# Patient Record
Sex: MALE | Race: WHITE | URBAN - METROPOLITAN AREA
[De-identification: names, ages, dates, MRNs, and addresses within clinical notes are randomized per-mention and may not be internally consistent; named-entity substitution may affect disease eponyms.]

---

## 2017-01-06 RX ORDER — CHLORTHALIDONE 25 MG/1
25 TABLET ORAL DAILY
Qty: 90 TABLET | Refills: 3 | Status: SHIPPED | OUTPATIENT
Start: 2017-01-06 | End: 2018-04-13 | Stop reason: SDUPTHER

## 2017-02-01 ENCOUNTER — OFFICE VISIT (OUTPATIENT)
Dept: ORTHOPEDIC SURGERY | Age: 62
End: 2017-02-01

## 2017-02-01 VITALS
BODY MASS INDEX: 37.1 KG/M2 | HEART RATE: 82 BPM | SYSTOLIC BLOOD PRESSURE: 124 MMHG | HEIGHT: 71 IN | DIASTOLIC BLOOD PRESSURE: 69 MMHG | WEIGHT: 265 LBS | RESPIRATION RATE: 16 BRPM

## 2017-02-01 DIAGNOSIS — M76.72 PERONEAL TENDINITIS OF LEFT LOWER EXTREMITY: ICD-10-CM

## 2017-02-01 DIAGNOSIS — G57.82 SURAL NEURITIS, LEFT: Primary | ICD-10-CM

## 2017-02-01 PROCEDURE — 99243 OFF/OP CNSLTJ NEW/EST LOW 30: CPT | Performed by: ORTHOPAEDIC SURGERY

## 2017-02-01 PROCEDURE — 73630 X-RAY EXAM OF FOOT: CPT | Performed by: ORTHOPAEDIC SURGERY

## 2017-02-01 PROCEDURE — 73610 X-RAY EXAM OF ANKLE: CPT | Performed by: ORTHOPAEDIC SURGERY

## 2017-02-01 PROCEDURE — 64450 NJX AA&/STRD OTHER PN/BRANCH: CPT | Performed by: ORTHOPAEDIC SURGERY

## 2017-02-01 RX ORDER — CLONAZEPAM 0.5 MG/1
0.5 TABLET ORAL 2 TIMES DAILY PRN
COMMUNITY

## 2017-02-01 RX ORDER — PRAVASTATIN SODIUM 40 MG
40 TABLET ORAL DAILY
COMMUNITY

## 2017-02-01 RX ORDER — SUMATRIPTAN 100 MG/1
100 TABLET, FILM COATED ORAL
COMMUNITY

## 2017-02-01 RX ORDER — SPIRONOLACTONE 25 MG/1
25 TABLET ORAL DAILY
COMMUNITY

## 2017-02-01 RX ORDER — GABAPENTIN 600 MG/1
600 TABLET ORAL 3 TIMES DAILY
COMMUNITY

## 2017-02-01 RX ORDER — CELECOXIB 200 MG/1
200 CAPSULE ORAL 2 TIMES DAILY
COMMUNITY

## 2017-02-01 RX ORDER — LISINOPRIL 20 MG/1
20 TABLET ORAL DAILY
COMMUNITY

## 2017-02-01 RX ORDER — DESONIDE 0.5 MG/G
CREAM TOPICAL 2 TIMES DAILY
COMMUNITY

## 2017-02-01 RX ORDER — CYCLOBENZAPRINE HCL 10 MG
10 TABLET ORAL 3 TIMES DAILY PRN
COMMUNITY

## 2017-02-01 RX ORDER — NYSTATIN 100000 U/G
CREAM TOPICAL 2 TIMES DAILY
COMMUNITY

## 2017-02-01 RX ORDER — BUPROPION HYDROCHLORIDE 150 MG/1
150 TABLET, EXTENDED RELEASE ORAL 2 TIMES DAILY
COMMUNITY

## 2017-02-01 RX ORDER — TRAMADOL HYDROCHLORIDE 50 MG/1
50 TABLET ORAL EVERY 6 HOURS PRN
COMMUNITY

## 2017-02-01 RX ORDER — TIZANIDINE 2 MG/1
2 TABLET ORAL EVERY 6 HOURS PRN
COMMUNITY

## 2017-02-01 RX ORDER — BUTALBITAL, ACETAMINOPHEN AND CAFFEINE 50; 325; 40 MG/1; MG/1; MG/1
1 TABLET ORAL EVERY 4 HOURS PRN
COMMUNITY

## 2017-02-01 RX ORDER — CHLORTHALIDONE 50 MG/1
50 TABLET ORAL DAILY
COMMUNITY

## 2017-02-01 RX ORDER — OMEPRAZOLE 20 MG/1
20 CAPSULE, DELAYED RELEASE ORAL DAILY
COMMUNITY

## 2017-02-07 PROBLEM — M76.72 PERONEAL TENDINITIS OF LEFT LOWER EXTREMITY: Status: ACTIVE | Noted: 2017-02-07

## 2017-02-07 PROBLEM — G57.80 SURAL NEURITIS: Status: ACTIVE | Noted: 2017-02-07

## 2017-02-07 RX ORDER — LIDOCAINE HYDROCHLORIDE 10 MG/ML
3 INJECTION, SOLUTION INFILTRATION; PERINEURAL ONCE
Qty: 3 ML | Refills: 0
Start: 2017-02-07 | End: 2017-02-07

## 2017-02-20 ENCOUNTER — OFFICE VISIT (OUTPATIENT)
Dept: CARDIOLOGY | Facility: CLINIC | Age: 62
End: 2017-02-20

## 2017-02-20 VITALS
SYSTOLIC BLOOD PRESSURE: 124 MMHG | WEIGHT: 265 LBS | BODY MASS INDEX: 37.1 KG/M2 | HEIGHT: 71 IN | HEART RATE: 78 BPM | DIASTOLIC BLOOD PRESSURE: 76 MMHG

## 2017-02-20 DIAGNOSIS — R06.09 DYSPNEA ON EXERTION: Primary | ICD-10-CM

## 2017-02-20 DIAGNOSIS — E78.2 MIXED HYPERLIPIDEMIA: ICD-10-CM

## 2017-02-20 DIAGNOSIS — I10 ESSENTIAL HYPERTENSION: ICD-10-CM

## 2017-02-20 PROCEDURE — 99213 OFFICE O/P EST LOW 20 MIN: CPT | Performed by: INTERNAL MEDICINE

## 2017-02-20 RX ORDER — PRAVASTATIN SODIUM 40 MG
40 TABLET ORAL DAILY
COMMUNITY
End: 2017-11-27 | Stop reason: SDUPTHER

## 2017-02-20 RX ORDER — TRAMADOL HYDROCHLORIDE 50 MG/1
50 TABLET ORAL EVERY 6 HOURS PRN
COMMUNITY
End: 2017-11-27

## 2017-02-20 RX ORDER — CELECOXIB 200 MG/1
200 CAPSULE ORAL DAILY
COMMUNITY
End: 2017-11-27

## 2017-02-20 RX ORDER — CYCLOBENZAPRINE HCL 10 MG
10 TABLET ORAL 3 TIMES DAILY PRN
COMMUNITY
End: 2018-05-11

## 2017-02-20 NOTE — PROGRESS NOTES
[facility] at [facility]  Office Progress Note  Gonzalo Oates  1955  [phone]      Visit Date: 02/20/2017    PCP: Triston Weinstein MD  [address] [id number]    IDENTIFICATION: A 61 y.o. male retired  from [employer].      Chief Complaint   Patient presents with   • Follow-up     EVANS, VENOUS INSUFFICIENCY     PROBLEM LIST:  1. Dyspnea on exertion:   a. D-dimer, slightly elevated at 0.85, 03/26/2015:   b. CT scan shows no evidence of PE.   c. May 2015, echocardiogram showed normal EF, left atrial dilatation, mild MR and TR, RVSP 20% to 25%.  d. May 2015, MPS WNL, EF 52%.  2. Venous insufficiency:  a. Venous stenting per Dr. Toribio, [facility], 2015.  3. Hypertension.   4. Narcolepsy.   5. Obstructive sleep apnea:   a. Noncompliant with CPAP.   6. Restless leg:  a. Suppressed with narcotics.  7. Irritable bowel.   8. Prediabetes.   9. Erectile dysfunction.   10. Chronic sinusitis.  11. GERD.   12. Left ankle fraction, February 2015.  13. Surgical history:   a. Hernia repair.   b. Sinus surgery.   c. Laminectomy.   d. Tonsillectomy.   e. Appendectomy.          Allergies  Allergies   Allergen Reactions   • Calcium Channel Blockers      Lower extremity edema    • Hyoscyamine      DOES NOT HELP   • Latex    • Ropinirole      DID NOT HELP   • Zinc Oxide        Current Medications    Current Outpatient Prescriptions:   •  butalbital-acetaminophen-caffeine (FIORICET) -40 MG capsule capsule, Take 1 capsule by mouth as needed., Disp: , Rfl:   •  Calcium Carbonate-Vit D-Min (CALCIUM 1200 PO), Take  by mouth 2 (Two) Times a Day., Disp: , Rfl:   •  celecoxib (CeleBREX) 200 MG capsule, Take 200 mg by mouth Daily., Disp: , Rfl:   •  chlorthalidone (HYGROTON) 25 MG tablet, Take 1 tablet by mouth Daily., Disp: 90 tablet, Rfl: 3  •  Cholecalciferol (VITAMIN D3) 2000 UNITS capsule, Take 2 tablets by mouth daily., Disp: , Rfl:   •  clonazePAM (KlonoPIN) 0.5  MG tablet, Take 0.5 mg by mouth 2 (two) times a day as needed for seizures., Disp: , Rfl:   •  cyclobenzaprine (FLEXERIL) 10 MG tablet, Take 10 mg by mouth 3 (Three) Times a Day As Needed for muscle spasms., Disp: , Rfl:   •  desonide (DESOWEN) 0.05 % cream, Apply  topically. Apply sparingly to affected areas 2 to 3 times daily, Disp: , Rfl:   •  gabapentin (NEURONTIN) 800 MG tablet, Take 1 tablet by mouth 4 (four) times a day., Disp: , Rfl:   •  lisinopril (PRINIVIL,ZESTRIL) 20 MG tablet, Take 1 tablet by mouth daily., Disp: , Rfl:   •  nystatin-triamcinolone (MYCOLOG II) cream, Apply  topically 3 (three) times a day., Disp: , Rfl:   •  Omega-3 Fatty Acids (FISH OIL) 1000 MG capsule capsule, Take  by mouth 2 (Two) Times a Day With Meals., Disp: , Rfl:   •  omeprazole (PriLOSEC) 20 MG capsule, Take  by mouth Daily., Disp: , Rfl:   •  pravastatin (PRAVACHOL) 40 MG tablet, Take 40 mg by mouth Daily., Disp: , Rfl:   •  rotigotine (NEUPRO) 1 MG/24HR patch 24 hour 24 hour patch, Place 1 patch on the skin Daily., Disp: , Rfl:   •  spironolactone (ALDACTONE) 25 MG tablet, Take 1 tablet by mouth daily., Disp: , Rfl:   •  SUMAtriptan (IMITREX) 50 MG tablet, Take one tablet at onset of headache. May repeat dose one time in 2 hours if headache not relieved., Disp: , Rfl:   •  tiZANidine (ZANAFLEX) 2 MG tablet, Take 2 mg by mouth 3 (three) times a day., Disp: , Rfl:   •  traMADol (ULTRAM) 50 MG tablet, Take 50 mg by mouth Every 6 (Six) Hours As Needed for moderate pain (4-6)., Disp: , Rfl:       History of Present Illness   Patient presents in routine follow-up.  Has chronic dyspnea and still present albeit slightly improved.  It was worsened for a time after his lumbar fusion surgery but after working with physical therapy at this last few weeks he has had improvement.  Overall he feels pretty well without any chest pain, orthopnea, PND, palpitations, or significant lower extremity edema.  Blood pressures been well controlled  "and he's been taking pravastatin therapy daily without any severe side effects.    ROS:  All systems have been reviewed and are negative with the exception of those mentioned in the HPI.    OBJECTIVE:  Vitals:    02/20/17 1510 02/20/17 1522   BP: 118/80 124/76   BP Location: Right arm Left arm   Patient Position: Sitting Sitting   Pulse: 78    Weight: 265 lb (120 kg)    Height: 71\" (180.3 cm)      Physical Exam   Constitutional: He is oriented to person, place, and time. He appears well-developed and well-nourished. No distress.   Neck: Normal range of motion. Neck supple. No hepatojugular reflux and no JVD present. Carotid bruit is not present. No tracheal deviation present. No thyromegaly present.   Cardiovascular: Normal rate, regular rhythm, S1 normal, S2 normal, intact distal pulses and normal pulses.  PMI is not displaced.  Exam reveals no gallop, no distant heart sounds, no friction rub, no midsystolic click and no opening snap.    No murmur heard.  Pulses:       Radial pulses are 2+ on the right side, and 2+ on the left side.        Dorsalis pedis pulses are 2+ on the right side, and 2+ on the left side.        Posterior tibial pulses are 2+ on the right side, and 2+ on the left side.   Pulmonary/Chest: Effort normal and breath sounds normal. He has no wheezes. He has no rales.   Abdominal: Soft. Bowel sounds are normal. He exhibits no mass. There is no tenderness. There is no guarding.   Musculoskeletal: He exhibits no edema or tenderness.   Neurological: He is alert and oriented to person, place, and time.   Nursing note and vitals reviewed.      Diagnostic Data:  Procedures      ASSESSMENT:   Diagnosis Plan   1. Dyspnea on exertion     2. Mixed hyperlipidemia     3. Essential hypertension         PLAN:  1.  Chronic and nonprogressive with low risk ischemic evaluation in May 2015.  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  2.  Continue " statin therapy.  Recommend PCP continued routine yearly lipid check.  Goal LDL of less than 100.  3.  Well controlled on current therapy no changes to current regimen.    Follow-up with us in 9-12 months or sooner if needed.    Scribed for Ross Ellsworth MD by BARBARA Mujica. 2/20/2017  3:48 PM   I, Ross Ellsworth MD, personally performed the services described in this documentation as scribed by the above named individual in my presence, and it is both accurate and complete.  2/22/2017  9:06 AM    Triston Weinstein MD, thank you for referring Mr. Oates for evaluation.  I have forwarded my electronically generated recommendations to you for review.  Please do not hesitate to call with any questions.      Ross Ellsworth MD, FACC

## 2017-02-21 ENCOUNTER — TELEPHONE (OUTPATIENT)
Dept: CARDIOLOGY | Facility: CLINIC | Age: 62
End: 2017-02-21

## 2017-02-21 NOTE — TELEPHONE ENCOUNTER
Patient called and stated he had lab work to fax. Informed patient of fax number. He will fax labs today

## 2017-02-22 ENCOUNTER — HOSPITAL ENCOUNTER (OUTPATIENT)
Dept: MRI IMAGING | Age: 62
Discharge: HOME | End: 2017-02-22
Attending: ORTHOPAEDIC SURGERY | Admitting: ORTHOPAEDIC SURGERY

## 2017-02-22 ENCOUNTER — OFFICE VISIT (OUTPATIENT)
Dept: ORTHOPEDIC SURGERY | Age: 62
End: 2017-02-22

## 2017-02-22 VITALS
BODY MASS INDEX: 37.1 KG/M2 | HEART RATE: 67 BPM | DIASTOLIC BLOOD PRESSURE: 57 MMHG | HEIGHT: 71 IN | SYSTOLIC BLOOD PRESSURE: 121 MMHG | WEIGHT: 265 LBS | RESPIRATION RATE: 16 BRPM

## 2017-02-22 DIAGNOSIS — M76.72 PERONEAL TENDINITIS OF LEFT LOWER EXTREMITY: ICD-10-CM

## 2017-02-22 DIAGNOSIS — G57.82 SURAL NEURITIS, LEFT: Primary | ICD-10-CM

## 2017-02-22 PROCEDURE — 99214 OFFICE O/P EST MOD 30 MIN: CPT | Performed by: ORTHOPAEDIC SURGERY

## 2017-02-22 RX ORDER — CEPHALEXIN 500 MG/1
500 CAPSULE ORAL 4 TIMES DAILY
Qty: 20 CAPSULE | Refills: 0 | Status: SHIPPED | OUTPATIENT
Start: 2017-02-22

## 2017-11-27 ENCOUNTER — OFFICE VISIT (OUTPATIENT)
Dept: CARDIOLOGY | Facility: CLINIC | Age: 62
End: 2017-11-27

## 2017-11-27 VITALS
HEIGHT: 71 IN | DIASTOLIC BLOOD PRESSURE: 58 MMHG | HEART RATE: 64 BPM | BODY MASS INDEX: 39.23 KG/M2 | WEIGHT: 280.2 LBS | SYSTOLIC BLOOD PRESSURE: 124 MMHG

## 2017-11-27 DIAGNOSIS — I10 ESSENTIAL HYPERTENSION: Primary | ICD-10-CM

## 2017-11-27 DIAGNOSIS — E78.2 MIXED HYPERLIPIDEMIA: ICD-10-CM

## 2017-11-27 PROCEDURE — 99213 OFFICE O/P EST LOW 20 MIN: CPT | Performed by: INTERNAL MEDICINE

## 2017-11-27 RX ORDER — HYDROCODONE BITARTRATE AND ACETAMINOPHEN 5; 325 MG/1; MG/1
1 TABLET ORAL EVERY 6 HOURS PRN
COMMUNITY
End: 2018-05-11

## 2017-11-27 RX ORDER — ASPIRIN 81 MG/1
81 TABLET ORAL DAILY
COMMUNITY
End: 2018-05-11

## 2017-11-27 RX ORDER — PRAVASTATIN SODIUM 40 MG
40 TABLET ORAL DAILY
Qty: 90 TABLET | Refills: 3 | Status: SHIPPED | OUTPATIENT
Start: 2017-11-27 | End: 2018-04-13 | Stop reason: SDUPTHER

## 2017-11-27 RX ORDER — DULOXETIN HYDROCHLORIDE 60 MG/1
60 CAPSULE, DELAYED RELEASE ORAL DAILY
COMMUNITY
End: 2018-05-11

## 2017-11-27 RX ORDER — NAPROXEN SODIUM 220 MG
220 TABLET ORAL 2 TIMES DAILY PRN
COMMUNITY
End: 2018-05-11

## 2017-11-27 RX ORDER — DUTASTERIDE 0.5 MG/1
0.5 CAPSULE, LIQUID FILLED ORAL DAILY
COMMUNITY
End: 2018-05-11

## 2017-11-27 NOTE — PROGRESS NOTES
[facility] at [facility]  Office Progress Note  Gonzalo Oates  1955  [phone]      Visit Date: 02/20/2017    PCP: Triston Weinstein MD  [address] [id number]    IDENTIFICATION: A 62 y.o. male retired  from [employer].      Chief Complaint   Patient presents with   • Follow-up   • Shortness of Breath   • Hypertension   • Hyperlipidemia   • Surgical Clearance     PROBLEM LIST:  1. Dyspnea on exertion:   a. D-dimer, slightly elevated at 0.85, 03/26/2015:   b. CT scan shows no evidence of PE.   c. May 2015, echocardiogram showed normal EF, left atrial dilatation, mild MR and TR, RVSP 20% to 25%.  d. May 2015, MPS WNL, EF 52%.  2. Venous insufficiency:  a. Venous stenting per Dr. Toribio, [facility], 2015.  3. Hypertension.   4. Narcolepsy.   5. Obstructive sleep apnea:   a. Noncompliant with CPAP.   6. Restless leg:  a. Suppressed with narcotics.  7. Irritable bowel.   8. Prediabetes.   9. Erectile dysfunction.   10. Chronic sinusitis.  11. GERD.   12. Left ankle fraction, February 2015.  13. Surgical history:   a. Hernia repair.   b. Sinus surgery.   c. Laminectomy.   d. Tonsillectomy.   e. Appendectomy.          Allergies  Allergies   Allergen Reactions   • Calcium Channel Blockers      Lower extremity edema    • Hyoscyamine      DOES NOT HELP   • Latex    • Ropinirole      DID NOT HELP   • Zinc Oxide        Current Medications    Current Outpatient Prescriptions:   •  aspirin 81 MG EC tablet, Take 81 mg by mouth Daily., Disp: , Rfl:   •  butalbital-acetaminophen-caffeine (FIORICET) -40 MG capsule capsule, Take 1 capsule by mouth as needed., Disp: , Rfl:   •  chlorthalidone (HYGROTON) 25 MG tablet, Take 1 tablet by mouth Daily., Disp: 90 tablet, Rfl: 3  •  Cholecalciferol (VITAMIN D3) 2000 UNITS capsule, Take 2 tablets by mouth daily., Disp: , Rfl:   •  clonazePAM (KlonoPIN) 0.5 MG tablet, Take 0.5 mg by mouth 2 (two) times a day as needed  "for seizures., Disp: , Rfl:   •  cyclobenzaprine (FLEXERIL) 10 MG tablet, Take 10 mg by mouth 3 (Three) Times a Day As Needed for muscle spasms., Disp: , Rfl:   •  DULoxetine (CYMBALTA) 60 MG capsule, Take 60 mg by mouth Daily., Disp: , Rfl:   •  dutasteride (AVODART) 0.5 MG capsule, Take 0.5 mg by mouth Daily., Disp: , Rfl:   •  esomeprazole (nexIUM) 20 MG capsule, Take 20 mg by mouth Every Morning Before Breakfast., Disp: , Rfl:   •  gabapentin (NEURONTIN) 800 MG tablet, Take 1 tablet by mouth 4 (four) times a day., Disp: , Rfl:   •  HYDROcodone-acetaminophen (NORCO) 5-325 MG per tablet, Take 1 tablet by mouth Every 6 (Six) Hours As Needed., Disp: , Rfl:   •  lisinopril (PRINIVIL,ZESTRIL) 20 MG tablet, Take 1 tablet by mouth daily., Disp: , Rfl:   •  naproxen sodium (ALEVE) 220 MG tablet, Take 220 mg by mouth 2 (Two) Times a Day As Needed., Disp: , Rfl:   •  nystatin-triamcinolone (MYCOLOG II) cream, Apply  topically 3 (three) times a day., Disp: , Rfl:   •  Omega-3 Fatty Acids (FISH OIL) 1000 MG capsule capsule, Take  by mouth 2 (Two) Times a Day With Meals., Disp: , Rfl:   •  pravastatin (PRAVACHOL) 40 MG tablet, Take 1 tablet by mouth Daily., Disp: 90 tablet, Rfl: 3  •  spironolactone (ALDACTONE) 25 MG tablet, Take 1 tablet by mouth daily., Disp: , Rfl:   •  SUMAtriptan (IMITREX) 50 MG tablet, Take one tablet at onset of headache. May repeat dose one time in 2 hours if headache not relieved., Disp: , Rfl:       History of Present Illness   Patient presents in routine follow-up.   ROS:  All systems have been reviewed and are negative with the exception of those mentioned in the HPI.    OBJECTIVE:  Vitals:    11/27/17 1205   BP: 124/58   BP Location: Right arm   Patient Position: Sitting   Pulse: 64   Weight: 280 lb 3.2 oz (127 kg)   Height: 71\" (180.3 cm)     Physical Exam   Constitutional: He is oriented to person, place, and time. He appears well-developed and well-nourished. No distress.   Neck: Normal range " of motion. Neck supple. No hepatojugular reflux and no JVD present. Carotid bruit is not present. No tracheal deviation present. No thyromegaly present.   Cardiovascular: Normal rate, regular rhythm, S1 normal, S2 normal, intact distal pulses and normal pulses.  PMI is not displaced.  Exam reveals no gallop, no distant heart sounds, no friction rub, no midsystolic click and no opening snap.    No murmur heard.  Pulses:       Radial pulses are 2+ on the right side, and 2+ on the left side.        Dorsalis pedis pulses are 2+ on the right side, and 2+ on the left side.        Posterior tibial pulses are 2+ on the right side, and 2+ on the left side.   Pulmonary/Chest: Effort normal and breath sounds normal. He has no wheezes. He has no rales.   Abdominal: Soft. Bowel sounds are normal. He exhibits no mass. There is no tenderness. There is no guarding.   Musculoskeletal: He exhibits no edema or tenderness.   Neurological: He is alert and oriented to person, place, and time.   Nursing note and vitals reviewed.      Diagnostic Data:  Procedures      ASSESSMENT:   Diagnosis Plan   1. Essential hypertension     2. Mixed hyperlipidemia         PLAN:  1.  Chronic and nonprogressive with low risk ischemic evaluation in May 2015.  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  2.  Continue statin therapy.  Recommend PCP continued routine yearly lipid check.  Goal LDL of less than 100.  3.  Well controlled on current therapy no changes to current regimen.    Follow-up with us in 9-12 months or sooner if needed.    Scribed for Ross Ellsworth MD by Ross Ellsworth MD. 11/27/2017  12:55 PM   I, Ross Ellsworth MD, personally performed the services described in this documentation as scribed by the above named individual in my presence, and it is both accurate and complete.  11/27/2017  12:55 PM    Triston Weinstein MD, thank you for referring Mr. Oates for  evaluation.  I have forwarded my electronically generated recommendations to you for review.  Please do not hesitate to call with any questions.      Ross Ellsworth MD, FACC

## 2017-11-27 NOTE — PROGRESS NOTES
[facility] at [facility]  Office Progress Note  Gonzalo Oates  1955  [phone]      Visit Date: 11/27/2017    PCP: Triston Weinstein MD  [address] [id number]    IDENTIFICATION: A 62 y.o. male retired  from [employer].     Chief Complaint   Patient presents with   • Follow-up   • Shortness of Breath   • Hypertension   • Hyperlipidemia   • Surgical Clearance       PROBLEM LIST:   1. Dyspnea on exertion:   a. D-dimer, slightly elevated at 0.85, 03/26/2015:   b. CT scan shows no evidence of PE.   c. May 2015, echocardiogram showed normal EF, left atrial dilatation, mild MR and TR, RVSP 20% to 25%.  d. May 2015, MPS WNL, EF 52%.  2. Venous insufficiency:  a. Venous stenting per Dr. Toribio, [facility], 2015.  3. Hypertension.   4. Narcolepsy.   5. Obstructive sleep apnea:   a. Noncompliant with CPAP.   6. Restless leg:  a. Suppressed with narcotics.  7. Irritable bowel.   8. Prediabetes.   9. Erectile dysfunction.   10. Chronic sinusitis.  11. GERD.   12. Left ankle fraction, February 2015.  13. Surgical history:   a. Hernia repair.   b. Sinus surgery.   c. Laminectomy.   d. Tonsillectomy.   e. Appendectomy.     Allergies  Allergies   Allergen Reactions   • Calcium Channel Blockers      Lower extremity edema    • Hyoscyamine      DOES NOT HELP   • Latex    • Ropinirole      DID NOT HELP   • Zinc Oxide        Current Medications    Current Outpatient Prescriptions:   •  aspirin 81 MG EC tablet, Take 81 mg by mouth Daily., Disp: , Rfl:   •  butalbital-acetaminophen-caffeine (FIORICET) -40 MG capsule capsule, Take 1 capsule by mouth as needed., Disp: , Rfl:   •  chlorthalidone (HYGROTON) 25 MG tablet, Take 1 tablet by mouth Daily., Disp: 90 tablet, Rfl: 3  •  Cholecalciferol (VITAMIN D3) 2000 UNITS capsule, Take 2 tablets by mouth daily., Disp: , Rfl:   •  clonazePAM (KlonoPIN) 0.5 MG tablet, Take 0.5 mg by mouth 2 (two) times a day as needed for  seizures., Disp: , Rfl:   •  cyclobenzaprine (FLEXERIL) 10 MG tablet, Take 10 mg by mouth 3 (Three) Times a Day As Needed for muscle spasms., Disp: , Rfl:   •  DULoxetine (CYMBALTA) 60 MG capsule, Take 60 mg by mouth Daily., Disp: , Rfl:   •  dutasteride (AVODART) 0.5 MG capsule, Take 0.5 mg by mouth Daily., Disp: , Rfl:   •  esomeprazole (nexIUM) 20 MG capsule, Take 20 mg by mouth Every Morning Before Breakfast., Disp: , Rfl:   •  gabapentin (NEURONTIN) 800 MG tablet, Take 1 tablet by mouth 4 (four) times a day., Disp: , Rfl:   •  HYDROcodone-acetaminophen (NORCO) 5-325 MG per tablet, Take 1 tablet by mouth Every 6 (Six) Hours As Needed., Disp: , Rfl:   •  lisinopril (PRINIVIL,ZESTRIL) 20 MG tablet, Take 1 tablet by mouth daily., Disp: , Rfl:   •  naproxen sodium (ALEVE) 220 MG tablet, Take 220 mg by mouth 2 (Two) Times a Day As Needed., Disp: , Rfl:   •  nystatin-triamcinolone (MYCOLOG II) cream, Apply  topically 3 (three) times a day., Disp: , Rfl:   •  Omega-3 Fatty Acids (FISH OIL) 1000 MG capsule capsule, Take  by mouth 2 (Two) Times a Day With Meals., Disp: , Rfl:   •  pravastatin (PRAVACHOL) 40 MG tablet, Take 1 tablet by mouth Daily., Disp: 90 tablet, Rfl: 3  •  spironolactone (ALDACTONE) 25 MG tablet, Take 1 tablet by mouth daily., Disp: , Rfl:   •  SUMAtriptan (IMITREX) 50 MG tablet, Take one tablet at onset of headache. May repeat dose one time in 2 hours if headache not relieved., Disp: , Rfl:       History of Present Illness     Pt denies any new chest pain, dyspnea, dyspnea on exertion, orthopnea, PND, palpitations, lower extremity edema, or claudication.  Very noncompliant from dietary standpoint in that he is largely confined to a seated position majority of the day.  He subsequently lost his employment and is considering moving to [place] closer to his daughter    ROS:  All systems have been reviewed and are negative with the exception of those mentioned in the HPI.    OBJECTIVE:  Vitals:     "11/27/17 1205   BP: 124/58   BP Location: Right arm   Patient Position: Sitting   Pulse: 64   Weight: 280 lb 3.2 oz (127 kg)   Height: 71\" (180.3 cm)     Physical Exam   Constitutional: He appears well-developed and well-nourished.   Neck: Normal range of motion. Neck supple. No hepatojugular reflux and no JVD present. Carotid bruit is not present. No tracheal deviation present. No thyromegaly present.   Cardiovascular: Normal rate, regular rhythm, S1 normal, S2 normal, intact distal pulses and normal pulses.  PMI is not displaced.  Exam reveals no gallop, no distant heart sounds, no friction rub, no midsystolic click and no opening snap.    Murmur heard.  Pulses:       Radial pulses are 2+ on the right side, and 2+ on the left side.        Dorsalis pedis pulses are 2+ on the right side, and 2+ on the left side.        Posterior tibial pulses are 2+ on the right side, and 2+ on the left side.   Pulmonary/Chest: Effort normal and breath sounds normal. He has no wheezes. He has no rales.   Abdominal: Soft. Bowel sounds are normal. He exhibits distension. He exhibits no mass. There is no tenderness. There is no guarding.   Musculoskeletal: He exhibits edema.       Diagnostic Data:  Procedures      ASSESSMENT:   Diagnosis Plan   1. Essential hypertension     2. Mixed hyperlipidemia         PLAN:  1. Hypertension controlled on current  2. Lower extremity swelling historical lower extremity DVT with chronic venous insufficiency compression stockings in place      Triston Weinstein MD, thank you for referring Mr. Oates for evaluation.  I have forwarded my electronically generated recommendations to you for review.  Please do not hesitate to call with any questions.    Scribed for Ross Ellsworth MD by Anna Murray PA-C. 11/27/2017  12:14 PM  I, Ross Ellsworth MD, personally performed the services described in this documentation as scribed by the above named individual in my presence, and it is both accurate and " complete.  11/27/2017  12:55 PM    Ross Ellsworth MD, FACC

## 2018-04-06 RX ORDER — LISINOPRIL 20 MG/1
20 TABLET ORAL DAILY
Qty: 90 TABLET | Refills: 3 | Status: SHIPPED | OUTPATIENT
Start: 2018-04-06 | End: 2018-04-13 | Stop reason: SDUPTHER

## 2018-04-06 RX ORDER — CHLORTHALIDONE 25 MG/1
25 TABLET ORAL DAILY
Qty: 90 TABLET | Refills: 3 | Status: SHIPPED | OUTPATIENT
Start: 2018-04-06 | End: 2018-04-13 | Stop reason: SDUPTHER

## 2018-04-06 RX ORDER — PRAVASTATIN SODIUM 40 MG
40 TABLET ORAL DAILY
Qty: 90 TABLET | Refills: 3 | Status: SHIPPED | OUTPATIENT
Start: 2018-04-06 | End: 2018-04-13 | Stop reason: SDUPTHER

## 2018-04-13 RX ORDER — PRAVASTATIN SODIUM 40 MG
40 TABLET ORAL DAILY
Qty: 90 TABLET | Refills: 3 | Status: SHIPPED | OUTPATIENT
Start: 2018-04-13 | End: 2018-04-18 | Stop reason: SDUPTHER

## 2018-04-13 RX ORDER — CHLORTHALIDONE 25 MG/1
25 TABLET ORAL DAILY
Qty: 90 TABLET | Refills: 3 | Status: SHIPPED | OUTPATIENT
Start: 2018-04-13 | End: 2018-04-18 | Stop reason: SDUPTHER

## 2018-04-13 RX ORDER — LISINOPRIL 20 MG/1
20 TABLET ORAL DAILY
Qty: 90 TABLET | Refills: 3 | Status: SHIPPED | OUTPATIENT
Start: 2018-04-13 | End: 2018-04-18 | Stop reason: SDUPTHER

## 2018-04-18 RX ORDER — PRAVASTATIN SODIUM 40 MG
TABLET ORAL
Qty: 90 TABLET | Refills: 3 | Status: SHIPPED | OUTPATIENT
Start: 2018-04-18 | End: 2018-06-08 | Stop reason: SDUPTHER

## 2018-04-18 RX ORDER — LISINOPRIL 20 MG/1
20 TABLET ORAL DAILY
Qty: 90 TABLET | Refills: 3 | Status: SHIPPED | OUTPATIENT
Start: 2018-04-18 | End: 2018-05-11

## 2018-04-18 RX ORDER — CHLORTHALIDONE 25 MG/1
25 TABLET ORAL DAILY
Qty: 90 TABLET | Refills: 3 | Status: SHIPPED | OUTPATIENT
Start: 2018-04-18 | End: 2018-06-08 | Stop reason: SDUPTHER

## 2018-05-10 ENCOUNTER — OFFICE VISIT (OUTPATIENT)
Dept: INTERNAL MEDICINE | Facility: CLINIC | Age: 63
End: 2018-05-10

## 2018-05-10 VITALS
TEMPERATURE: 97.9 F | HEIGHT: 71 IN | RESPIRATION RATE: 16 BRPM | WEIGHT: 276.13 LBS | OXYGEN SATURATION: 96 % | DIASTOLIC BLOOD PRESSURE: 70 MMHG | SYSTOLIC BLOOD PRESSURE: 118 MMHG | HEART RATE: 86 BPM | BODY MASS INDEX: 38.66 KG/M2

## 2018-05-10 DIAGNOSIS — R06.09 DYSPNEA ON EXERTION: ICD-10-CM

## 2018-05-10 DIAGNOSIS — I87.2 VENOUS INSUFFICIENCY: ICD-10-CM

## 2018-05-10 DIAGNOSIS — I10 ESSENTIAL HYPERTENSION: ICD-10-CM

## 2018-05-10 DIAGNOSIS — G25.81 RESTLESS LEG: Primary | ICD-10-CM

## 2018-05-10 PROCEDURE — 99214 OFFICE O/P EST MOD 30 MIN: CPT | Performed by: NURSE PRACTITIONER

## 2018-05-11 RX ORDER — DOXEPIN HYDROCHLORIDE 10 MG/1
CAPSULE ORAL
Refills: 4 | COMMUNITY
Start: 2018-03-12 | End: 2018-05-23

## 2018-05-11 RX ORDER — PRAVASTATIN SODIUM 40 MG
40 TABLET ORAL DAILY
COMMUNITY
End: 2019-04-18 | Stop reason: SDUPTHER

## 2018-05-11 RX ORDER — VALSARTAN AND HYDROCHLOROTHIAZIDE 80; 12.5 MG/1; MG/1
TABLET, FILM COATED ORAL DAILY
COMMUNITY
Start: 2018-05-05 | End: 2018-05-23

## 2018-05-11 RX ORDER — CHLORTHALIDONE 25 MG/1
25 TABLET ORAL
COMMUNITY
End: 2022-09-30

## 2018-05-11 RX ORDER — OXYCODONE AND ACETAMINOPHEN 10; 325 MG/1; MG/1
1 TABLET ORAL
COMMUNITY
Start: 2018-04-20 | End: 2018-06-08

## 2018-05-11 RX ORDER — OMEPRAZOLE 40 MG/1
40 CAPSULE, DELAYED RELEASE ORAL DAILY
COMMUNITY
End: 2018-07-16 | Stop reason: SDUPTHER

## 2018-05-11 RX ORDER — OCTISALATE, AVOBENZONE, HOMOSALATE, AND OCTOCRYLENE 29.4; 29.4; 49; 25.48 MG/ML; MG/ML; MG/ML; MG/ML
LOTION TOPICAL DAILY
COMMUNITY
End: 2018-06-08

## 2018-05-11 RX ORDER — BLOOD-GLUCOSE METER
KIT MISCELLANEOUS
Qty: 1 EACH | Refills: 0 | Status: SHIPPED | OUTPATIENT
Start: 2018-05-11 | End: 2019-08-27

## 2018-05-11 RX ORDER — CELECOXIB 200 MG/1
CAPSULE ORAL
Refills: 4 | COMMUNITY
Start: 2018-03-12 | End: 2018-05-23

## 2018-05-11 RX ORDER — CYCLOBENZAPRINE HCL 10 MG
10 TABLET ORAL
COMMUNITY
Start: 2018-04-20 | End: 2018-06-08 | Stop reason: SDUPTHER

## 2018-05-11 RX ORDER — LANCETS 30 GAUGE
EACH MISCELLANEOUS
Qty: 100 EACH | Refills: 1 | Status: SHIPPED | OUTPATIENT
Start: 2018-05-11 | End: 2018-10-24

## 2018-05-21 PROBLEM — I10 HYPERTENSION: Status: ACTIVE | Noted: 2018-05-21

## 2018-05-21 NOTE — PROGRESS NOTES
Chief Complaint / Reason:      Chief Complaint   Patient presents with   • Establish Care     bilateral leg edema, thirst and dizziness.        Subjective     HPI  Patient presents today to establish care and discuss bilateral leg edema, thirst and dizziness.  He denies chest pain, shortness of breath or heart palpitations vital signs are stable.  He is accompanied by his wife and rates that this is been going on for over a week now and he is getting concerned.  Past medical history includes hypertension varicose veins he has had a DVT venous insufficiency, obesity ankle fracture and obstructive sleep apnea.  He does have dyspnea on exertion.  Patient does see cardiology and states that he was on lisinopril and that several of his medications were changed and he would like to go back on them as he feels like his blood pressure medicine caused increased swelling.  He does have bilateral edema in both legs he was on multiple medications for swelling and hypertension which include chlorothiazide spironolactone chlorthalidone and the lisinopril was changed to valsartan.  Patient is also 2 weeks postop lumbar fusion as he has chronic back pain.  The site from the lumbar fusion appears to be healing well and he denies fever or chills.  Patient does drink alcohol and states that he has maybe 3 shots of liquor per week.  History taken from: patient    PMH/FH/Social History were reviewed and updated appropriately in the electronic medical record.     Review of Systems:   Review of Systems   Constitutional: Positive for fatigue. Negative for appetite change, chills, fever and unexpected weight change.   HENT: Negative for congestion, ear pain, hearing loss, nosebleeds, postnasal drip, rhinorrhea, sore throat, tinnitus and trouble swallowing.    Eyes: Negative for photophobia, discharge and visual disturbance.   Respiratory: Positive for shortness of breath. Negative for cough, chest tightness and wheezing.     Cardiovascular: Positive for leg swelling. Negative for chest pain and palpitations.   Gastrointestinal: Negative for abdominal distention, abdominal pain, blood in stool, constipation, diarrhea, nausea and vomiting.   Endocrine: Negative for cold intolerance, heat intolerance, polydipsia, polyphagia and polyuria.   Genitourinary: Negative for difficulty urinating, dysuria, flank pain, frequency, genital sores, hematuria and urgency.   Musculoskeletal: Positive for arthralgias, gait problem, joint swelling and myalgias. Negative for back pain, neck pain and neck stiffness.   Skin: Negative for color change, pallor, rash and wound.   Allergic/Immunologic: Negative for environmental allergies, food allergies and immunocompromised state.   Neurological: Positive for weakness and numbness. Negative for dizziness, tremors, seizures and headaches.   Hematological: Negative for adenopathy. Does not bruise/bleed easily.   Psychiatric/Behavioral: Positive for sleep disturbance. Negative for agitation, behavioral problems, confusion, hallucinations, self-injury and suicidal ideas. The patient is nervous/anxious.      All other systems were reviewed and are negative.  Exceptions are noted in the subjective or above.      Objective     Vital Signs  Vitals:    05/10/18 1728   BP: 118/70   Pulse: 86   Resp: 16   Temp: 97.9 °F (36.6 °C)   SpO2: 96%       Body mass index is 38.51 kg/m².    Physical Exam   Constitutional: He is oriented to person, place, and time. He appears well-developed and well-nourished.   Cardiovascular: Normal rate, regular rhythm, normal heart sounds and intact distal pulses.    Pulmonary/Chest: Effort normal and breath sounds normal. He exhibits no tenderness.   Abdominal: Soft. Bowel sounds are normal.   Musculoskeletal: He exhibits edema and tenderness.        Back:    Neurological: He is alert and oriented to person, place, and time.   Skin: Skin is warm and dry. Capillary refill takes less than 2  seconds.   Psychiatric: He has a normal mood and affect. His behavior is normal. Judgment and thought content normal.   Nursing note and vitals reviewed.       Results Review:    I reviewed the patient's previous clinical results.       Medication Review:     Current Outpatient Prescriptions:   •  Cholecalciferol (VITAMIN D3) 5000 units capsule capsule, Take 5,000 Units by mouth Daily., Disp: , Rfl:   •  pravastatin (PRAVACHOL) 40 MG tablet, Take 40 mg by mouth Daily., Disp: , Rfl:   •  Probiotic Product (ALIGN) 4 MG capsule, Take  by mouth Daily., Disp: , Rfl:   •  aspirin 81 MG tablet, Take 81 mg by mouth., Disp: , Rfl:   •  butalbital-acetaminophen-caffeine (FIORICET) -40 MG capsule capsule, Take 1 capsule by mouth as needed., Disp: , Rfl:   •  celecoxib (CeleBREX) 200 MG capsule, , Disp: , Rfl: 4  •  chlorthalidone (HYGROTON) 25 MG tablet, Take 1 tablet by mouth Daily., Disp: 90 tablet, Rfl: 3  •  chlorthalidone (HYGROTON) 25 MG tablet, Take 25 mg by mouth., Disp: , Rfl:   •  clonazePAM (KlonoPIN) 0.5 MG tablet, Take 0.5 mg by mouth 2 (Two) Times a Day., Disp: , Rfl:   •  cyclobenzaprine (FLEXERIL) 10 MG tablet, Take 10 mg by mouth., Disp: , Rfl:   •  doxepin (SINEquan) 10 MG capsule, , Disp: , Rfl: 4  •  DULoxetine (CYMBALTA) 60 MG capsule, , Disp: , Rfl:   •  dutasteride (AVODART) 0.5 MG capsule, TAKE 1 CAPSULE BY MOUTH ONE TIME A DAY, Disp: , Rfl:   •  fluocinonide (LIDEX) 0.05 % ointment, APPLY ONE APPLICATION TO THE AFFECTED AREA ONCE DAILY AS needed (not for face or genitals), Disp: , Rfl:   •  gabapentin (NEURONTIN) 800 MG tablet, Take 800 mg by mouth 3 (Three) Times a Day., Disp: , Rfl:   •  glucose blood test strip, Test qd, Disp: 100 each, Rfl: 1  •  glucose monitor monitoring kit, As directed, Disp: 1 each, Rfl: 0  •  Lancets misc, Test qd, Disp: 100 each, Rfl: 1  •  MOVANTIK 25 MG tablet, , Disp: , Rfl: 0  •  nystatin-triamcinolone (MYCOLOG II) cream, Apply  topically 3 (three) times a day.,  Disp: , Rfl:   •  Omega-3 Fatty Acids (FISH OIL) 1000 MG capsule capsule, Take  by mouth 2 (Two) Times a Day With Meals., Disp: , Rfl:   •  omeprazole (priLOSEC) 40 MG capsule, Take 40 mg by mouth Daily., Disp: , Rfl:   •  oxyCODONE-acetaminophen (PERCOCET)  MG per tablet, Take 1 tablet by mouth., Disp: , Rfl:   •  pramipexole (MIRAPEX) 1 MG tablet, Take 1 mg by mouth 2 (Two) Times a Day., Disp: , Rfl:   •  pravastatin (PRAVACHOL) 40 MG tablet, Take one every evening, Disp: 90 tablet, Rfl: 3  •  rotigotine (NEUPRO) 4 MG/24HR patch, Place 1 patch on the skin every night at bedtime., Disp: , Rfl:   •  spironolactone (ALDACTONE) 25 MG tablet, Take 25 mg by mouth Daily., Disp: , Rfl:   •  SUMAtriptan (IMITREX) 50 MG tablet, Take 50 mg by mouth., Disp: , Rfl:   •  valsartan-hydrochlorothiazide (DIOVAN-HCT) 80-12.5 MG per tablet, Daily., Disp: , Rfl:     Assessment/Plan   Gonzalo was seen today for establish care.    Diagnoses and all orders for this visit:    Restless leg  Recommend compression hose and continue exercise as recommended by physical therapy.  Dyspnea on exertion  Discussed worsening signs and symptoms with patient and wife.  Essential hypertension  Initiate lifestyle modifications.   DASH Diet and exercise.   Compliance with medication regimen and discussed ways to prevent of long-term complications from high blood pressure.  Discussed when to seek medical attention.  Encouraged patient to take blood pressure daily and keep a log.  Recommend patient go back on lisinopril and discontinue valsartan to see if it improves edema     Venous insufficiency  Advised patient to wear compression hose and elevate legs and avoid long periods of sitting or standing.  Other orders  -     glucose monitor monitoring kit; As directed  -     glucose blood test strip; Test qd  -     Lancets misc; Test qd        Return in about 4 weeks (around 6/7/2018), or if symptoms worsen or fail to improve, for follow up with   [facility].    Divine Perez, APRN  05/10/2018

## 2018-05-23 ENCOUNTER — OFFICE VISIT (OUTPATIENT)
Dept: INTERNAL MEDICINE | Facility: CLINIC | Age: 63
End: 2018-05-23

## 2018-05-23 VITALS
TEMPERATURE: 98 F | WEIGHT: 267.13 LBS | SYSTOLIC BLOOD PRESSURE: 134 MMHG | OXYGEN SATURATION: 99 % | HEART RATE: 74 BPM | BODY MASS INDEX: 37.4 KG/M2 | HEIGHT: 71 IN | RESPIRATION RATE: 16 BRPM | DIASTOLIC BLOOD PRESSURE: 82 MMHG

## 2018-05-23 DIAGNOSIS — M25.562 ACUTE PAIN OF LEFT KNEE: Primary | ICD-10-CM

## 2018-05-23 PROCEDURE — 99213 OFFICE O/P EST LOW 20 MIN: CPT | Performed by: NURSE PRACTITIONER

## 2018-05-23 RX ORDER — LISINOPRIL 20 MG/1
TABLET ORAL
COMMUNITY
End: 2018-11-24 | Stop reason: HOSPADM

## 2018-05-23 NOTE — PROGRESS NOTES
Chief Complaint / Reason:      Chief Complaint   Patient presents with   • Joint Swelling     left knee swelling       Subjective     HPI  Patient presents today for complaints of left knee swelling and pain.  He states that he has tried to decrease in the has only provided minimal relief.  He states that it is swollen and he showed it to the physical therapist and they recommend he see his primary care.  Patient has had joint effusions before in the past.  Denies any numbness and tingling and he states that his sensation is improving since he recently had back surgery.  He states that over the past week the pain has worsened along with the swelling.  History taken from: patient    PMH/FH/Social History were reviewed and updated appropriately in the electronic medical record.     Review of Systems:   Review of Systems   Constitutional: Positive for activity change and fatigue.   Respiratory: Negative.    Cardiovascular: Negative.    Musculoskeletal: Positive for arthralgias, back pain, gait problem, joint swelling and myalgias.   Psychiatric/Behavioral: Positive for sleep disturbance.     All other systems were reviewed and are negative.  Exceptions are noted in the subjective or above.      Objective     Vital Signs  Vitals:    05/23/18 1622   BP: 134/82   Pulse: 74   Resp: 16   Temp: 98 °F (36.7 °C)   SpO2: 99%       Body mass index is 37.26 kg/m².    Physical Exam   Constitutional: He is oriented to person, place, and time. He appears well-developed and well-nourished.   Cardiovascular: Normal rate, regular rhythm, normal heart sounds and intact distal pulses.    Pulmonary/Chest: Effort normal and breath sounds normal. He exhibits no tenderness.   Abdominal: Soft. Bowel sounds are normal.   Musculoskeletal: He exhibits edema, tenderness and deformity.        Left knee: He exhibits decreased range of motion, swelling, effusion, deformity and bony tenderness. Tenderness found. Patellar tendon tenderness noted.    Neurological: He is alert and oriented to person, place, and time.   Skin: Skin is warm and dry.   Psychiatric: He has a normal mood and affect. His behavior is normal. Judgment and thought content normal.   Nursing note and vitals reviewed.       Results Review:    I reviewed the patient's previous clinical results.       Medication Review:     Current Outpatient Prescriptions:   •  aspirin 81 MG tablet, Take 81 mg by mouth., Disp: , Rfl:   •  butalbital-acetaminophen-caffeine (FIORICET) -40 MG capsule capsule, Take 1 capsule by mouth as needed., Disp: , Rfl:   •  chlorthalidone (HYGROTON) 25 MG tablet, Take 1 tablet by mouth Daily., Disp: 90 tablet, Rfl: 3  •  chlorthalidone (HYGROTON) 25 MG tablet, Take 25 mg by mouth., Disp: , Rfl:   •  Cholecalciferol (VITAMIN D3) 5000 units capsule capsule, Take 5,000 Units by mouth Daily., Disp: , Rfl:   •  clonazePAM (KlonoPIN) 0.5 MG tablet, Take 0.5 mg by mouth 2 (Two) Times a Day., Disp: , Rfl:   •  cyclobenzaprine (FLEXERIL) 10 MG tablet, Take 10 mg by mouth., Disp: , Rfl:   •  DULoxetine (CYMBALTA) 60 MG capsule, , Disp: , Rfl:   •  fluocinonide (LIDEX) 0.05 % ointment, APPLY ONE APPLICATION TO THE AFFECTED AREA ONCE DAILY AS needed (not for face or genitals), Disp: , Rfl:   •  gabapentin (NEURONTIN) 800 MG tablet, Take 800 mg by mouth 3 (Three) Times a Day., Disp: , Rfl:   •  glucose blood test strip, Test qd, Disp: 100 each, Rfl: 1  •  glucose monitor monitoring kit, As directed, Disp: 1 each, Rfl: 0  •  Lancets misc, Test qd, Disp: 100 each, Rfl: 1  •  lisinopril (PRINIVIL,ZESTRIL) 20 MG tablet, lisinopril 20 mg tablet, Disp: , Rfl:   •  MOVANTIK 25 MG tablet, , Disp: , Rfl: 0  •  nystatin-triamcinolone (MYCOLOG II) cream, Apply  topically 3 (three) times a day., Disp: , Rfl:   •  Omega-3 Fatty Acids (FISH OIL) 1000 MG capsule capsule, Take  by mouth 2 (Two) Times a Day With Meals., Disp: , Rfl:   •  omeprazole (priLOSEC) 40 MG capsule, Take 40 mg by mouth  Daily., Disp: , Rfl:   •  oxyCODONE-acetaminophen (PERCOCET)  MG per tablet, Take 1 tablet by mouth., Disp: , Rfl:   •  pramipexole (MIRAPEX) 1 MG tablet, Take 1 mg by mouth 2 (Two) Times a Day., Disp: , Rfl:   •  pravastatin (PRAVACHOL) 40 MG tablet, Take one every evening, Disp: 90 tablet, Rfl: 3  •  pravastatin (PRAVACHOL) 40 MG tablet, Take 40 mg by mouth Daily., Disp: , Rfl:   •  Probiotic Product (ALIGN) 4 MG capsule, Take  by mouth Daily., Disp: , Rfl:   •  rotigotine (NEUPRO) 4 MG/24HR patch, Place 1 patch on the skin every night at bedtime., Disp: , Rfl:   •  spironolactone (ALDACTONE) 25 MG tablet, Take 25 mg by mouth Daily., Disp: , Rfl:   •  SUMAtriptan (IMITREX) 50 MG tablet, Take 50 mg by mouth., Disp: , Rfl:     Assessment/Plan   Gonzalo was seen today for joint swelling.    Diagnoses and all orders for this visit:    Acute pain of left knee  -     XR Knee 3 View Left    Recommend rest, ice, elevation.  Avoid overuse  Discussed fall prevention and safety.    Return if symptoms worsen or fail to improve.  Keep scheduled appointment    ZAHIDA Parks  05/23/2018

## 2018-05-24 ENCOUNTER — HOSPITAL ENCOUNTER (OUTPATIENT)
Dept: GENERAL RADIOLOGY | Facility: HOSPITAL | Age: 63
Discharge: HOME | End: 2018-05-24
Attending: NURSE PRACTITIONER | Admitting: NURSE PRACTITIONER

## 2018-05-24 PROCEDURE — 73562 X-RAY EXAM OF KNEE 3: CPT

## 2018-05-25 DIAGNOSIS — M25.462 KNEE EFFUSION, LEFT: Primary | ICD-10-CM

## 2018-05-30 DIAGNOSIS — M25.562 LEFT KNEE PAIN, UNSPECIFIED CHRONICITY: Primary | ICD-10-CM

## 2018-05-31 ENCOUNTER — OFFICE VISIT (OUTPATIENT)
Dept: ORTHOPEDIC SURGERY | Facility: CLINIC | Age: 63
End: 2018-05-31

## 2018-05-31 VITALS — HEIGHT: 71 IN | BODY MASS INDEX: 37.38 KG/M2 | WEIGHT: 267 LBS | RESPIRATION RATE: 18 BRPM

## 2018-05-31 DIAGNOSIS — M25.562 LEFT KNEE PAIN, UNSPECIFIED CHRONICITY: ICD-10-CM

## 2018-05-31 DIAGNOSIS — M17.10 PRIMARY LOCALIZED OSTEOARTHROSIS OF LOWER LEG, UNSPECIFIED LATERALITY: Primary | ICD-10-CM

## 2018-05-31 PROCEDURE — 20610 DRAIN/INJ JOINT/BURSA W/O US: CPT | Performed by: ORTHOPAEDIC SURGERY

## 2018-05-31 PROCEDURE — 99204 OFFICE O/P NEW MOD 45 MIN: CPT | Performed by: ORTHOPAEDIC SURGERY

## 2018-05-31 RX ORDER — GABAPENTIN 800 MG/1
TABLET ORAL EVERY 8 HOURS SCHEDULED
COMMUNITY
End: 2018-06-27 | Stop reason: HOSPADM

## 2018-05-31 RX ORDER — CYCLOBENZAPRINE HCL 10 MG
TABLET ORAL EVERY 8 HOURS SCHEDULED
COMMUNITY
End: 2019-02-05 | Stop reason: SDUPTHER

## 2018-05-31 RX ORDER — LIDOCAINE HYDROCHLORIDE 10 MG/ML
2 INJECTION, SOLUTION INFILTRATION; PERINEURAL
Status: COMPLETED | OUTPATIENT
Start: 2018-05-31 | End: 2018-05-31

## 2018-05-31 RX ORDER — TRIAMCINOLONE ACETONIDE 40 MG/ML
40 INJECTION, SUSPENSION INTRA-ARTICULAR; INTRAMUSCULAR
Status: COMPLETED | OUTPATIENT
Start: 2018-05-31 | End: 2018-05-31

## 2018-05-31 RX ADMIN — LIDOCAINE HYDROCHLORIDE 2 ML: 10 INJECTION, SOLUTION INFILTRATION; PERINEURAL at 12:07

## 2018-05-31 RX ADMIN — TRIAMCINOLONE ACETONIDE 40 MG: 40 INJECTION, SUSPENSION INTRA-ARTICULAR; INTRAMUSCULAR at 12:07

## 2018-05-31 NOTE — PROGRESS NOTES
Subjective   Patient ID: Gonzalo Oates is a 63 y.o. male  Pain of the Left Knee (Patient states in April 2018 he had 2 back infusions and was told to walk, since walking his left knee has started hurting, swelling, and keeping him up at night. He says the right one hurts as well but not as bad as the left.) and Pain of the Right Knee             History of Present Illness    Left knee pain with swelling he attributes to a lot of walking he's been doing recovering from lower back surgery had a lengthy instrumented fusion was just recently told to walk as much as he can until his next visit in 2 months at which time the plan to start him back on formal outpatient therapy.  He has history of intermittent swelling of both knees has been told he has arthritis has taken anti-inflammatories in the past but unable to do anti-inflammatory medications now because of his lumbar fusion.    Used to do a lot of pool therapy in the past and is interested in doing that again.    Walks currently with a cane has used a walker until most recently.    Denies any buckling walking giving way, left knee hurts more than the right, has history of restless leg syndrome neuropathy symptoms and radiculopathy of both legs    Followed by a spine surgeon for balance disorder or leg weakness and radiculopathy    Has been receiving chronic Percocet since 3 years ago.    Review of Systems   Constitutional: Negative for fever.   HENT: Negative for voice change.    Eyes: Negative for visual disturbance.   Respiratory: Negative for shortness of breath.    Cardiovascular: Negative for chest pain.   Gastrointestinal: Negative for abdominal distention and abdominal pain.   Genitourinary: Negative for dysuria.   Musculoskeletal: Positive for arthralgias. Negative for gait problem and joint swelling.   Skin: Negative for rash.   Neurological: Negative for speech difficulty.   Hematological: Does not bruise/bleed easily.   Psychiatric/Behavioral:  Negative for confusion.       Past Medical History:   Diagnosis Date   • Ankle fracture     left 02/2015   • Anxiety    • Asthma    • BPH (benign prostatic hyperplasia)    • Cataract    • Chronic back pain    • Chronic sinusitis    • Colon polyps    • Deep vein thrombosis (DVT) of iliac vein    • Depression    • Dyspnea on exertion    • Erectile dysfunction    • GERD (gastroesophageal reflux disease)    • Hypertension    • Irritable bowel    • Migraine headache    • Narcolepsy    • Obesity    • KO (obstructive sleep apnea)     non compliant with CPAP    • Prediabetes    • Restless leg     supressed with narcotics    • Snoring    • UTI (urinary tract infection)    • Varicose veins    • Venous insufficiency         Past Surgical History:   Procedure Laterality Date   • APPENDECTOMY     • BACK SURGERY     • BACK SURGERY     • FRACTURE SURGERY     • HERNIA REPAIR     • LAMINECTOMY     • SINUS SURGERY     • TONSILLECTOMY     • UMBILICAL HERNIA REPAIR     • VASECTOMY         Family History   Problem Relation Age of Onset   • Cancer Other    • Diabetes Other    • Heart disease Other    • Hypertension Other    • Endocrine tumor Mother    • Anxiety disorder Mother    • Arthritis Mother    • Hypertension Mother    • Heart attack Father    • Hypertension Father    • Glaucoma Father    • Cancer Maternal Grandmother        Social History     Social History   • Marital status:      Spouse name: N/A   • Number of children: N/A   • Years of education: N/A     Occupational History   • Not on file.     Social History Main Topics   • Smoking status: Never Smoker   • Smokeless tobacco: Never Used   • Alcohol use Yes      Comment: 3 shots of liquor a week   • Drug use: No   • Sexual activity: No     Other Topics Concern   • Not on file     Social History Narrative   • No narrative on file       I have reviewed all of the above social hx, family hx, surgical hx, medications, allergies & ROS and confirm that it is  accurate.    Allergies   Allergen Reactions   • Calcium Channel Blockers Swelling     Lower extremity edema   Lower extremity edema   Lower extremity edema    • Adhesive Tape Rash   • Nickel Swelling   • Zinc Oxide Unknown (See Comments) and Rash     SENSITIVITY ,CAUSES RASH  SENSITIVITY ,CAUSES RASH         Current Outpatient Prescriptions:   •  aspirin 81 MG tablet, Take 81 mg by mouth., Disp: , Rfl:   •  aspirin 81 MG tablet, aspirin 81 mg tablet  Take by oral route., Disp: , Rfl:   •  butalbital-acetaminophen-caffeine (FIORICET) -40 MG capsule capsule, Take 1 capsule by mouth as needed., Disp: , Rfl:   •  chlorthalidone (HYGROTON) 25 MG tablet, Take 1 tablet by mouth Daily., Disp: 90 tablet, Rfl: 3  •  chlorthalidone (HYGROTON) 25 MG tablet, Take 25 mg by mouth., Disp: , Rfl:   •  Cholecalciferol (VITAMIN D3) 5000 units capsule capsule, Take 5,000 Units by mouth Daily., Disp: , Rfl:   •  clonazePAM (KlonoPIN) 0.5 MG tablet, Take 0.5 mg by mouth 2 (Two) Times a Day., Disp: , Rfl:   •  cyclobenzaprine (FLEXERIL) 10 MG tablet, Take 10 mg by mouth., Disp: , Rfl:   •  cyclobenzaprine (FLEXERIL) 10 MG tablet, Every 8 (Eight) Hours., Disp: , Rfl:   •  DULoxetine (CYMBALTA) 60 MG capsule, , Disp: , Rfl:   •  fluocinonide (LIDEX) 0.05 % ointment, APPLY ONE APPLICATION TO THE AFFECTED AREA ONCE DAILY AS needed (not for face or genitals), Disp: , Rfl:   •  gabapentin (NEURONTIN) 800 MG tablet, Take 800 mg by mouth 3 (Three) Times a Day., Disp: , Rfl:   •  gabapentin (NEURONTIN) 800 MG tablet, Every 8 (Eight) Hours., Disp: , Rfl:   •  glucose blood test strip, Test qd, Disp: 100 each, Rfl: 1  •  glucose monitor monitoring kit, As directed, Disp: 1 each, Rfl: 0  •  Lancets misc, Test qd, Disp: 100 each, Rfl: 1  •  lisinopril (PRINIVIL,ZESTRIL) 20 MG tablet, lisinopril 20 mg tablet, Disp: , Rfl:   •  MOVANTIK 25 MG tablet, , Disp: , Rfl: 0  •  nystatin-triamcinolone (MYCOLOG II) cream, Apply  topically 3 (three) times a  "day., Disp: , Rfl:   •  Omega-3 Fatty Acids (FISH OIL) 1000 MG capsule capsule, Take  by mouth 2 (Two) Times a Day With Meals., Disp: , Rfl:   •  omeprazole (priLOSEC) 40 MG capsule, Take 40 mg by mouth Daily., Disp: , Rfl:   •  oxyCODONE-acetaminophen (PERCOCET)  MG per tablet, Take 1 tablet by mouth., Disp: , Rfl:   •  pramipexole (MIRAPEX) 1 MG tablet, Take 1 mg by mouth 2 (Two) Times a Day., Disp: , Rfl:   •  pravastatin (PRAVACHOL) 40 MG tablet, Take one every evening, Disp: 90 tablet, Rfl: 3  •  pravastatin (PRAVACHOL) 40 MG tablet, Take 40 mg by mouth Daily., Disp: , Rfl:   •  Probiotic Product (ALIGN) 4 MG capsule, Take  by mouth Daily., Disp: , Rfl:   •  rotigotine (NEUPRO) 4 MG/24HR patch, Place 1 patch on the skin every night at bedtime., Disp: , Rfl:   •  spironolactone (ALDACTONE) 25 MG tablet, Take 25 mg by mouth Daily., Disp: , Rfl:   •  SUMAtriptan (IMITREX) 50 MG tablet, Take 50 mg by mouth., Disp: , Rfl:     Objective   Resp 18   Ht 180.3 cm (71\")   Wt 121 kg (267 lb)   BMI 37.24 kg/m²    Physical Exam  Constitutional: Patient is oriented to person, place, and time. Patient appears well-developed and well-nourished.   HENT:Head: Normocephalic and atraumatic.   Eyes: EOM are normal. Pupils are equal, round, and reactive to light.   Neck: Normal range of motion. Neck supple.   Cardiovascular: Normal rate.    Pulmonary/Chest: Effort normal and breath sounds normal.   Abdominal: Soft.   Neurological: Patient is alert and oriented to person, place, and time.   Skin: Skin is warm and dry.   Psychiatric: Patient has a normal mood and affect.   Nursing note and vitals reviewed.       Ortho Exam   Left knee: 1+ effusion extension -5 flexion 130 no instability positive anteromedial and patellofemoral pain with range of motion calf supple neurovascularly grossly intact.    Right knee: No effusion full extension flexion 140 good stability negative calf tenderness minimal patellofemoral crepitus slight " patellofemoral pain with range of motion.    Assessment/Plan   Review of Radiographic Studies:    Radiographic images today of affected area I personally viewed and showed no sign of acute fracture or dislocation.      Large Joint Arthrocentesis  Date/Time: 5/31/2018 12:07 PM  Consent given by: patient  Site marked: site marked  Timeout: Immediately prior to procedure a time out was called to verify the correct patient, procedure, equipment, support staff and site/side marked as required   Procedure Details  Location: knee - L knee  Preparation: Patient was prepped and draped in the usual sterile fashion  Needle size: 22 G  Medications administered: 2 mL lidocaine 1 %; 40 mg triamcinolone acetonide 40 MG/ML  Patient tolerance: patient tolerated the procedure well with no immediate complications              Physical therapy referral given      Recommendations/Plan:   Exercise, medications, injections, other patient advice, and return appointment as noted.    Patient agreeable to call or return sooner for any concerns.             Impression:   Left knee and right knee minimal medial compartment osteoarthritis bilateral patellofemoral symptoms, left knee slight effusion rule out chondral pathology or meniscal tear  Plan:  MR left knee, pull therapy on his own, increased Tylenol to acceptable limits to supplement his chronic Percocet usage

## 2018-06-01 ENCOUNTER — APPOINTMENT (OUTPATIENT)
Dept: MRI IMAGING | Facility: HOSPITAL | Age: 63
End: 2018-06-01
Attending: ORTHOPAEDIC SURGERY

## 2018-06-08 ENCOUNTER — OFFICE VISIT (OUTPATIENT)
Dept: INTERNAL MEDICINE | Facility: CLINIC | Age: 63
End: 2018-06-08

## 2018-06-08 VITALS
BODY MASS INDEX: 37.12 KG/M2 | WEIGHT: 265.13 LBS | TEMPERATURE: 98.3 F | HEART RATE: 70 BPM | SYSTOLIC BLOOD PRESSURE: 130 MMHG | RESPIRATION RATE: 16 BRPM | DIASTOLIC BLOOD PRESSURE: 74 MMHG | OXYGEN SATURATION: 97 % | HEIGHT: 71 IN

## 2018-06-08 DIAGNOSIS — M17.12 ARTHRITIS OF LEFT KNEE: ICD-10-CM

## 2018-06-08 DIAGNOSIS — E11.9 TYPE 2 DIABETES MELLITUS WITHOUT COMPLICATION, WITHOUT LONG-TERM CURRENT USE OF INSULIN (HCC): Primary | ICD-10-CM

## 2018-06-08 DIAGNOSIS — I10 ESSENTIAL HYPERTENSION: ICD-10-CM

## 2018-06-08 LAB — HBA1C MFR BLD: 6 %

## 2018-06-08 PROCEDURE — 99214 OFFICE O/P EST MOD 30 MIN: CPT | Performed by: NURSE PRACTITIONER

## 2018-06-08 PROCEDURE — 83036 HEMOGLOBIN GLYCOSYLATED A1C: CPT | Performed by: NURSE PRACTITIONER

## 2018-06-08 RX ORDER — ACETAMINOPHEN 500 MG
500 TABLET ORAL 2 TIMES DAILY
COMMUNITY
End: 2018-11-24 | Stop reason: HOSPADM

## 2018-06-08 RX ORDER — PHENOL 1.4 %
500 AEROSOL, SPRAY (ML) MUCOUS MEMBRANE DAILY
COMMUNITY
End: 2018-11-24 | Stop reason: HOSPADM

## 2018-06-08 RX ORDER — OXYCODONE AND ACETAMINOPHEN 10; 325 MG/1; MG/1
1 TABLET ORAL EVERY 4 HOURS PRN
COMMUNITY
End: 2019-04-08

## 2018-06-08 NOTE — PROGRESS NOTES
Chief Complaint / Reason:      Chief Complaint   Patient presents with   • Blood Sugar Problem     Had an MRI done yest. per Dr. Hoffman.        Subjective     HPI  Patient presents today for follow-up regarding blood sugar he states that he was able to get his glucometer and he has connected with his phone and his fasting blood glucoses have ranged from 109-127.  Previous hgb A1c was 6.6 on 3/6/18 and will be rechecked today.  Patient states he has lost about 20 pounds and he has tried adhering to a diet and increasing his activity level as much as his back will allow.  Vital signs are stable with slightly elevated blood pressure.  Patient states that the swelling in his left knee has decreased as he has not been walking on it is much.  Patient did see Dr. Hoffman ordered an MRI of knee and did do x-rays.  Patient had previously had x-rays which showed moderate to large effusion.  Patient states that his ankle on the left side has been hurt in that he has injured it in the past.  Denies any falls.     History taken from: patient    PMH/FH/Social History were reviewed and updated appropriately in the electronic medical record.     Review of Systems:   Review of Systems   Constitutional: Negative.    Respiratory: Negative.    Cardiovascular: Negative.    Musculoskeletal: Positive for arthralgias, back pain, gait problem, joint swelling and myalgias.   Psychiatric/Behavioral: Positive for sleep disturbance.     All other systems were reviewed and are negative.  Exceptions are noted in the subjective or above.      Objective     Vital Signs  Vitals:    06/08/18 1006   BP: 130/74   Pulse: 70   Resp: 16   Temp: 98.3 °F (36.8 °C)   SpO2: 97%       Body mass index is 36.98 kg/m².    Physical Exam   Constitutional: He is oriented to person, place, and time. He appears well-developed and well-nourished.   Cardiovascular: Normal rate, regular rhythm, normal heart sounds and intact distal pulses.    Pulmonary/Chest: Effort  normal and breath sounds normal. He exhibits no tenderness.   Abdominal: Soft. Bowel sounds are normal.   Musculoskeletal: He exhibits tenderness and deformity. He exhibits no edema.        Left knee: He exhibits normal range of motion and no swelling. Tenderness found.   Neurological: He is alert and oriented to person, place, and time.   Skin: Skin is warm and dry.   Psychiatric: He has a normal mood and affect. His behavior is normal. Judgment and thought content normal.   Nursing note and vitals reviewed.       Results Review:    I reviewed the patient's new clinical results.   Office Visit on 06/08/2018   Component Date Value Ref Range Status   • Hemoglobin A1C 06/08/2018 6.0  % Final         Medication Review:     Current Outpatient Prescriptions:   •  acetaminophen (TYLENOL) 500 MG tablet, Take 500 mg by mouth 2 (Two) Times a Day., Disp: , Rfl:   •  aspirin 81 MG tablet, aspirin 81 mg tablet  Take by oral route., Disp: , Rfl:   •  butalbital-acetaminophen-caffeine (FIORICET) -40 MG capsule capsule, Take 1 capsule by mouth as needed., Disp: , Rfl:   •  calcium carbonate (OS-SO) 600 MG tablet, Take 500 mg by mouth Daily., Disp: , Rfl:   •  chlorthalidone (HYGROTON) 25 MG tablet, Take 25 mg by mouth., Disp: , Rfl:   •  Cholecalciferol (VITAMIN D3) 5000 units capsule capsule, Take 5,000 Units by mouth Daily., Disp: , Rfl:   •  clonazePAM (KlonoPIN) 0.5 MG tablet, Take 0.5 mg by mouth 2 (Two) Times a Day., Disp: , Rfl:   •  cyclobenzaprine (FLEXERIL) 10 MG tablet, Every 8 (Eight) Hours., Disp: , Rfl:   •  DULoxetine (CYMBALTA) 60 MG capsule, , Disp: , Rfl:   •  fluocinonide (LIDEX) 0.05 % ointment, APPLY ONE APPLICATION TO THE AFFECTED AREA ONCE DAILY AS needed (not for face or genitals), Disp: , Rfl:   •  gabapentin (NEURONTIN) 800 MG tablet, Take 800 mg by mouth 3 (Three) Times a Day., Disp: , Rfl:   •  gabapentin (NEURONTIN) 800 MG tablet, Every 8 (Eight) Hours., Disp: , Rfl:   •  glucose blood test  strip, Test qd, Disp: 100 each, Rfl: 1  •  glucose monitor monitoring kit, As directed, Disp: 1 each, Rfl: 0  •  Lancets misc, Test qd, Disp: 100 each, Rfl: 1  •  lisinopril (PRINIVIL,ZESTRIL) 20 MG tablet, lisinopril 20 mg tablet, Disp: , Rfl:   •  nystatin-triamcinolone (MYCOLOG II) cream, Apply  topically 3 (three) times a day., Disp: , Rfl:   •  Omega-3 Fatty Acids (FISH OIL) 1000 MG capsule capsule, Take  by mouth 2 (Two) Times a Day With Meals., Disp: , Rfl:   •  omeprazole (priLOSEC) 40 MG capsule, Take 40 mg by mouth Daily., Disp: , Rfl:   •  oxyCODONE-acetaminophen (PERCOCET) 7.5-325 MG per tablet, Take 1 tablet by mouth 2 (Two) Times a Day., Disp: , Rfl:   •  pramipexole (MIRAPEX) 1 MG tablet, Take 1 mg by mouth 2 (Two) Times a Day., Disp: , Rfl:   •  pravastatin (PRAVACHOL) 40 MG tablet, Take 40 mg by mouth Daily., Disp: , Rfl:   •  rotigotine (NEUPRO) 4 MG/24HR patch, Place 1 patch on the skin every night at bedtime., Disp: , Rfl:   •  spironolactone (ALDACTONE) 25 MG tablet, Take 25 mg by mouth Daily., Disp: , Rfl:   •  SUMAtriptan (IMITREX) 50 MG tablet, Take 50 mg by mouth., Disp: , Rfl:     Assessment/Plan   Gonzalo was seen today for blood sugar problem.    Diagnoses and all orders for this visit:    Type 2 diabetes mellitus without complication, without long-term current use of insulin  -     POC Glycosylated Hemoglobin (Hb A1C)  Continue dietary modification and weight loss and exercise recommended patient maintain hydration  Essential hypertension  Stable on medication   Arthritis of left knee  Follow up with Dr. Hoffman and recommend record of MRI be sent to office when obtained.   Use can for ambulation  Fall prevention and safety  Return in about 3 months (around 9/8/2018), or if symptoms worsen or fail to improve.    Divine Perez, APRN  06/08/2018

## 2018-06-12 ENCOUNTER — OFFICE VISIT (OUTPATIENT)
Dept: ORTHOPEDIC SURGERY | Facility: CLINIC | Age: 63
End: 2018-06-12

## 2018-06-12 VITALS — WEIGHT: 264 LBS | HEIGHT: 71 IN | RESPIRATION RATE: 18 BRPM | BODY MASS INDEX: 36.96 KG/M2

## 2018-06-12 DIAGNOSIS — S83.232D COMPLEX TEAR OF MEDIAL MENISCUS OF LEFT KNEE AS CURRENT INJURY, SUBSEQUENT ENCOUNTER: Primary | ICD-10-CM

## 2018-06-12 PROBLEM — S83.231A COMPLEX TEAR OF MEDIAL MENISCUS OF RIGHT KNEE AS CURRENT INJURY: Status: ACTIVE | Noted: 2018-06-12

## 2018-06-12 PROCEDURE — 99214 OFFICE O/P EST MOD 30 MIN: CPT | Performed by: ORTHOPAEDIC SURGERY

## 2018-06-12 NOTE — PROGRESS NOTES
Subjective   Patient ID: Gonzalo Oates is a 63 y.o. male  Follow-up and Pain of the Left Knee (Patient is here today for MRI results and follow up. He states his knee is not as bad as it was, but it's a different pain and he has not been walking as much.)             History of Present Illness    Here for follow-up left knee MRI which confirms small posterior horn meniscus tear and a small osteochondral defect on the femoral condyle.  His knee pain is improved since he had his injection last visit, has not started his own therapy yet is planning to join silver sneakers, still walks with a cane.    Complains of left lower ankle and foot pain chronic nature has seen multiple consultants for his ankle and foot problems underwent ORIF subsequent removal of plate has chronic pain in the ankle has been told he has a painful nerve injury around his ankle from the prior surgeries.    Medical health otherwise stable      Review of Systems   Constitutional: Negative for fever.   HENT: Negative for voice change.    Eyes: Negative for visual disturbance.   Respiratory: Negative for shortness of breath.    Cardiovascular: Negative for chest pain.   Gastrointestinal: Negative for abdominal distention and abdominal pain.   Genitourinary: Negative for dysuria.   Musculoskeletal: Positive for arthralgias. Negative for gait problem and joint swelling.   Skin: Negative for rash.   Neurological: Negative for speech difficulty.   Hematological: Does not bruise/bleed easily.   Psychiatric/Behavioral: Negative for confusion.       Past Medical History:   Diagnosis Date   • Ankle fracture     left 02/2015   • Anxiety    • Asthma    • BPH (benign prostatic hyperplasia)    • Cataract    • Chronic back pain    • Chronic sinusitis    • Colon polyps    • Deep vein thrombosis (DVT) of iliac vein    • Depression    • Dyspnea on exertion    • Erectile dysfunction    • GERD (gastroesophageal reflux disease)    • Hypertension    • Irritable  bowel    • Migraine headache    • Narcolepsy    • Obesity    • KO (obstructive sleep apnea)     non compliant with CPAP    • Prediabetes    • Restless leg     supressed with narcotics    • Snoring    • UTI (urinary tract infection)    • Varicose veins    • Venous insufficiency         Past Surgical History:   Procedure Laterality Date   • APPENDECTOMY     • BACK SURGERY     • BACK SURGERY     • FRACTURE SURGERY     • HERNIA REPAIR     • LAMINECTOMY     • SINUS SURGERY     • TONSILLECTOMY     • UMBILICAL HERNIA REPAIR     • VASECTOMY         Family History   Problem Relation Age of Onset   • Cancer Other    • Diabetes Other    • Heart disease Other    • Hypertension Other    • Endocrine tumor Mother    • Anxiety disorder Mother    • Arthritis Mother    • Hypertension Mother    • Heart attack Father    • Hypertension Father    • Glaucoma Father    • Cancer Maternal Grandmother        Social History     Social History   • Marital status:      Spouse name: N/A   • Number of children: N/A   • Years of education: N/A     Occupational History   • Not on file.     Social History Main Topics   • Smoking status: Never Smoker   • Smokeless tobacco: Never Used   • Alcohol use Yes      Comment: 3 shots of liquor a week   • Drug use: No   • Sexual activity: No     Other Topics Concern   • Not on file     Social History Narrative   • No narrative on file       I have reviewed all of the above social hx, family hx, surgical hx, medications, allergies & ROS and confirm that it is accurate.    Allergies   Allergen Reactions   • Calcium Channel Blockers Swelling     Lower extremity edema   Lower extremity edema   Lower extremity edema    • Adhesive Tape Rash   • Nickel Swelling   • Zinc Oxide Unknown (See Comments) and Rash     SENSITIVITY ,CAUSES RASH  SENSITIVITY ,CAUSES RASH         Current Outpatient Prescriptions:   •  acetaminophen (TYLENOL) 500 MG tablet, Take 500 mg by mouth 2 (Two) Times a Day., Disp: , Rfl:   •   aspirin 81 MG tablet, aspirin 81 mg tablet  Take by oral route., Disp: , Rfl:   •  butalbital-acetaminophen-caffeine (FIORICET) -40 MG capsule capsule, Take 1 capsule by mouth as needed., Disp: , Rfl:   •  calcium carbonate (OS-SO) 600 MG tablet, Take 500 mg by mouth Daily., Disp: , Rfl:   •  chlorthalidone (HYGROTON) 25 MG tablet, Take 25 mg by mouth., Disp: , Rfl:   •  Cholecalciferol (VITAMIN D3) 5000 units capsule capsule, Take 5,000 Units by mouth Daily., Disp: , Rfl:   •  clonazePAM (KlonoPIN) 0.5 MG tablet, Take 0.5 mg by mouth 2 (Two) Times a Day., Disp: , Rfl:   •  cyclobenzaprine (FLEXERIL) 10 MG tablet, Every 8 (Eight) Hours., Disp: , Rfl:   •  DULoxetine (CYMBALTA) 60 MG capsule, , Disp: , Rfl:   •  fluocinonide (LIDEX) 0.05 % ointment, APPLY ONE APPLICATION TO THE AFFECTED AREA ONCE DAILY AS needed (not for face or genitals), Disp: , Rfl:   •  gabapentin (NEURONTIN) 800 MG tablet, Take 800 mg by mouth 3 (Three) Times a Day., Disp: , Rfl:   •  gabapentin (NEURONTIN) 800 MG tablet, Every 8 (Eight) Hours., Disp: , Rfl:   •  glucose blood test strip, Test qd, Disp: 100 each, Rfl: 1  •  glucose monitor monitoring kit, As directed, Disp: 1 each, Rfl: 0  •  Lancets misc, Test qd, Disp: 100 each, Rfl: 1  •  lisinopril (PRINIVIL,ZESTRIL) 20 MG tablet, lisinopril 20 mg tablet, Disp: , Rfl:   •  nystatin-triamcinolone (MYCOLOG II) cream, Apply  topically 3 (three) times a day., Disp: , Rfl:   •  Omega-3 Fatty Acids (FISH OIL) 1000 MG capsule capsule, Take  by mouth 2 (Two) Times a Day With Meals., Disp: , Rfl:   •  omeprazole (priLOSEC) 40 MG capsule, Take 40 mg by mouth Daily., Disp: , Rfl:   •  oxyCODONE-acetaminophen (PERCOCET) 7.5-325 MG per tablet, Take 1 tablet by mouth 2 (Two) Times a Day., Disp: , Rfl:   •  pramipexole (MIRAPEX) 1 MG tablet, Take 1 mg by mouth 2 (Two) Times a Day., Disp: , Rfl:   •  pravastatin (PRAVACHOL) 40 MG tablet, Take 40 mg by mouth Daily., Disp: , Rfl:   •  rotigotine  "(NEUPRO) 4 MG/24HR patch, Place 1 patch on the skin every night at bedtime., Disp: , Rfl:   •  spironolactone (ALDACTONE) 25 MG tablet, Take 25 mg by mouth Daily., Disp: , Rfl:   •  SUMAtriptan (IMITREX) 50 MG tablet, Take 50 mg by mouth., Disp: , Rfl:     Objective   Resp 18   Ht 180.3 cm (71\")   Wt 120 kg (264 lb)   BMI 36.82 kg/m²    Physical Exam  Constitutional: Patient is oriented to person, place, and time. Patient appears well-developed and well-nourished.   HENT:Head: Normocephalic and atraumatic.   Eyes: EOM are normal. Pupils are equal, round, and reactive to light.   Neck: Normal range of motion. Neck supple.   Cardiovascular: Normal rate.    Pulmonary/Chest: Effort normal and breath sounds normal.   Abdominal: Soft.   Neurological: Patient is alert and oriented to person, place, and time.   Skin: Skin is warm and dry.   Psychiatric: Patient has a normal mood and affect.   Nursing note and vitals reviewed.       Ortho Exam     Left knee with no effusion full extension mild patellofemoral crepitus good stability Gale sign negative calf supple with ankle with chronic appearing edema hypersensitivity light touch along the lateral malleolar area.  Assessment/Plan   Review of Radiographic Studies:    No new imaging done today.  MRI with evident meniscal tear noted.      Olimpia Sánchez was seen today for follow-up and pain.    Diagnoses and all orders for this visit:    Complex tear of medial meniscus of left knee as current injury, subsequent encounter       Risk, benefits, and merits of treatment alternatives reviewed with the patient and questions answered and The nature of the proposed surgery reviewed with the patient including risks, benefits, rehabilitation, recovery timeframe, and outcome expectations      Recommendations/Plan:   Work/Activity Status: May perform usual activities as tolerated    Patient agreeable to call or return sooner for any concerns.       I discussed with the " patient the diagnosis, condition, natural history and treatment alternatives, both surgical and nonsurgical.  I reviewed the surgical procedural details using models, diagrams and reviewing x-ray findings.  I explained the nature of the operation, anesthesia methods and the postoperative recovery.  I explained risks and complications including but not limited to infection, bleeding, blood clotting, poor healing, chronic pain, stiffness, failure of the procedure, possible recurrence of the condition and anesthetic related risks.  The patient had opportunity to ask questions which were all answered to their satisfaction and decided to proceed with the plan for surgery.  I believe informed consent to proceed with the surgery was given verbally in my presence today.  The surgical consent form will be signed in the presence of the nursing staff prior to the surgery.    Using diagrams and models I demonstrated to the patient the osteochondral defect and meniscal pathology explained to him he may not have any improvement in knee pain or swelling or mechanical symptoms even with arthroscopic surgery, he does have history of chronic pain and nerve damage from prior ankle surgeries and lumbar back surgery fusions which will preclude this office from prescribing narcotic medications for his left knee arthroscopic surgery.        Impression:  Left knee pain much improved after steroid injection with posterior horn medial meniscal tear and osteochondral defect femoral condyle, chronic narcotic dependence for multiple back surgeries, left ankle foot chronic swelling peripheral nerve injury status post lateral malleolar ORIF and plate removal  Plan:  Patient will deliver disc for image review, call to schedule left knee diagnostic arthroscopy partial medial meniscectomy or other procedures as necessary

## 2018-06-13 ENCOUNTER — PREP FOR SURGERY (OUTPATIENT)
Dept: OTHER | Facility: HOSPITAL | Age: 63
End: 2018-06-13

## 2018-06-13 DIAGNOSIS — Z01.818 PREOP EXAMINATION: Primary | ICD-10-CM

## 2018-06-14 PROBLEM — Z01.818 PREOP EXAMINATION: Status: ACTIVE | Noted: 2018-06-14

## 2018-06-20 ENCOUNTER — HOSPITAL ENCOUNTER (OUTPATIENT)
Dept: GENERAL RADIOLOGY | Facility: HOSPITAL | Age: 63
Discharge: HOME | End: 2018-06-20
Admitting: ORTHOPAEDIC SURGERY

## 2018-06-20 ENCOUNTER — APPOINTMENT (OUTPATIENT)
Dept: PREADMISSION TESTING | Facility: HOSPITAL | Age: 63
End: 2018-06-20

## 2018-06-20 VITALS — HEIGHT: 71 IN | WEIGHT: 270 LBS | BODY MASS INDEX: 37.8 KG/M2

## 2018-06-20 DIAGNOSIS — Z01.818 PREOP EXAMINATION: ICD-10-CM

## 2018-06-20 LAB
ALBUMIN SERPL-MCNC: 4.3 G/DL (ref 3.5–5)
ALBUMIN/GLOB SERPL: 1.5 G/DL (ref 1–2)
ALP SERPL-CCNC: 113 U/L (ref 38–126)
ALT SERPL W P-5'-P-CCNC: 41 U/L (ref 13–69)
ANION GAP SERPL CALCULATED.3IONS-SCNC: 14.9 MMOL/L (ref 10–20)
AST SERPL-CCNC: 32 U/L (ref 15–46)
BASOPHILS # BLD AUTO: 0.02 10*3/MM3 (ref 0–0.2)
BASOPHILS NFR BLD AUTO: 0.3 % (ref 0–2.5)
BILIRUB SERPL-MCNC: 0.4 MG/DL (ref 0.2–1.3)
BUN BLD-MCNC: 25 MG/DL (ref 7–20)
BUN/CREAT SERPL: 27.8 (ref 6.3–21.9)
CALCIUM SPEC-SCNC: 9.4 MG/DL (ref 8.4–10.2)
CHLORIDE SERPL-SCNC: 103 MMOL/L (ref 98–107)
CO2 SERPL-SCNC: 30 MMOL/L (ref 26–30)
CREAT BLD-MCNC: 0.9 MG/DL (ref 0.6–1.3)
DEPRECATED RDW RBC AUTO: 43.2 FL (ref 37–54)
EOSINOPHIL # BLD AUTO: 0.12 10*3/MM3 (ref 0–0.7)
EOSINOPHIL NFR BLD AUTO: 2.1 % (ref 0–7)
ERYTHROCYTE [DISTWIDTH] IN BLOOD BY AUTOMATED COUNT: 13.7 % (ref 11.5–14.5)
GFR SERPL CREATININE-BSD FRML MDRD: 85 ML/MIN/1.73
GLOBULIN UR ELPH-MCNC: 2.8 GM/DL
GLUCOSE BLD-MCNC: 88 MG/DL (ref 74–98)
HCT VFR BLD AUTO: 34.8 % (ref 42–52)
HGB BLD-MCNC: 10.7 G/DL (ref 14–18)
IMM GRANULOCYTES # BLD: 0.01 10*3/MM3 (ref 0–0.06)
IMM GRANULOCYTES NFR BLD: 0.2 % (ref 0–0.6)
LYMPHOCYTES # BLD AUTO: 1.28 10*3/MM3 (ref 0.6–3.4)
LYMPHOCYTES NFR BLD AUTO: 21.9 % (ref 10–50)
MCH RBC QN AUTO: 26.6 PG (ref 27–31)
MCHC RBC AUTO-ENTMCNC: 30.7 G/DL (ref 30–37)
MCV RBC AUTO: 86.6 FL (ref 80–94)
MONOCYTES # BLD AUTO: 0.56 10*3/MM3 (ref 0–0.9)
MONOCYTES NFR BLD AUTO: 9.6 % (ref 0–12)
NEUTROPHILS # BLD AUTO: 3.86 10*3/MM3 (ref 2–6.9)
NEUTROPHILS NFR BLD AUTO: 65.9 % (ref 37–80)
NRBC BLD MANUAL-RTO: 0 /100 WBC (ref 0–0)
PLATELET # BLD AUTO: 191 10*3/MM3 (ref 130–400)
PMV BLD AUTO: 9.9 FL (ref 6–12)
POTASSIUM BLD-SCNC: 3.9 MMOL/L (ref 3.5–5.1)
PROT SERPL-MCNC: 7.1 G/DL (ref 6.3–8.2)
RBC # BLD AUTO: 4.02 10*6/MM3 (ref 4.7–6.1)
SODIUM BLD-SCNC: 144 MMOL/L (ref 137–145)
WBC NRBC COR # BLD: 5.85 10*3/MM3 (ref 4.8–10.8)

## 2018-06-20 PROCEDURE — 80053 COMPREHEN METABOLIC PANEL: CPT | Performed by: ORTHOPAEDIC SURGERY

## 2018-06-20 PROCEDURE — 87081 CULTURE SCREEN ONLY: CPT | Performed by: ORTHOPAEDIC SURGERY

## 2018-06-20 PROCEDURE — 36415 COLL VENOUS BLD VENIPUNCTURE: CPT

## 2018-06-20 PROCEDURE — 85025 COMPLETE CBC W/AUTO DIFF WBC: CPT | Performed by: ORTHOPAEDIC SURGERY

## 2018-06-20 PROCEDURE — 71046 X-RAY EXAM CHEST 2 VIEWS: CPT

## 2018-06-20 PROCEDURE — 93005 ELECTROCARDIOGRAM TRACING: CPT | Performed by: ORTHOPAEDIC SURGERY

## 2018-06-20 RX ORDER — KETOCONAZOLE 20 MG/G
1 CREAM TOPICAL DAILY
COMMUNITY
End: 2019-08-27

## 2018-06-20 RX ORDER — CARBOXYMETHYLCELLULOSE SODIUM 5 MG/ML
2 SOLUTION/ DROPS OPHTHALMIC 3 TIMES DAILY PRN
COMMUNITY
End: 2019-04-08

## 2018-06-20 RX ORDER — BETAMETHASONE DIPROPIONATE 0.05 %
1 OINTMENT (GRAM) TOPICAL 2 TIMES DAILY
COMMUNITY
End: 2019-08-27

## 2018-06-20 RX ORDER — SIMETHICONE 125 MG
125 TABLET,CHEWABLE ORAL EVERY 6 HOURS PRN
COMMUNITY
End: 2018-11-14

## 2018-06-21 PROCEDURE — 93005 ELECTROCARDIOGRAM TRACING: CPT | Performed by: ORTHOPAEDIC SURGERY

## 2018-06-22 RX ORDER — CLONAZEPAM 0.5 MG/1
0.5 TABLET ORAL 2 TIMES DAILY PRN
Qty: 60 TABLET | Refills: 0 | Status: SHIPPED | OUTPATIENT
Start: 2018-06-22 | End: 2018-07-27 | Stop reason: SDUPTHER

## 2018-06-22 NOTE — TELEPHONE ENCOUNTER
Patient has called requesting refill on his clonazepam.      He would like this called into [facility] in [place] on [address].

## 2018-06-22 NOTE — TELEPHONE ENCOUNTER
Will refill one time, but patient needs to establish care with a provider who will be willing to continue prescribing this here at this office for his next refill.

## 2018-06-23 LAB — MRSA SPEC QL CULT: NORMAL

## 2018-06-27 ENCOUNTER — ANESTHESIA EVENT (OUTPATIENT)
Dept: PERIOP | Facility: HOSPITAL | Age: 63
End: 2018-06-27

## 2018-06-27 ENCOUNTER — ANESTHESIA (OUTPATIENT)
Dept: PERIOP | Facility: HOSPITAL | Age: 63
End: 2018-06-27

## 2018-06-27 ENCOUNTER — HOSPITAL ENCOUNTER (OUTPATIENT)
Facility: HOSPITAL | Age: 63
Setting detail: HOSPITAL OUTPATIENT SURGERY
Discharge: HOME | End: 2018-06-27
Attending: ORTHOPAEDIC SURGERY | Admitting: ORTHOPAEDIC SURGERY

## 2018-06-27 VITALS
OXYGEN SATURATION: 97 % | HEART RATE: 52 BPM | BODY MASS INDEX: 37.8 KG/M2 | HEIGHT: 71 IN | WEIGHT: 270 LBS | SYSTOLIC BLOOD PRESSURE: 131 MMHG | DIASTOLIC BLOOD PRESSURE: 77 MMHG | TEMPERATURE: 97.9 F | RESPIRATION RATE: 20 BRPM

## 2018-06-27 DIAGNOSIS — Z01.818 PREOP EXAMINATION: ICD-10-CM

## 2018-06-27 LAB — GLUCOSE BLDC GLUCOMTR-MCNC: 99 MG/DL (ref 70–130)

## 2018-06-27 PROCEDURE — 25010000002 PROPOFOL 1000 MG/ML EMULSION: Performed by: NURSE ANESTHETIST, CERTIFIED REGISTERED

## 2018-06-27 PROCEDURE — 25010000002 FENTANYL CITRATE (PF) 100 MCG/2ML SOLUTION: Performed by: NURSE ANESTHETIST, CERTIFIED REGISTERED

## 2018-06-27 PROCEDURE — 82962 GLUCOSE BLOOD TEST: CPT

## 2018-06-27 PROCEDURE — 29881 ARTHRS KNE SRG MNISECTMY M/L: CPT | Performed by: ORTHOPAEDIC SURGERY

## 2018-06-27 PROCEDURE — 25010000003 CEFAZOLIN PER 500 MG: Performed by: ORTHOPAEDIC SURGERY

## 2018-06-27 PROCEDURE — 25010000002 MIDAZOLAM PER 1 MG: Performed by: NURSE ANESTHETIST, CERTIFIED REGISTERED

## 2018-06-27 RX ORDER — SODIUM CHLORIDE, SODIUM LACTATE, POTASSIUM CHLORIDE, CALCIUM CHLORIDE 600; 310; 30; 20 MG/100ML; MG/100ML; MG/100ML; MG/100ML
1000 INJECTION, SOLUTION INTRAVENOUS CONTINUOUS
Status: DISCONTINUED | OUTPATIENT
Start: 2018-06-27 | End: 2018-06-27 | Stop reason: HOSPADM

## 2018-06-27 RX ORDER — KETAMINE HYDROCHLORIDE 50 MG/ML
INJECTION, SOLUTION, CONCENTRATE INTRAMUSCULAR; INTRAVENOUS AS NEEDED
Status: DISCONTINUED | OUTPATIENT
Start: 2018-06-27 | End: 2018-06-27 | Stop reason: SURG

## 2018-06-27 RX ORDER — SODIUM CHLORIDE 0.9 % (FLUSH) 0.9 %
3 SYRINGE (ML) INJECTION AS NEEDED
Status: DISCONTINUED | OUTPATIENT
Start: 2018-06-27 | End: 2018-06-27 | Stop reason: HOSPADM

## 2018-06-27 RX ORDER — MAGNESIUM HYDROXIDE 1200 MG/15ML
LIQUID ORAL AS NEEDED
Status: DISCONTINUED | OUTPATIENT
Start: 2018-06-27 | End: 2018-06-27 | Stop reason: HOSPADM

## 2018-06-27 RX ORDER — BUPIVACAINE HYDROCHLORIDE AND EPINEPHRINE 5; 5 MG/ML; UG/ML
INJECTION, SOLUTION EPIDURAL; INTRACAUDAL; PERINEURAL AS NEEDED
Status: DISCONTINUED | OUTPATIENT
Start: 2018-06-27 | End: 2018-06-27 | Stop reason: HOSPADM

## 2018-06-27 RX ORDER — ONDANSETRON 2 MG/ML
4 INJECTION INTRAMUSCULAR; INTRAVENOUS ONCE AS NEEDED
Status: DISCONTINUED | OUTPATIENT
Start: 2018-06-27 | End: 2018-06-27 | Stop reason: HOSPADM

## 2018-06-27 RX ORDER — ONDANSETRON 4 MG/1
4 TABLET, FILM COATED ORAL ONCE AS NEEDED
Status: DISCONTINUED | OUTPATIENT
Start: 2018-06-27 | End: 2018-06-27 | Stop reason: HOSPADM

## 2018-06-27 RX ORDER — OXYCODONE HYDROCHLORIDE AND ACETAMINOPHEN 5; 325 MG/1; MG/1
1 TABLET ORAL EVERY 6 HOURS PRN
Status: DISCONTINUED | OUTPATIENT
Start: 2018-06-27 | End: 2018-06-27 | Stop reason: HOSPADM

## 2018-06-27 RX ORDER — MIDAZOLAM HYDROCHLORIDE 1 MG/ML
INJECTION INTRAMUSCULAR; INTRAVENOUS AS NEEDED
Status: DISCONTINUED | OUTPATIENT
Start: 2018-06-27 | End: 2018-06-27 | Stop reason: SURG

## 2018-06-27 RX ORDER — LIDOCAINE HYDROCHLORIDE AND EPINEPHRINE 10; 10 MG/ML; UG/ML
INJECTION, SOLUTION INFILTRATION; PERINEURAL AS NEEDED
Status: DISCONTINUED | OUTPATIENT
Start: 2018-06-27 | End: 2018-06-27 | Stop reason: HOSPADM

## 2018-06-27 RX ORDER — FENTANYL CITRATE 50 UG/ML
INJECTION, SOLUTION INTRAMUSCULAR; INTRAVENOUS AS NEEDED
Status: DISCONTINUED | OUTPATIENT
Start: 2018-06-27 | End: 2018-06-27 | Stop reason: SURG

## 2018-06-27 RX ORDER — HYDROCODONE BITARTRATE AND ACETAMINOPHEN 5; 325 MG/1; MG/1
1-2 TABLET ORAL EVERY 6 HOURS PRN
Qty: 20 TABLET | Refills: 0 | Status: SHIPPED | OUTPATIENT
Start: 2018-06-27 | End: 2018-10-24

## 2018-06-27 RX ADMIN — CEFAZOLIN SODIUM 2 G: 2 SOLUTION INTRAVENOUS at 08:57

## 2018-06-27 RX ADMIN — KETAMINE HYDROCHLORIDE 25 MG: 50 INJECTION, SOLUTION INTRAMUSCULAR; INTRAVENOUS at 08:52

## 2018-06-27 RX ADMIN — PROPOFOL 100 MCG/KG/MIN: 10 INJECTION, EMULSION INTRAVENOUS at 08:52

## 2018-06-27 RX ADMIN — SODIUM CHLORIDE, POTASSIUM CHLORIDE, SODIUM LACTATE AND CALCIUM CHLORIDE 1000 ML: 600; 310; 30; 20 INJECTION, SOLUTION INTRAVENOUS at 08:15

## 2018-06-27 RX ADMIN — MIDAZOLAM HYDROCHLORIDE 2 MG: 1 INJECTION, SOLUTION INTRAMUSCULAR; INTRAVENOUS at 08:52

## 2018-06-27 RX ADMIN — FENTANYL CITRATE 100 MCG: 50 INJECTION, SOLUTION INTRAMUSCULAR; INTRAVENOUS at 08:52

## 2018-06-27 NOTE — ANESTHESIA POSTPROCEDURE EVALUATION
Patient: Gonzalo Oates    Procedure Summary     Date:  06/27/18 Room / Location:  [facility] OR  /  [facility]    Anesthesia Start:  0858 Anesthesia Stop:  0927    Procedure:  diagnostic arthroscopy partial medial meniscectomy or other procedures as necessary, left knee (Left Knee) Diagnosis:       Preop examination      (Preop examination [Z01.818])    Surgeon:  Tj Hoffman MD Provider:  Naveen Lopez CRNA    Anesthesia Type:  MAC, general ASA Status:  3          Anesthesia Type: MAC, general  Last vitals  BP   131/77 (06/27/18 1110)   Temp   97.9 °F (36.6 °C) (06/27/18 1005)   Pulse   52 (06/27/18 1110)   Resp   20 (06/27/18 1110)     SpO2   97 % (06/27/18 1110)     Anesthesia Post Evaluation

## 2018-06-27 NOTE — ANESTHESIA PREPROCEDURE EVALUATION
Anesthesia Evaluation     Patient summary reviewed and Nursing notes reviewed   NPO Solid Status: > 8 hours  NPO Liquid Status: > 8 hours           Airway   Mallampati: II  TM distance: >3 FB  Neck ROM: full  No difficulty expected  Dental      Pulmonary    (+) shortness of breath, sleep apnea on CPAP,   Cardiovascular   Exercise tolerance: good (4-7 METS)    Rate: normal    (+) hypertension, PVD, DVT, hyperlipidemia,       Neuro/Psych  (+) headaches, psychiatric history Anxiety and Depression,     GI/Hepatic/Renal/Endo    (+) obesity,  GERD well controlled,  diabetes mellitus,     Musculoskeletal     Abdominal    Substance History      OB/GYN          Other   (+) arthritis                     Anesthesia Plan    ASA 3     MAC and general     intravenous induction   Anesthetic plan and risks discussed with patient.    Plan discussed with CRNA.

## 2018-06-27 NOTE — ANESTHESIA POSTPROCEDURE EVALUATION
Patient: Gonzalo Oates    Procedure Summary     Date:  06/27/18 Room / Location:  [facility] OR  /  [facility]    Anesthesia Start:  0858 Anesthesia Stop:  0927    Procedure:  diagnostic arthroscopy partial medial meniscectomy or other procedures as necessary, left knee (Left Knee) Diagnosis:       Preop examination      (Preop examination [Z01.818])    Surgeon:  Tj Hoffman MD Provider:  Naveen Lopez CRNA    Anesthesia Type:  MAC, general ASA Status:  3          Anesthesia Type: MAC, general  Last vitals  BP   115/73 (06/27/18 0749)   Temp   97.6 °F (36.4 °C) (06/27/18 0749)   Pulse   75 (06/27/18 0749)   Resp   18 (06/27/18 0749)     SpO2   100 % (06/27/18 0749)     Post Anesthesia Care and Evaluation    Patient location during evaluation: bedside  Patient participation: complete - patient participated  Level of consciousness: awake and alert  Pain score: 0  Pain management: adequate  Airway patency: patent  Anesthetic complications: No anesthetic complications  PONV Status: none  Cardiovascular status: acceptable  Respiratory status: acceptable  Hydration status: acceptable

## 2018-07-09 ENCOUNTER — OFFICE VISIT (OUTPATIENT)
Dept: ORTHOPEDIC SURGERY | Facility: CLINIC | Age: 63
End: 2018-07-09

## 2018-07-09 VITALS — HEIGHT: 71 IN | RESPIRATION RATE: 18 BRPM | WEIGHT: 270 LBS | BODY MASS INDEX: 37.8 KG/M2

## 2018-07-09 DIAGNOSIS — S83.232D COMPLEX TEAR OF MEDIAL MENISCUS OF LEFT KNEE AS CURRENT INJURY, SUBSEQUENT ENCOUNTER: Primary | ICD-10-CM

## 2018-07-09 PROCEDURE — 99024 POSTOP FOLLOW-UP VISIT: CPT | Performed by: ORTHOPAEDIC SURGERY

## 2018-07-09 NOTE — PROGRESS NOTES
Subjective   Patient ID: Gonzalo Oates is a 63 y.o. male  Post-op and Pain of the Left Knee (Patient is here today for first post op and suture removal, he states he has minor pain but overall he is doing good.)             History of Present Illness    Has been able to resume his normal gardening and yard work working on a pump station repair, no drainage from incision here for suture removal today after partial medial meniscectomy.    Review of Systems   Constitutional: Negative for fever.   HENT: Negative for voice change.    Eyes: Negative for visual disturbance.   Respiratory: Negative for shortness of breath.    Cardiovascular: Negative for chest pain.   Gastrointestinal: Negative for abdominal distention and abdominal pain.   Genitourinary: Negative for dysuria.   Musculoskeletal: Positive for arthralgias. Negative for gait problem and joint swelling.   Skin: Negative for rash.   Neurological: Negative for speech difficulty.   Hematological: Does not bruise/bleed easily.   Psychiatric/Behavioral: Negative for confusion.       Past Medical History:   Diagnosis Date   • Ankle fracture     left 02/2015   • Anxiety    • Arthritis    • BPH (benign prostatic hyperplasia)    • Cataract    • Chronic back pain    • Chronic sinusitis    • Colon polyps    • Constipation     OPIOD INDUCED   • Deep vein thrombosis (DVT) of iliac vein (CMS/HCC)    • Depression    • Diabetes mellitus (CMS/HCC)     PRE DIABETES NO MEDS   • Erectile dysfunction    • GERD (gastroesophageal reflux disease)    • Herniated disc, cervical    • Hyperlipidemia    • Hypertension    • IBS (irritable bowel syndrome)    • Left knee pain    • Lumbar herniated disc    • Migraine headache    • Narcolepsy    • Obesity    • KO (obstructive sleep apnea)     non compliant with CPAP    • Prediabetes    • Restless leg     supressed with narcotics    • Sleep apnea    • Snoring    • SOB (shortness of breath)    • Umbilical hernia    • UTI (urinary tract  infection)    • Varicose veins    • Venous insufficiency    • Wears glasses         Past Surgical History:   Procedure Laterality Date   • APPENDECTOMY     • BACK SURGERY     • BACK SURGERY     • FRACTURE SURGERY      LEFT ANKLE   • KNEE ARTHROSCOPY Left 6/27/2018    Procedure: diagnostic arthroscopy partial medial meniscectomy or other procedures as necessary, left knee;  Surgeon: Tj Hoffman MD;  Location: [facility];  Service: Orthopedics   • LAMINECTOMY     • SINUS SURGERY     • TONSILLECTOMY     • ULNAR NERVE TRANSPOSITION     • VASECTOMY     • WISDOM TOOTH EXTRACTION         Family History   Problem Relation Age of Onset   • Cancer Other    • Diabetes Other    • Heart disease Other    • Hypertension Other    • Endocrine tumor Mother    • Anxiety disorder Mother    • Arthritis Mother    • Hypertension Mother    • Heart attack Father    • Hypertension Father    • Glaucoma Father    • Cancer Maternal Grandmother        Social History     Social History   • Marital status:      Spouse name: N/A   • Number of children: N/A   • Years of education: N/A     Occupational History   • Not on file.     Social History Main Topics   • Smoking status: Never Smoker   • Smokeless tobacco: Never Used   • Alcohol use Yes      Comment: 3 shots of liquor a week   • Drug use: No   • Sexual activity: Defer     Other Topics Concern   • Not on file     Social History Narrative   • No narrative on file       I have reviewed all of the above social hx, family hx, surgical hx, medications, allergies & ROS and confirm that it is accurate.    Allergies   Allergen Reactions   • Calcium Channel Blockers Swelling     Lower extremity edema       • Adhesive Tape Rash   • Nickel Swelling   • Zinc Oxide Rash and Unknown (See Comments)     SENSITIVITY ,CAUSES RASH           Current Outpatient Prescriptions:   •  acetaminophen (TYLENOL) 500 MG tablet, Take 500 mg by mouth 2 (Two) Times a Day., Disp: , Rfl:   •  aspirin 81 MG tablet,  aspirin 81 mg tablet  Take by oral route., Disp: , Rfl:   •  betamethasone dipropionate (DIPROLENE) 0.05 % ointment, Apply 1 application topically 2 (Two) Times a Day., Disp: , Rfl:   •  butalbital-acetaminophen-caffeine (FIORICET) -40 MG capsule capsule, Take 1 capsule by mouth as needed., Disp: , Rfl:   •  calcium carbonate (OS-SO) 600 MG tablet, Take 500 mg by mouth Daily., Disp: , Rfl:   •  carboxymethylcellulose (REFRESH PLUS) 0.5 % solution, 2 drops 3 (Three) Times a Day As Needed for Dry Eyes., Disp: , Rfl:   •  chlorthalidone (HYGROTON) 25 MG tablet, Take 25 mg by mouth., Disp: , Rfl:   •  Cholecalciferol (VITAMIN D3) 5000 units capsule capsule, Take 5,000 Units by mouth Daily., Disp: , Rfl:   •  clonazePAM (KlonoPIN) 0.5 MG tablet, Take 1 tablet by mouth 2 (Two) Times a Day As Needed for Anxiety., Disp: 60 tablet, Rfl: 0  •  cyclobenzaprine (FLEXERIL) 10 MG tablet, Every 8 (Eight) Hours., Disp: , Rfl:   •  DULoxetine (CYMBALTA) 60 MG capsule, Take 60 mg by mouth Daily., Disp: , Rfl:   •  fluocinonide (LIDEX) 0.05 % ointment, APPLY ONE APPLICATION TO THE AFFECTED AREA ONCE DAILY AS needed (not for face or genitals), Disp: , Rfl:   •  gabapentin (NEURONTIN) 800 MG tablet, Take 800 mg by mouth 3 (Three) Times a Day., Disp: , Rfl:   •  glucose blood test strip, Test qd, Disp: 100 each, Rfl: 1  •  glucose monitor monitoring kit, As directed, Disp: 1 each, Rfl: 0  •  HYDROcodone-acetaminophen (NORCO) 5-325 MG per tablet, Take 1-2 tablets by mouth Every 6 (Six) Hours As Needed (Pain)., Disp: 20 tablet, Rfl: 0  •  ketoconazole (NIZORAL) 2 % cream, Apply 1 application topically Daily., Disp: , Rfl:   •  Lancets misc, Test qd, Disp: 100 each, Rfl: 1  •  lisinopril (PRINIVIL,ZESTRIL) 20 MG tablet, lisinopril 20 mg tablet, Disp: , Rfl:   •  Loperamide-Simethicone (IMODIUM ADVANCED PO), Take 1 tablet by mouth Daily As Needed., Disp: , Rfl:   •  nystatin-triamcinolone (MYCOLOG II) cream, Apply  topically 3 (three)  "times a day., Disp: , Rfl:   •  Omega-3 Fatty Acids (FISH OIL) 1000 MG capsule capsule, Take  by mouth 2 (Two) Times a Day With Meals., Disp: , Rfl:   •  omeprazole (priLOSEC) 40 MG capsule, Take 40 mg by mouth Daily., Disp: , Rfl:   •  oxyCODONE-acetaminophen (PERCOCET) 7.5-325 MG per tablet, Take 1 tablet by mouth 2 (Two) Times a Day., Disp: , Rfl:   •  pramipexole (MIRAPEX) 1 MG tablet, Take 1 mg by mouth 2 (Two) Times a Day., Disp: , Rfl:   •  pravastatin (PRAVACHOL) 40 MG tablet, Take 40 mg by mouth Daily., Disp: , Rfl:   •  rotigotine (NEUPRO) 4 MG/24HR patch, Place 1 patch on the skin every night at bedtime., Disp: , Rfl:   •  simethicone (MYLICON) 125 MG chewable tablet, Chew 125 mg Every 6 (Six) Hours As Needed for Flatulence., Disp: , Rfl:   •  Sodium Fluoride (PREVIDENT 5000 BOOSTER PLUS) 1.1 % paste, Apply 1 application to teeth 2 (Two) Times a Day., Disp: , Rfl:   •  spironolactone (ALDACTONE) 25 MG tablet, Take 25 mg by mouth Daily., Disp: , Rfl:   •  SUMAtriptan (IMITREX) 50 MG tablet, Take 50 mg by mouth., Disp: , Rfl:   •  tolnaftate (TINACTIN) 1 % external solution, Apply 1 application topically Every Night., Disp: , Rfl:     Objective   Resp 18   Ht 180.3 cm (71\")   Wt 122 kg (270 lb)   BMI 37.66 kg/m²    Physical Exam  Constitutional: Patient is oriented to person, place, and time. Patient appears well-developed and well-nourished.   HENT:Head: Normocephalic and atraumatic.   Eyes: EOM are normal. Pupils are equal, round, and reactive to light.   Neck: Normal range of motion. Neck supple.   Cardiovascular: Normal rate.    Pulmonary/Chest: Effort normal and breath sounds normal.   Abdominal: Soft.   Neurological: Patient is alert and oriented to person, place, and time.   Skin: Skin is warm and dry.   Psychiatric: Patient has a normal mood and affect.   Nursing note and vitals reviewed.       Ortho Exam   Left knee incisions well-healed sutures removed today lower leg without edema minimal " erythema around social areas no effusion full knee extension good stability    Assessment/Plan   Review of Radiographic Studies:    No new imaging done today.      Procedures     Gonzalo was seen today for post-op and pain.    Diagnoses and all orders for this visit:    Complex tear of medial meniscus of left knee as current injury, subsequent encounter       Orthopedic activities reviewed and patient expressed appreciation      Recommendations/Plan:   Work/Activity Status: May perform usual activities as tolerated    Patient agreeable to call or return sooner for any concerns.             Impression:  Status post left knee diagnostic arthroscopy partial medial meniscectomy  Plan:  Wound care structures given progressive increase in activities as tolerated, return when necessary

## 2018-07-13 RX ORDER — PRAMIPEXOLE DIHYDROCHLORIDE 1 MG/1
1 TABLET ORAL 2 TIMES DAILY
Qty: 180 TABLET | Refills: 0 | Status: SHIPPED | OUTPATIENT
Start: 2018-07-13 | End: 2018-07-16 | Stop reason: SDUPTHER

## 2018-07-16 RX ORDER — PRAMIPEXOLE DIHYDROCHLORIDE 1 MG/1
1 TABLET ORAL 2 TIMES DAILY
Qty: 180 TABLET | Refills: 0 | Status: SHIPPED | OUTPATIENT
Start: 2018-07-16 | End: 2018-09-14 | Stop reason: SDUPTHER

## 2018-07-16 RX ORDER — OMEPRAZOLE 40 MG/1
40 CAPSULE, DELAYED RELEASE ORAL DAILY
Qty: 90 CAPSULE | Refills: 0 | Status: SHIPPED | OUTPATIENT
Start: 2018-07-16 | End: 2018-10-08 | Stop reason: SDUPTHER

## 2018-07-16 RX ORDER — SPIRONOLACTONE 25 MG/1
25 TABLET ORAL DAILY
Qty: 90 TABLET | Refills: 0 | Status: SHIPPED | OUTPATIENT
Start: 2018-07-16 | End: 2018-09-10 | Stop reason: SDUPTHER

## 2018-07-27 RX ORDER — CLONAZEPAM 0.5 MG/1
TABLET ORAL
Qty: 60 TABLET | Refills: 0 | Status: SHIPPED | OUTPATIENT
Start: 2018-07-27 | End: 2018-08-26 | Stop reason: SDUPTHER

## 2018-08-16 ENCOUNTER — TELEPHONE (OUTPATIENT)
Dept: INTERNAL MEDICINE | Facility: CLINIC | Age: 63
End: 2018-08-16

## 2018-08-16 DIAGNOSIS — G47.30 SLEEP APNEA, UNSPECIFIED TYPE: Primary | ICD-10-CM

## 2018-08-16 NOTE — TELEPHONE ENCOUNTER
Patient called and left a voicemail saying he is wanting to get a referral to Dr. Chan for sleep. He said he saw Dr. Fuentes but he has left. He said he has had 3 sleep studies done and is on a CPAP. He is needing a referral to get set up with Dr. Chan to get taken care of.

## 2018-08-17 NOTE — TELEPHONE ENCOUNTER
He stated that he received a letter from Medicare re his Neupro. He pays $100 for it himself. They suggested that he use ropironole or Mirapex as those are more cost effective. He does already take Mirapex. He asked if there is anything you can do to help. He is aware of the referral though.

## 2018-08-27 RX ORDER — CLONAZEPAM 0.5 MG/1
TABLET ORAL
Qty: 60 TABLET | Refills: 0 | Status: SHIPPED | OUTPATIENT
Start: 2018-08-27 | End: 2018-10-03 | Stop reason: SDUPTHER

## 2018-08-31 ENCOUNTER — TELEPHONE (OUTPATIENT)
Dept: INTERNAL MEDICINE | Facility: CLINIC | Age: 63
End: 2018-08-31

## 2018-09-09 DIAGNOSIS — Z11.59 NEED FOR HEPATITIS C SCREENING TEST: Primary | ICD-10-CM

## 2018-09-10 RX ORDER — DULOXETIN HYDROCHLORIDE 60 MG/1
60 CAPSULE, DELAYED RELEASE ORAL DAILY
Qty: 90 CAPSULE | Refills: 1 | Status: SHIPPED | OUTPATIENT
Start: 2018-09-10 | End: 2019-08-27

## 2018-09-10 RX ORDER — SPIRONOLACTONE 25 MG/1
25 TABLET ORAL DAILY
Qty: 90 TABLET | Refills: 1 | Status: SHIPPED | OUTPATIENT
Start: 2018-09-10 | End: 2018-12-18 | Stop reason: SDUPTHER

## 2018-09-11 ENCOUNTER — OFFICE VISIT (OUTPATIENT)
Dept: ORTHOPEDIC SURGERY | Facility: CLINIC | Age: 63
End: 2018-09-11

## 2018-09-11 VITALS — HEIGHT: 71 IN | WEIGHT: 264 LBS | BODY MASS INDEX: 36.96 KG/M2 | RESPIRATION RATE: 18 BRPM

## 2018-09-11 DIAGNOSIS — M25.552 PAIN OF LEFT HIP JOINT: ICD-10-CM

## 2018-09-11 DIAGNOSIS — S83.232D COMPLEX TEAR OF MEDIAL MENISCUS OF LEFT KNEE AS CURRENT INJURY, SUBSEQUENT ENCOUNTER: Primary | ICD-10-CM

## 2018-09-11 PROCEDURE — 99214 OFFICE O/P EST MOD 30 MIN: CPT | Performed by: ORTHOPAEDIC SURGERY

## 2018-09-11 PROCEDURE — 99024 POSTOP FOLLOW-UP VISIT: CPT | Performed by: ORTHOPAEDIC SURGERY

## 2018-09-11 RX ORDER — VITAMINS A AND D
CAPSULE ORAL
COMMUNITY
Start: 2018-07-12 | End: 2018-10-24

## 2018-09-11 RX ORDER — TRIAMCINOLONE ACETONIDE 1 MG/G
CREAM TOPICAL
COMMUNITY
End: 2018-10-24

## 2018-09-11 RX ORDER — GLUCOSAM/CHON-MSM1/C/MANG/BOSW 500-416.6
TABLET ORAL
COMMUNITY
End: 2018-10-03 | Stop reason: SDUPTHER

## 2018-09-11 RX ORDER — VITAMINS A AND D
CAPSULE ORAL
COMMUNITY
End: 2018-10-24

## 2018-09-11 NOTE — PROGRESS NOTES
Subjective   Patient ID: Gonzalo Oates is a 63 y.o. male  Pain and Post-op of the Left Knee (Patient states his knee was doing better and now his knee is very stiff, the knee will not bend like it did. He denies any pain and states he has broke the 2 screws in his back.)             History of Present Illness     63-year-old status post partial medial meniscectomy in June of this year left knee has had several falls one of which injured his lower back necessitating schedule for removal of hardware and revision instrumentation fusion to be done in [place].  He complains of left knee stiffness very little swelling pain is overall improved since his surgery denies new onset swelling in the left ankle, he says his ankle like a been chronically swollen since a fracture many years ago.  He says his hip feels a little stiff when he moves but has no pain with weightbearing on the left leg.  His lower back pain on the left side radiates down the left leg elevated ankle foot associated with paresthesias he does not have  overt weakness in his left  foot or ankle.  Prior to his initial lumbar surgery was complaining more of right-sided radicular leg pain and has had residual paresthesias in the foot on the right side but no new onset weakness in the right leg since his recent falls.  X-rays in the past of his knee showing very little osteoarthritic change and his arthroscopic surgery successfully relieved his medial knee pain in June of the this year.     Review of Systems   Constitutional: Negative for fever.   HENT: Negative for voice change.    Eyes: Negative for visual disturbance.   Respiratory: Negative for shortness of breath.    Cardiovascular: Negative for chest pain.   Gastrointestinal: Negative for abdominal distention and abdominal pain.   Genitourinary: Negative for dysuria.   Musculoskeletal: Positive for arthralgias. Negative for gait problem and joint swelling.   Skin: Negative for rash.   Neurological:  Negative for speech difficulty.   Hematological: Does not bruise/bleed easily.   Psychiatric/Behavioral: Negative for confusion.       Past Medical History:   Diagnosis Date   • Ankle fracture     left 02/2015   • Anxiety    • Arthritis    • BPH (benign prostatic hyperplasia)    • Cataract    • Chronic back pain    • Chronic sinusitis    • Colon polyps    • Constipation     OPIOD INDUCED   • Deep vein thrombosis (DVT) of iliac vein (CMS/HCC)    • Depression    • Diabetes mellitus (CMS/HCC)     PRE DIABETES NO MEDS   • Erectile dysfunction    • GERD (gastroesophageal reflux disease)    • Herniated disc, cervical    • Hyperlipidemia    • Hypertension    • IBS (irritable bowel syndrome)    • Left knee pain    • Lumbar herniated disc    • Migraine headache    • Narcolepsy    • Obesity    • KO (obstructive sleep apnea)     non compliant with CPAP    • Prediabetes    • Restless leg     supressed with narcotics    • Sleep apnea    • Snoring    • SOB (shortness of breath)    • Umbilical hernia    • UTI (urinary tract infection)    • Varicose veins    • Venous insufficiency    • Wears glasses         Past Surgical History:   Procedure Laterality Date   • APPENDECTOMY     • BACK SURGERY     • BACK SURGERY     • FRACTURE SURGERY      LEFT ANKLE   • KNEE ARTHROSCOPY Left 6/27/2018    Procedure: diagnostic arthroscopy partial medial meniscectomy or other procedures as necessary, left knee;  Surgeon: Tj Hoffman MD;  Location: [facility];  Service: Orthopedics   • LAMINECTOMY     • SINUS SURGERY     • TONSILLECTOMY     • ULNAR NERVE TRANSPOSITION     • VASECTOMY     • WISDOM TOOTH EXTRACTION         Family History   Problem Relation Age of Onset   • Cancer Other    • Diabetes Other    • Heart disease Other    • Hypertension Other    • Endocrine tumor Mother    • Anxiety disorder Mother    • Arthritis Mother    • Hypertension Mother    • Heart attack Father    • Hypertension Father    • Glaucoma Father    • Cancer Maternal  Grandmother        Social History     Social History   • Marital status:      Spouse name: N/A   • Number of children: N/A   • Years of education: N/A     Occupational History   • Not on file.     Social History Main Topics   • Smoking status: Never Smoker   • Smokeless tobacco: Never Used   • Alcohol use Yes      Comment: 3 shots of liquor a week   • Drug use: No   • Sexual activity: Defer     Other Topics Concern   • Not on file     Social History Narrative   • No narrative on file       I have reviewed all of the above social hx, family hx, surgical hx, medications, allergies & ROS and confirm that it is accurate.    Allergies   Allergen Reactions   • Calcium Channel Blockers Swelling     Lower extremity edema       • Adhesive Tape Rash   • Nickel Swelling   • Zinc Oxide Rash and Unknown (See Comments)     SENSITIVITY ,CAUSES RASH           Current Outpatient Prescriptions:   •  acetaminophen (TYLENOL) 500 MG tablet, Take 500 mg by mouth 2 (Two) Times a Day., Disp: , Rfl:   •  aspirin 81 MG tablet, aspirin 81 mg tablet  Take by oral route., Disp: , Rfl:   •  betamethasone dipropionate (DIPROLENE) 0.05 % ointment, Apply 1 application topically 2 (Two) Times a Day., Disp: , Rfl:   •  butalbital-acetaminophen-caffeine (FIORICET) -40 MG capsule capsule, Take 1 capsule by mouth as needed., Disp: , Rfl:   •  calcium carbonate (OS-SO) 600 MG tablet, Take 500 mg by mouth Daily., Disp: , Rfl:   •  carboxymethylcellulose (REFRESH PLUS) 0.5 % solution, 2 drops 3 (Three) Times a Day As Needed for Dry Eyes., Disp: , Rfl:   •  chlorthalidone (HYGROTON) 25 MG tablet, Take 25 mg by mouth., Disp: , Rfl:   •  Cholecalciferol (VITAMIN D3) 5000 units capsule capsule, Take 5,000 Units by mouth Daily., Disp: , Rfl:   •  clonazePAM (KlonoPIN) 0.5 MG tablet, TAKE 1 TABLET BY MOUTH TWO TIMES A DAY AS NEEDED FOR ANXIETY, Disp: 60 tablet, Rfl: 0  •  cyclobenzaprine (FLEXERIL) 10 MG tablet, Every 8 (Eight) Hours., Disp: , Rfl:    •  Dextromethorphan HBr (COUGH SUPPRESSANT PO), , Disp: , Rfl:   •  DULoxetine (CYMBALTA) 60 MG capsule, Take 1 capsule by mouth Daily., Disp: 90 capsule, Rfl: 1  •  fluocinonide (LIDEX) 0.05 % ointment, APPLY ONE APPLICATION TO THE AFFECTED AREA ONCE DAILY AS needed (not for face or genitals), Disp: , Rfl:   •  gabapentin (NEURONTIN) 800 MG tablet, Take 800 mg by mouth 3 (Three) Times a Day., Disp: , Rfl:   •  glucose blood test strip, Test qd, Disp: 100 each, Rfl: 1  •  glucose blood test strip, True Metrix Glucose Test Strip, Disp: , Rfl:   •  glucose monitor monitoring kit, As directed, Disp: 1 each, Rfl: 0  •  HYDROcodone-acetaminophen (NORCO) 5-325 MG per tablet, Take 1-2 tablets by mouth Every 6 (Six) Hours As Needed (Pain)., Disp: 20 tablet, Rfl: 0  •  ketoconazole (NIZORAL) 2 % cream, Apply 1 application topically Daily., Disp: , Rfl:   •  Lancets misc, Test qd, Disp: 100 each, Rfl: 1  •  lisinopril (PRINIVIL,ZESTRIL) 20 MG tablet, lisinopril 20 mg tablet, Disp: , Rfl:   •  Loperamide-Simethicone (IMODIUM ADVANCED PO), Take 1 tablet by mouth Daily As Needed., Disp: , Rfl:   •  nystatin-triamcinolone (MYCOLOG II) cream, Apply  topically 3 (three) times a day., Disp: , Rfl:   •  Omega-3 Fatty Acids (FISH OIL) 1000 MG capsule capsule, Take  by mouth 2 (Two) Times a Day With Meals., Disp: , Rfl:   •  omeprazole (priLOSEC) 40 MG capsule, Take 1 capsule by mouth Daily., Disp: 90 capsule, Rfl: 0  •  oxyCODONE-acetaminophen (PERCOCET) 7.5-325 MG per tablet, Take 1 tablet by mouth 2 (Two) Times a Day., Disp: , Rfl:   •  pramipexole (MIRAPEX) 1 MG tablet, Take 1 tablet by mouth 2 (Two) Times a Day., Disp: 180 tablet, Rfl: 0  •  pravastatin (PRAVACHOL) 40 MG tablet, Take 40 mg by mouth Daily., Disp: , Rfl:   •  rotigotine (NEUPRO) 4 MG/24HR patch, Place 1 patch on the skin every night at bedtime., Disp: , Rfl:   •  SENNA LAXATIVE 25 MG tablet, , Disp: , Rfl:   •  simethicone (MYLICON) 125 MG chewable tablet, Chew  "125 mg Every 6 (Six) Hours As Needed for Flatulence., Disp: , Rfl:   •  Sodium Fluoride (PREVIDENT 5000 BOOSTER PLUS) 1.1 % paste, Apply 1 application to teeth 2 (Two) Times a Day., Disp: , Rfl:   •  spironolactone (ALDACTONE) 25 MG tablet, Take 1 tablet by mouth Daily., Disp: 90 tablet, Rfl: 1  •  SUMAtriptan (IMITREX) 50 MG tablet, Take 50 mg by mouth., Disp: , Rfl:   •  tolnaftate (TINACTIN) 1 % external solution, Apply 1 application topically Every Night., Disp: , Rfl:   •  triamcinolone (KENALOG) 0.1 % cream, , Disp: , Rfl:   •  triamcinolone (KENALOG) 0.1 % ointment, triamcinolone acetonide 0.1 % topical ointment, Disp: , Rfl:   •  triamcinolone (KENALOG) 0.1 % ointment, Apply twice daily topically as needed, Disp: , Rfl: 0  •  TRUEPLUS LANCETS 28G misc, TRUEplus Lancets 28 gauge, Disp: , Rfl:   •  Vitamins A & D 5000-400 units capsule, vitamin d 5000 iu, Disp: , Rfl:   •  Vitamins A & D 5000-400 units capsule, , Disp: , Rfl:   •  Zinc Oxide 10 % ointment, , Disp: , Rfl:     Objective   Resp 18   Ht 180.3 cm (71\")   Wt 120 kg (264 lb)   BMI 36.82 kg/m²    Physical Exam  Constitutional: Patient is oriented to person, place, and time. Patient appears well-developed and well-nourished.   HENT:Head: Normocephalic and atraumatic.   Eyes: EOM are normal. Pupils are equal, round, and reactive to light.   Neck: Normal range of motion. Neck supple.   Cardiovascular: Normal rate.    Pulmonary/Chest: Effort normal and breath sounds normal.   Abdominal: Soft.   Neurological: Patient is alert and oriented to person, place, and time.   Skin: Skin is warm and dry.   Psychiatric: Patient has a normal mood and affect.   Nursing note and vitals reviewed.       Ortho Exam   Left lower lumbosacral pain worsen the right side, negative Trendelenburg gait, positive straight leg raising on the left reproduction of lateral lower leg and foot pain, gross motor control intact ankle-foot, left knee with no ecchymosis abrasions " contusions very slight effusion psoriatic skin changes anteriorly pain with flexion extension Gale sign negative ligament exam unremarkable extensor mechanism intact.  Anastasia sign negative at the calf    Assessment/Plan   Review of Radiographic Studies:    Radiographic images today of affected area I personally viewed and showed no sign of acute fracture or dislocation.      Procedures     Gonzalo was seen today for pain and post-op.    Diagnoses and all orders for this visit:    Complex tear of medial meniscus of left knee as current injury, subsequent encounter  -     XR Knee 1 or 2 View Left  -     XR hip w or wo pelvis 2-3 view left; Future    Pain of left hip joint  -     XR hip w or wo pelvis 2-3 view left; Future       Orthopedic activities reviewed and patient expressed appreciation      Recommendations/Plan:   Work/Activity Status: May perform usual activities as tolerated    Patient agreeable to call or return sooner for any concerns.             Impression:  Status post left knee partial medial meniscectomy, left lower leg lumbar radiculopathy secondary to broken sacral screws with retained hardware scheduled for revision lumbar decompression instrumentation fusion and removal  Plan:  Plan to have him continue weight-bear as tolerated use of cane for assistance balance follow-up at the lumbar spine specialist and removal for revision lumbar surgery return to see me as needed.  No further orthopedic care recommended at this time for the hip or knee on the left side

## 2018-09-13 LAB — HCV AB S/CO SERPL IA: <0.1 S/CO RATIO (ref 0–0.9)

## 2018-09-17 RX ORDER — PRAMIPEXOLE DIHYDROCHLORIDE 1 MG/1
TABLET ORAL
Qty: 180 TABLET | Refills: 0 | Status: SHIPPED | OUTPATIENT
Start: 2018-09-17 | End: 2018-10-08 | Stop reason: SDUPTHER

## 2018-09-24 ENCOUNTER — TELEPHONE (OUTPATIENT)
Dept: INTERNAL MEDICINE | Facility: CLINIC | Age: 63
End: 2018-09-24

## 2018-09-24 RX ORDER — GABAPENTIN 800 MG/1
800 TABLET ORAL 3 TIMES DAILY
Qty: 90 TABLET | Refills: 0 | Status: SHIPPED | OUTPATIENT
Start: 2018-09-24 | End: 2018-10-03 | Stop reason: SDUPTHER

## 2018-10-03 RX ORDER — CLONAZEPAM 0.5 MG/1
0.5 TABLET ORAL 2 TIMES DAILY PRN
Qty: 60 TABLET | Refills: 0 | Status: SHIPPED | OUTPATIENT
Start: 2018-10-03 | End: 2018-11-02

## 2018-10-04 RX ORDER — CALCIUM CITRATE/VITAMIN D3 200MG-6.25
TABLET ORAL
Qty: 100 EACH | Refills: 1 | Status: SHIPPED | OUTPATIENT
Start: 2018-10-04 | End: 2019-08-27

## 2018-10-04 RX ORDER — GLUCOSAM/CHON-MSM1/C/MANG/BOSW 500-416.6
TABLET ORAL
Qty: 100 EACH | Refills: 1 | Status: SHIPPED | OUTPATIENT
Start: 2018-10-04 | End: 2019-08-27

## 2018-10-04 RX ORDER — GABAPENTIN 800 MG/1
TABLET ORAL
Qty: 90 TABLET | Refills: 0 | Status: SHIPPED | OUTPATIENT
Start: 2018-10-04 | End: 2019-07-26 | Stop reason: SDUPTHER

## 2018-10-05 ENCOUNTER — TELEPHONE (OUTPATIENT)
Dept: INTERNAL MEDICINE | Facility: CLINIC | Age: 63
End: 2018-10-05

## 2018-10-08 RX ORDER — PRAMIPEXOLE DIHYDROCHLORIDE 1 MG/1
TABLET ORAL
Qty: 180 TABLET | Refills: 0 | Status: SHIPPED | OUTPATIENT
Start: 2018-10-08 | End: 2018-11-28 | Stop reason: SDUPTHER

## 2018-10-08 RX ORDER — OMEPRAZOLE 40 MG/1
CAPSULE, DELAYED RELEASE ORAL
Qty: 90 CAPSULE | Refills: 0 | Status: SHIPPED | OUTPATIENT
Start: 2018-10-08

## 2018-10-15 NOTE — TELEPHONE ENCOUNTER
I have attempted to contact patient several times but have been unable to reach him.  He was supposed to have hardware removal/back surgery soon. If you can get him on the phone for me it would be great or ask him how we can help.

## 2018-10-16 NOTE — TELEPHONE ENCOUNTER
Contacted patient today discussed medications and he states that everything is taken care of that there was a miscommunication with the pharmacy.

## 2018-10-19 ENCOUNTER — TELEPHONE (OUTPATIENT)
Dept: INTERNAL MEDICINE | Facility: CLINIC | Age: 63
End: 2018-10-19

## 2018-10-24 ENCOUNTER — OFFICE VISIT (OUTPATIENT)
Dept: INTERNAL MEDICINE | Facility: CLINIC | Age: 63
End: 2018-10-24

## 2018-10-24 VITALS
HEIGHT: 71 IN | RESPIRATION RATE: 18 BRPM | SYSTOLIC BLOOD PRESSURE: 116 MMHG | TEMPERATURE: 97.7 F | BODY MASS INDEX: 37.52 KG/M2 | WEIGHT: 268 LBS | DIASTOLIC BLOOD PRESSURE: 70 MMHG | HEART RATE: 86 BPM

## 2018-10-24 DIAGNOSIS — G89.29 CHRONIC BILATERAL LOW BACK PAIN, WITH SCIATICA PRESENCE UNSPECIFIED: ICD-10-CM

## 2018-10-24 DIAGNOSIS — Z01.818 PREOP EXAMINATION: Primary | ICD-10-CM

## 2018-10-24 DIAGNOSIS — M54.5 CHRONIC BILATERAL LOW BACK PAIN, WITH SCIATICA PRESENCE UNSPECIFIED: ICD-10-CM

## 2018-10-24 DIAGNOSIS — E11.9 TYPE 2 DIABETES MELLITUS WITHOUT COMPLICATION, UNSPECIFIED WHETHER LONG TERM INSULIN USE (HCC): ICD-10-CM

## 2018-10-24 DIAGNOSIS — Z98.1 S/P LUMBAR SPINAL FUSION: ICD-10-CM

## 2018-10-24 PROCEDURE — 99214 OFFICE O/P EST MOD 30 MIN: CPT | Performed by: NURSE PRACTITIONER

## 2018-10-24 PROCEDURE — 83036 HEMOGLOBIN GLYCOSYLATED A1C: CPT | Performed by: NURSE PRACTITIONER

## 2018-10-24 NOTE — PROGRESS NOTES
Subjective   Gonzalo Oates is a 63 y.o. male who presents to the office today for a preoperative consultation at the request of surgeon Dr. Antoine Caruso who plans on performing spinal fusion/hardware removal on November 1. This consultation is requested for the specific conditions prompting preoperative evaluation (i.e. because of potential affect on operative risk):. Planned anesthesia: per surgical team. The patient has the following known anesthesia issues: none. Patients bleeding risk: no recent abnormal bleeding. Patient does not have objections to receiving blood products if needed.  Patient's records were faxed from [facility] which included chest x-ray EKG and lab results information was reviewed with patient and patient's glucose was 150, potassium 3.4 H&H 11.6 and 37.9.  Patient states that his blood sugar at home has been running high 90s to 110s he indicates he is taking medication as prescribed.  He is ambulating with cane.    Gonzalo Oates can walk up two flights of stairs or two blocks.     The following portions of the patient's history were reviewed and updated as appropriate: allergies, current medications, past family history, past medical history, past social history, past surgical history and problem list.    Review of Systems  Review of Systems   Constitutional: Positive for activity change and fatigue.   Respiratory: Negative.    Cardiovascular: Negative.    Gastrointestinal: Negative.    Musculoskeletal: Positive for arthralgias, back pain, gait problem and myalgias.   Psychiatric/Behavioral: Negative.          Objective   Physical Exam   Constitutional: He is oriented to person, place, and time. He appears well-developed and well-nourished.   Cardiovascular: Normal rate, regular rhythm, normal heart sounds and intact distal pulses.    Pulmonary/Chest: Effort normal and breath sounds normal. He exhibits no tenderness.   Abdominal: Soft. Bowel sounds are normal.    Musculoskeletal: He exhibits tenderness and deformity.        Lumbar back: He exhibits decreased range of motion, tenderness, bony tenderness and pain.   Neurological: He is alert and oriented to person, place, and time.   Skin: Skin is warm and dry.   Psychiatric: He has a normal mood and affect. His behavior is normal. Judgment and thought content normal.   Nursing note and vitals reviewed.        Predictors of intubation difficulty:   Morbid obesity? BMI 37.4     Prominent incisors? no   Receding mandible? no   Short, thick neck? no   Neck range of motion: normal       Mouth opening:normal    Dentition: No chipped, loose, or missing teeth.    Cardiographics  ECG: Sinus bradycardia otherwise normal ECG reviewed copy from [facility] license of [facility] date of service 10/16/18      Imaging  Chest x-ray: Chest x-ray performed 10/16/18 no acute cardiopulmonary process noted patient does have small calcific granuloma in right lung base mentioned in report    Lab Review   See attached sheets  Hyperglycemia 150 and hypkalemi at 3.4.  Discussed dietary modifications to improve  Office Visit on 10/24/2018   Component Date Value Ref Range Status   • Hemoglobin A1C 10/25/2018 6.3  % Final   • Lot Number 10/25/2018 [id number]   Final   • Expiration Date 10/25/2018 6/2020   Final       Assessment/Plan   63 y.o. male with planned surgery as above.    Known risk factors for perioperative complications: Diabetes mellitus    Difficulty with intubation is not anticipated.    Cardiac Risk Estimation: minimal    Current medications which may produce withdrawal symptoms if withheld perioperatively: none    1. Preoperative workup as follows chest x-ray, ECG, and labs.  2. Change in medication regimen before surgery: none, continue medication regimen including morning of surgery, with sip of water.  3. Prophylaxis for cardiac events with perioperative beta-blockers: not indicated.  4. Invasive hemodynamic monitoring perioperatively: at the  discretion of anesthesiologist.  5. Deep vein thrombosis prophylaxis postoperatively:regimen to be chosen by surgical team.  6. Surveillance for postoperative MI with ECG immediately postoperatively and on postoperative days 1 and 2 AND troponin levels 24 hours postoperatively and on day 4 or hospital discharge (whichever comes first): at the discretion of anesthesiologist.  7. Other measures: Infection DVT and pneumonia prevention  Patient is granted medical clearance from a primary care standpoint for surgical intervention.  Reviewed lab results chest x-ray EKG from [facility].    Divine Perez, APRN  10/24/2018

## 2018-10-25 LAB
EXPIRATION DATE: NORMAL
HBA1C MFR BLD: 6.3 %
Lab: NORMAL

## 2018-11-09 ENCOUNTER — HOSPITAL ENCOUNTER (EMERGENCY)
Facility: HOSPITAL | Age: 63
Discharge: ANOTHER HOSPITAL | End: 2018-11-10
Attending: EMERGENCY MEDICINE | Admitting: EMERGENCY MEDICINE

## 2018-11-09 VITALS
WEIGHT: 267 LBS | SYSTOLIC BLOOD PRESSURE: 128 MMHG | OXYGEN SATURATION: 96 % | HEIGHT: 71 IN | RESPIRATION RATE: 12 BRPM | TEMPERATURE: 97.8 F | BODY MASS INDEX: 37.38 KG/M2 | DIASTOLIC BLOOD PRESSURE: 62 MMHG | HEART RATE: 68 BPM

## 2018-11-09 DIAGNOSIS — R45.851 SUICIDAL IDEATION: Primary | ICD-10-CM

## 2018-11-09 LAB
ALBUMIN SERPL-MCNC: 4.52 G/DL (ref 3.2–4.8)
ALBUMIN/GLOB SERPL: 2.5 G/DL (ref 1.5–2.5)
ALP SERPL-CCNC: 106 U/L (ref 25–100)
ALT SERPL W P-5'-P-CCNC: 31 U/L (ref 7–40)
AMPHET+METHAMPHET UR QL: NEGATIVE
AMPHETAMINES UR QL: NEGATIVE
ANION GAP SERPL CALCULATED.3IONS-SCNC: 8 MMOL/L (ref 3–11)
AST SERPL-CCNC: 27 U/L (ref 0–33)
BARBITURATES UR QL SCN: POSITIVE
BASOPHILS # BLD AUTO: 0.03 10*3/MM3 (ref 0–0.2)
BASOPHILS NFR BLD AUTO: 0.4 % (ref 0–1)
BENZODIAZ UR QL SCN: NEGATIVE
BILIRUB SERPL-MCNC: 0.4 MG/DL (ref 0.3–1.2)
BILIRUB UR QL STRIP: NEGATIVE
BUN BLD-MCNC: 18 MG/DL (ref 9–23)
BUN/CREAT SERPL: 22.5 (ref 7–25)
BUPRENORPHINE SERPL-MCNC: NEGATIVE NG/ML
CALCIUM SPEC-SCNC: 9.3 MG/DL (ref 8.7–10.4)
CANNABINOIDS SERPL QL: NEGATIVE
CHLORIDE SERPL-SCNC: 100 MMOL/L (ref 99–109)
CLARITY UR: CLEAR
CO2 SERPL-SCNC: 26 MMOL/L (ref 20–31)
COCAINE UR QL: NEGATIVE
COLOR UR: YELLOW
CREAT BLD-MCNC: 0.8 MG/DL (ref 0.6–1.3)
DEPRECATED RDW RBC AUTO: 46.9 FL (ref 37–54)
EOSINOPHIL # BLD AUTO: 0.2 10*3/MM3 (ref 0–0.3)
EOSINOPHIL NFR BLD AUTO: 2.4 % (ref 0–3)
ERYTHROCYTE [DISTWIDTH] IN BLOOD BY AUTOMATED COUNT: 15.5 % (ref 11.3–14.5)
ETHANOL BLD-MCNC: <10 MG/DL (ref 0–10)
GFR SERPL CREATININE-BSD FRML MDRD: 98 ML/MIN/1.73
GLOBULIN UR ELPH-MCNC: 1.8 GM/DL
GLUCOSE BLD-MCNC: 67 MG/DL (ref 70–100)
GLUCOSE UR STRIP-MCNC: NEGATIVE MG/DL
HCT VFR BLD AUTO: 32.8 % (ref 38.9–50.9)
HGB BLD-MCNC: 10 G/DL (ref 13.1–17.5)
HGB UR QL STRIP.AUTO: NEGATIVE
IMM GRANULOCYTES # BLD: 0.05 10*3/MM3 (ref 0–0.03)
IMM GRANULOCYTES NFR BLD: 0.6 % (ref 0–0.6)
KETONES UR QL STRIP: NEGATIVE
LEUKOCYTE ESTERASE UR QL STRIP.AUTO: NEGATIVE
LYMPHOCYTES # BLD AUTO: 1.73 10*3/MM3 (ref 0.6–4.8)
LYMPHOCYTES NFR BLD AUTO: 21.1 % (ref 24–44)
MCH RBC QN AUTO: 25.1 PG (ref 27–31)
MCHC RBC AUTO-ENTMCNC: 30.5 G/DL (ref 32–36)
MCV RBC AUTO: 82.2 FL (ref 80–99)
METHADONE UR QL SCN: NEGATIVE
MONOCYTES # BLD AUTO: 0.5 10*3/MM3 (ref 0–1)
MONOCYTES NFR BLD AUTO: 6.1 % (ref 0–12)
NEUTROPHILS # BLD AUTO: 5.74 10*3/MM3 (ref 1.5–8.3)
NEUTROPHILS NFR BLD AUTO: 70 % (ref 41–71)
NITRITE UR QL STRIP: NEGATIVE
OPIATES UR QL: NEGATIVE
OXYCODONE UR QL SCN: POSITIVE
PCP UR QL SCN: NEGATIVE
PH UR STRIP.AUTO: 6.5 [PH] (ref 5–8)
PLATELET # BLD AUTO: 279 10*3/MM3 (ref 150–450)
PMV BLD AUTO: 9.2 FL (ref 6–12)
POTASSIUM BLD-SCNC: 4.1 MMOL/L (ref 3.5–5.5)
PROPOXYPH UR QL: NEGATIVE
PROT SERPL-MCNC: 6.3 G/DL (ref 5.7–8.2)
PROT UR QL STRIP: NEGATIVE
RBC # BLD AUTO: 3.99 10*6/MM3 (ref 4.2–5.76)
SODIUM BLD-SCNC: 134 MMOL/L (ref 132–146)
SP GR UR STRIP: 1.01 (ref 1–1.03)
TRICYCLICS UR QL SCN: NEGATIVE
UROBILINOGEN UR QL STRIP: NORMAL
WBC NRBC COR # BLD: 8.2 10*3/MM3 (ref 3.5–10.8)

## 2018-11-09 PROCEDURE — 85025 COMPLETE CBC W/AUTO DIFF WBC: CPT | Performed by: EMERGENCY MEDICINE

## 2018-11-09 PROCEDURE — 80306 DRUG TEST PRSMV INSTRMNT: CPT | Performed by: EMERGENCY MEDICINE

## 2018-11-09 PROCEDURE — 99284 EMERGENCY DEPT VISIT MOD MDM: CPT

## 2018-11-09 PROCEDURE — 80307 DRUG TEST PRSMV CHEM ANLYZR: CPT | Performed by: EMERGENCY MEDICINE

## 2018-11-09 PROCEDURE — 80053 COMPREHEN METABOLIC PANEL: CPT | Performed by: EMERGENCY MEDICINE

## 2018-11-09 PROCEDURE — 81003 URINALYSIS AUTO W/O SCOPE: CPT | Performed by: EMERGENCY MEDICINE

## 2018-11-09 RX ORDER — CYCLOBENZAPRINE HCL 10 MG
10 TABLET ORAL ONCE
Status: COMPLETED | OUTPATIENT
Start: 2018-11-09 | End: 2018-11-09

## 2018-11-09 RX ORDER — GABAPENTIN 400 MG/1
CAPSULE ORAL
Status: DISCONTINUED
Start: 2018-11-09 | End: 2018-11-10 | Stop reason: HOSPADM

## 2018-11-09 RX ORDER — OXYCODONE AND ACETAMINOPHEN 7.5; 325 MG/1; MG/1
1 TABLET ORAL ONCE
Status: COMPLETED | OUTPATIENT
Start: 2018-11-09 | End: 2018-11-09

## 2018-11-09 RX ORDER — OXYCODONE AND ACETAMINOPHEN 7.5; 325 MG/1; MG/1
TABLET ORAL
Status: DISCONTINUED
Start: 2018-11-09 | End: 2018-11-10 | Stop reason: HOSPADM

## 2018-11-09 RX ORDER — CYCLOBENZAPRINE HCL 10 MG
TABLET ORAL
Status: COMPLETED
Start: 2018-11-09 | End: 2018-11-09

## 2018-11-09 RX ORDER — PRAMIPEXOLE DIHYDROCHLORIDE 1 MG/1
1 TABLET ORAL ONCE
Status: COMPLETED | OUTPATIENT
Start: 2018-11-09 | End: 2018-11-09

## 2018-11-09 RX ORDER — GABAPENTIN 400 MG/1
800 CAPSULE ORAL 3 TIMES DAILY
Status: DISCONTINUED | OUTPATIENT
Start: 2018-11-09 | End: 2018-11-10 | Stop reason: HOSPADM

## 2018-11-09 RX ADMIN — GABAPENTIN 800 MG: 400 CAPSULE ORAL at 21:06

## 2018-11-09 RX ADMIN — Medication 10 MG: at 23:11

## 2018-11-09 RX ADMIN — PRAMIPEXOLE DIHYDROCHLORIDE 1 MG: 1 TABLET ORAL at 23:11

## 2018-11-09 RX ADMIN — CYCLOBENZAPRINE HYDROCHLORIDE 10 MG: 10 TABLET, FILM COATED ORAL at 23:11

## 2018-11-09 RX ADMIN — OXYCODONE HYDROCHLORIDE AND ACETAMINOPHEN 1 TABLET: 7.5; 325 TABLET ORAL at 20:18

## 2018-11-09 NOTE — ED PROVIDER NOTES
"Subjective   Gonzalo Oates is a 63 y.o.male who presents to the ED with suicidal ideations. The patient says that his daughter called EMS from [place] because he said that \"he didn't want to be around here anymore.\" The patient says that he has had severe back pain for over 3 years which has impaired his lifestyle. He takes Percocet, but they do not help completely relieve his pain. He has not worked in over 3 years and says that he is in a large amount of debt from hospital bills. He says that he more seriously considered not wanting to live anymore today because of his wife complaining to him frequently while he is dealing with his own problems. He has trouble walking secondary to leg and back pain and can barely make it up the stairs in his own home. He denies any hallucinating, hearing voices, fevers, vomiting, diarrhea, chest pain, or alcohol abuse. There are no other acute complaints at this time.             History provided by:  Patient  Mental Health Problem   Presenting symptoms: suicidal thoughts    Presenting symptoms: no hallucinations    Degree of incapacity (severity):  Mild  Onset quality:  Unable to specify  Timing:  Constant  Progression:  Unchanged  Chronicity:  New  Context comment:  Chronic back pain debilitating his life  Treatment compliance:  Untreated  Relieved by:  None tried  Worsened by:  Nothing  Ineffective treatments:  None tried  Associated symptoms: no chest pain        Review of Systems   Constitutional: Negative for fever.   Cardiovascular: Negative for chest pain.   Gastrointestinal: Negative for diarrhea and vomiting.   Musculoskeletal: Positive for back pain.        And leg pain.    Psychiatric/Behavioral: Positive for suicidal ideas. Negative for hallucinations.   All other systems reviewed and are negative.      Past Medical History:   Diagnosis Date   • Ankle fracture     left 02/2015   • Anxiety    • Arthritis    • BPH (benign prostatic hyperplasia)    • Cataract    • " Chronic back pain    • Chronic sinusitis    • Colon polyps    • Constipation     OPIOD INDUCED   • Deep vein thrombosis (DVT) of iliac vein (CMS/HCC)    • Depression    • Diabetes mellitus (CMS/HCC)     PRE DIABETES NO MEDS   • Erectile dysfunction    • GERD (gastroesophageal reflux disease)    • Herniated disc, cervical    • Hyperlipidemia    • Hypertension    • IBS (irritable bowel syndrome)    • Left knee pain    • Lumbar herniated disc    • Migraine headache    • Narcolepsy    • Obesity    • KO (obstructive sleep apnea)     non compliant with CPAP    • Prediabetes    • Restless leg     supressed with narcotics    • Sleep apnea    • Snoring    • SOB (shortness of breath)    • Umbilical hernia    • UTI (urinary tract infection)    • Varicose veins    • Venous insufficiency    • Wears glasses        Allergies   Allergen Reactions   • Calcium Channel Blockers Swelling     Lower extremity edema       • Adhesive Tape Rash   • Nickel Swelling   • Zinc Oxide Rash and Unknown (See Comments)     SENSITIVITY ,CAUSES RASH         Past Surgical History:   Procedure Laterality Date   • APPENDECTOMY     • BACK SURGERY     • BACK SURGERY     • FRACTURE SURGERY      LEFT ANKLE   • KNEE ARTHROSCOPY Left 6/27/2018    Procedure: diagnostic arthroscopy partial medial meniscectomy or other procedures as necessary, left knee;  Surgeon: Tj Hoffman MD;  Location: [facility] [place];  Service: Orthopedics   • LAMINECTOMY     • SINUS SURGERY     • TONSILLECTOMY     • ULNAR NERVE TRANSPOSITION     • VASECTOMY     • WISDOM TOOTH EXTRACTION         Family History   Problem Relation Age of Onset   • Cancer Other    • Diabetes Other    • Heart disease Other    • Hypertension Other    • Endocrine tumor Mother    • Anxiety disorder Mother    • Arthritis Mother    • Hypertension Mother    • Heart attack Father    • Hypertension Father    • Glaucoma Father    • Cancer Maternal Grandmother        Social History     Social History   • Marital status:       Social History Main Topics   • Smoking status: Never Smoker   • Smokeless tobacco: Never Used   • Alcohol use Yes      Comment: 3 shots of liquor a week   • Drug use: No   • Sexual activity: Defer     Other Topics Concern   • Not on file         Objective   Physical Exam   Constitutional: He is oriented to person, place, and time. He appears well-developed and well-nourished.   HENT:   Head: Normocephalic and atraumatic.   Eyes: Conjunctivae are normal. No scleral icterus.   Neck: Neck supple.   Cardiovascular: Normal rate.    Pulmonary/Chest: Effort normal.   Musculoskeletal: Normal range of motion.   Neurological: He is alert and oriented to person, place, and time.   Skin: Skin is warm and dry.   Psychiatric: He has a normal mood and affect. His behavior is normal. Thought content normal.   Nursing note and vitals reviewed.      Procedures         ED Course  ED Course as of Nov 10 2118   Fri Nov 09, 2018   2235 Pt. requesting his evening meds prior to transfer to [facility], two of which I ordered.  [LI]      ED Course User Index  [LI] Marino Wilson MD     Labs benign.  Seen by [facility] and pt will go voluntarily to the [facility] for SI.                MDM  Number of Diagnoses or Management Options  Suicidal ideation:      Amount and/or Complexity of Data Reviewed  Clinical lab tests: ordered and reviewed  Discuss the patient with other providers: yes        Final diagnoses:   Suicidal ideation       Documentation assistance provided by radha Sanford.  Information recorded by the radha was done at my direction and has been verified and validated by me.     Darrel Sanford  11/09/18 1528       Darrel Sanford  11/09/18 1949       Emil Valadez MD  11/09/18 2004       Emil Valadez MD  11/10/18 2119

## 2018-11-14 ENCOUNTER — OFFICE VISIT (OUTPATIENT)
Dept: INTERNAL MEDICINE | Facility: CLINIC | Age: 63
End: 2018-11-14

## 2018-11-14 VITALS
HEIGHT: 71 IN | TEMPERATURE: 97.6 F | HEART RATE: 97 BPM | SYSTOLIC BLOOD PRESSURE: 110 MMHG | BODY MASS INDEX: 36.68 KG/M2 | RESPIRATION RATE: 15 BRPM | WEIGHT: 262 LBS | OXYGEN SATURATION: 95 % | DIASTOLIC BLOOD PRESSURE: 74 MMHG

## 2018-11-14 DIAGNOSIS — Z98.1 S/P LUMBAR SPINAL FUSION: ICD-10-CM
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
## 2018-11-14 DIAGNOSIS — F33.2 SEVERE EPISODE OF RECURRENT MAJOR DEPRESSIVE DISORDER, WITHOUT PSYCHOTIC FEATURES (HCC): Primary | ICD-10-CM

## 2018-11-14 PROCEDURE — 99214 OFFICE O/P EST MOD 30 MIN: CPT | Performed by: NURSE PRACTITIONER

## 2018-11-14 NOTE — PROGRESS NOTES
Chief Complaint / Reason:      Chief Complaint   Patient presents with   • Follow-up     hospital- had fall-       Subjective     HPI  Patient presents today for hospital follow-up.  He had recent fall but is under a lot of stress because his wife is at home and he ended up being admitted for observation as he presented to [facility] emergency department on 11/9/18 with suicidal ideations.  Patient indicates that his daughter called EMS from multiple because he said he didn't want to be around anymore and he has had severe back pain for over 3 years and has been through multiple back surgeries he takes narcotics and has a lot of bit from hospital bills.  He states he is just stressed out and wanted someone to talk to.  He denies any hallucinating voices fevers vomiting diarrhea chest pain or alcohol abuse.  He was transferred from [facility] to [facility].  He states that his wife is fearful of him harming her so she did not want to let him him whenever he knocked up on the door and that she is currently staying with their daughter in [place].  He denies SI or HI  History taken from: patient    PMH/FH/Social History were reviewed and updated appropriately in the electronic medical record.     Review of Systems:   Review of Systems   Constitutional: Positive for activity change and fatigue.   Respiratory: Negative.    Cardiovascular: Negative.    Gastrointestinal: Negative.    Musculoskeletal: Positive for arthralgias, back pain, gait problem and myalgias.   Skin: Positive for wound.   Psychiatric/Behavioral: Positive for sleep disturbance. The patient is nervous/anxious.         Depressed       All other systems were reviewed and are negative.  Exceptions are noted in the subjective or above.      Objective     Vital Signs  Vitals:    11/14/18 0934   BP: 110/74   Pulse: 97   Resp: 15   Temp: 97.6 °F (36.4 °C)   SpO2: 95%       Body mass index is 36.54 kg/m².    Physical Exam    Constitutional: He is oriented to person, place, and time. He appears well-developed and well-nourished.   Cardiovascular: Normal rate, regular rhythm, normal heart sounds and intact distal pulses.   Pulmonary/Chest: Effort normal and breath sounds normal. He exhibits no tenderness.   Abdominal: Soft. Bowel sounds are normal.   Musculoskeletal: He exhibits tenderness and deformity.        Lumbar back: He exhibits decreased range of motion, tenderness, bony tenderness and pain.   Neurological: He is alert and oriented to person, place, and time.   Skin: Skin is warm and dry.   Incision (see photo)   Psychiatric: Judgment and thought content normal. His mood appears anxious. His affect is angry. He is agitated. He exhibits a depressed mood.   Nursing note and vitals reviewed.       Results Review:    I reviewed the patient's previous clinical results.       Medication Review:     Current Outpatient Medications:   •  betamethasone dipropionate (DIPROLENE) 0.05 % ointment, Apply 1 application topically 2 (Two) Times a Day., Disp: , Rfl:   •  butalbital-acetaminophen-caffeine (FIORICET) -40 MG capsule capsule, Take 1 capsule by mouth as needed., Disp: , Rfl:   •  carboxymethylcellulose (REFRESH PLUS) 0.5 % solution, 2 drops 3 (Three) Times a Day As Needed for Dry Eyes., Disp: , Rfl:   •  chlorthalidone (HYGROTON) 25 MG tablet, Take 25 mg by mouth., Disp: , Rfl:   •  cyclobenzaprine (FLEXERIL) 10 MG tablet, Every 8 (Eight) Hours., Disp: , Rfl:   •  DULoxetine (CYMBALTA) 60 MG capsule, Take 1 capsule by mouth Daily., Disp: 90 capsule, Rfl: 1  •  gabapentin (NEURONTIN) 800 MG tablet, TAKE 1 TABLET THREE TIMES DAILY (Patient taking differently: qid), Disp: 90 tablet, Rfl: 0  •  glucose monitor monitoring kit, As directed, Disp: 1 each, Rfl: 0  •  ketoconazole (NIZORAL) 2 % cream, Apply 1 application topically Daily., Disp: , Rfl:   •  nystatin-triamcinolone (MYCOLOG II) cream, Apply  topically 3 (three) times a day.,  Disp: , Rfl:   •  omeprazole (priLOSEC) 40 MG capsule, TAKE 1 CAPSULE EVERY DAY, Disp: 90 capsule, Rfl: 0  •  oxyCODONE-acetaminophen (LYNOX)  MG per tablet, Take 1 tablet by mouth 3 (Three) Times a Day., Disp: , Rfl:   •  pramipexole (MIRAPEX) 1 MG tablet, TAKE 1 TABLET TWICE DAILY, Disp: 180 tablet, Rfl: 0  •  pravastatin (PRAVACHOL) 40 MG tablet, Take 40 mg by mouth Daily., Disp: , Rfl:   •  spironolactone (ALDACTONE) 25 MG tablet, Take 1 tablet by mouth Daily., Disp: 90 tablet, Rfl: 1  •  SUMAtriptan (IMITREX) 50 MG tablet, Take 50 mg by mouth., Disp: , Rfl:   •  tolnaftate (TINACTIN) 1 % external solution, Apply 1 application topically Every Night., Disp: , Rfl:   •  TRUE METRIX BLOOD GLUCOSE TEST test strip, TEST EVERY DAY, Disp: 100 each, Rfl: 1  •  TRUEPLUS LANCETS 28G misc, TEST DAILY , Disp: 100 each, Rfl: 1  •  amoxicillin (AMOXIL) 500 MG capsule, Take 1 capsule by mouth 3 (Three) Times a Day for 10 days., Disp: 30 capsule, Rfl: 0    Assessment/Plan   Gonzalo was seen today for follow-up.    Diagnoses and all orders for this visit:    Severe episode of recurrent major depressive disorder, without psychotic features (CMS/HCC)  -     Ambulatory Referral to Psychiatry  Recommend patient take medication as prescribed.  S/P lumbar spinal fusion  Stable incision healing   Fall, initial encounter  Discussed fall prevention and safety      Return if symptoms worsen or fail to improve.    Divine Perez, APRN  11/14/2018

## 2018-11-19 ENCOUNTER — TELEPHONE (OUTPATIENT)
Dept: INTERNAL MEDICINE | Facility: CLINIC | Age: 63
End: 2018-11-19

## 2018-11-19 NOTE — TELEPHONE ENCOUNTER
Kiara left a vm on my phone about patient. Patient is anxious and she is going to refer him to [facility] if he doesn't get in with psych quick enough. Zamzam informed and agreed if she can get him in quicker. Dunia was called back and given the ok. Also given the name of  we are referring him too.

## 2018-11-20 ENCOUNTER — PATIENT MESSAGE (OUTPATIENT)
Dept: CARDIOLOGY | Facility: CLINIC | Age: 63
End: 2018-11-20

## 2018-11-28 RX ORDER — PRAMIPEXOLE DIHYDROCHLORIDE 1 MG/1
TABLET ORAL
Qty: 180 TABLET | Refills: 0 | OUTPATIENT
Start: 2018-11-28 | End: 2019-02-06 | Stop reason: HOSPADM

## 2018-11-29 ENCOUNTER — DOCUMENTATION (OUTPATIENT)
Dept: INTERNAL MEDICINE | Facility: CLINIC | Age: 63
End: 2018-11-29

## 2018-11-29 ENCOUNTER — TELEPHONE (OUTPATIENT)
Dept: INTERNAL MEDICINE | Facility: CLINIC | Age: 63
End: 2018-11-29

## 2018-11-29 NOTE — TELEPHONE ENCOUNTER
Called patient per Zamzam and told them to follow what the dr said in the hospital. He is still there and had surgery this morning.

## 2018-11-29 NOTE — PROGRESS NOTES
In hospital right now, getting Kefzol 2grams bid and want to know if he should take his amoxil too.

## 2018-12-03 ENCOUNTER — OFFICE VISIT (OUTPATIENT)
Dept: INTERNAL MEDICINE | Facility: CLINIC | Age: 63
End: 2018-12-03

## 2018-12-03 VITALS
HEART RATE: 75 BPM | DIASTOLIC BLOOD PRESSURE: 60 MMHG | BODY MASS INDEX: 36.54 KG/M2 | OXYGEN SATURATION: 99 % | RESPIRATION RATE: 18 BRPM | HEIGHT: 71 IN | TEMPERATURE: 97.5 F | WEIGHT: 261 LBS | SYSTOLIC BLOOD PRESSURE: 130 MMHG

## 2018-12-03 DIAGNOSIS — T40.2X5A CONSTIPATION DUE TO OPIOID THERAPY: ICD-10-CM

## 2018-12-03 DIAGNOSIS — K59.03 CONSTIPATION DUE TO OPIOID THERAPY: ICD-10-CM

## 2018-12-03 DIAGNOSIS — Z98.1 S/P LUMBAR SPINAL FUSION: ICD-10-CM

## 2018-12-03 DIAGNOSIS — I10 ESSENTIAL HYPERTENSION: ICD-10-CM

## 2018-12-03 DIAGNOSIS — N50.89 SCROTAL SWELLING: Primary | ICD-10-CM

## 2018-12-03 PROCEDURE — 99214 OFFICE O/P EST MOD 30 MIN: CPT | Performed by: NURSE PRACTITIONER

## 2018-12-03 RX ORDER — DOCUSATE SODIUM 250 MG
250 CAPSULE ORAL DAILY
Qty: 30 CAPSULE | Refills: 1 | OUTPATIENT
Start: 2018-12-03 | End: 2019-02-06 | Stop reason: HOSPADM

## 2018-12-03 RX ORDER — POLYETHYLENE GLYCOL 3350 17 G/17G
17 POWDER, FOR SOLUTION ORAL DAILY
Qty: 100 EACH | Refills: 4 | OUTPATIENT
Start: 2018-12-03 | End: 2019-02-06 | Stop reason: HOSPADM

## 2018-12-03 NOTE — PROGRESS NOTES
Chief Complaint / Reason:      Chief Complaint   Patient presents with   • Groin Swelling     has started going down but my scrotum is hard and thick       Subjective     HPI   Patient presents today for complaints of scrotal swelling.  He denies fever or chills.  He patient had back surgery 11/29 with Dr. Castro.  It was anterior lumbar interbody fusion L4 S1 with instrumentation  And hardware removal L5 L1.  Patient states that he has been in a lot of pain in his care he area but he was much more swollen while he was in the hospital and was hoping that the swelling with since improved.  Patient denies any urinary symptoms and has not had any trouble voiding.  He states he has been having some constipation issues but was on medication for it and did have a bowel movement yesterday.  His abdomen is very distended.  denies chest pain, shortness of breath or heart palpitations.  History taken from: patient    PMH/FH/Social History were reviewed and updated appropriately in the electronic medical record.     Review of Systems:   Review of Systems   Constitutional: Positive for fatigue. Negative for fever.   HENT: Negative for voice change.    Eyes: Negative for visual disturbance.   Respiratory: Negative for shortness of breath.    Cardiovascular: Negative for chest pain.   Gastrointestinal: Negative for abdominal distention and abdominal pain.   Genitourinary: Positive for scrotal swelling. Negative for dysuria.   Musculoskeletal: Positive for arthralgias, back pain, gait problem and myalgias. Negative for joint swelling.   Skin: Positive for color change. Negative for rash.        Abdominal incision   Neurological: Negative for speech difficulty.   Hematological: Does not bruise/bleed easily.   Psychiatric/Behavioral: Positive for sleep disturbance. Negative for confusion.     All other systems were reviewed and are negative.  Exceptions are noted in the subjective or above.      Objective     Vital Signs  Vitals:     12/03/18 1736   BP: 130/60   Pulse: 75   Resp: 18   Temp: 97.5 °F (36.4 °C)   SpO2: 99%       Body mass index is 36.4 kg/m².    Physical Exam   Constitutional: He is oriented to person, place, and time. He appears well-developed and well-nourished.   Cardiovascular: Normal rate, regular rhythm, normal heart sounds and intact distal pulses.   Pulmonary/Chest: Effort normal and breath sounds normal. He exhibits no tenderness.   Abdominal: Soft. Bowel sounds are normal. He exhibits distension. There is generalized tenderness.       Genitourinary: Right testis shows swelling and tenderness. Left testis shows swelling and tenderness.   Genitourinary Comments: bialteral testicles swollen and appears to be fluid filled where swelling is decreasing.    Musculoskeletal: He exhibits tenderness and deformity.        Lumbar back: He exhibits decreased range of motion, tenderness, bony tenderness and pain.   Neurological: He is alert and oriented to person, place, and time.   Skin: Skin is warm and dry.   Psychiatric: He has a normal mood and affect. His behavior is normal. Judgment and thought content normal.   Nursing note and vitals reviewed.       Results Review:    I reviewed the patient's previous clinical results.       Medication Review:     Current Outpatient Medications:   •  amoxicillin (AMOXIL) 500 MG capsule, Take 1 capsule by mouth 3 (Three) Times a Day for 10 days., Disp: 30 capsule, Rfl: 0  •  betamethasone dipropionate (DIPROLENE) 0.05 % ointment, Apply 1 application topically 2 (Two) Times a Day., Disp: , Rfl:   •  butalbital-acetaminophen-caffeine (FIORICET) -40 MG capsule capsule, Take 1 capsule by mouth as needed., Disp: , Rfl:   •  carboxymethylcellulose (REFRESH PLUS) 0.5 % solution, 2 drops 3 (Three) Times a Day As Needed for Dry Eyes., Disp: , Rfl:   •  chlorthalidone (HYGROTON) 25 MG tablet, Take 25 mg by mouth., Disp: , Rfl:   •  cyclobenzaprine (FLEXERIL) 10 MG tablet, Every 8 (Eight) Hours.,  Disp: , Rfl:   •  DULoxetine (CYMBALTA) 60 MG capsule, Take 1 capsule by mouth Daily., Disp: 90 capsule, Rfl: 1  •  gabapentin (NEURONTIN) 800 MG tablet, TAKE 1 TABLET THREE TIMES DAILY (Patient taking differently: tid), Disp: 90 tablet, Rfl: 0  •  glucose monitor monitoring kit, As directed, Disp: 1 each, Rfl: 0  •  ketoconazole (NIZORAL) 2 % cream, Apply 1 application topically Daily., Disp: , Rfl:   •  nystatin-triamcinolone (MYCOLOG II) cream, Apply  topically 3 (three) times a day., Disp: , Rfl:   •  omeprazole (priLOSEC) 40 MG capsule, TAKE 1 CAPSULE EVERY DAY, Disp: 90 capsule, Rfl: 0  •  oxyCODONE-acetaminophen (LYNOX)  MG per tablet, Take 1 tablet by mouth 3 (Three) Times a Day., Disp: , Rfl:   •  pramipexole (MIRAPEX) 1 MG tablet, TAKE 1 TABLET TWICE DAILY, Disp: 180 tablet, Rfl: 0  •  pravastatin (PRAVACHOL) 40 MG tablet, Take 40 mg by mouth Daily., Disp: , Rfl:   •  spironolactone (ALDACTONE) 25 MG tablet, Take 1 tablet by mouth Daily., Disp: 90 tablet, Rfl: 1  •  SUMAtriptan (IMITREX) 50 MG tablet, Take 50 mg by mouth., Disp: , Rfl:   •  tolnaftate (TINACTIN) 1 % external solution, Apply 1 application topically Every Night., Disp: , Rfl:   •  TRUE METRIX BLOOD GLUCOSE TEST test strip, TEST EVERY DAY, Disp: 100 each, Rfl: 1  •  TRUEPLUS LANCETS 28G misc, TEST DAILY , Disp: 100 each, Rfl: 1  •  docusate sodium (COLACE) 250 MG capsule, Take 1 capsule by mouth Daily., Disp: 30 capsule, Rfl: 1  •  polyethylene glycol (MIRALAX) packet, Take 17 g by mouth Daily., Disp: 100 each, Rfl: 4  No current facility-administered medications for this visit.     Assessment/Plan   Gonzalo was seen today for groin swelling.    Diagnoses and all orders for this visit:    Scrotal swelling  Discussed scrotal wrap and an recommend patient elevate scrotum when sleeping or sitting.  Discussed worsening signs and symptoms and advised patient to increase mobility as tolerated  S/P lumbar spinal fusion  Stable without worsening  signs and symptoms  Essential hypertension  Stable without worsening signs and symptoms  Constipation due to opioid therapy  -     polyethylene glycol (MIRALAX) packet; Take 17 g by mouth Daily.  -     docusate sodium (COLACE) 250 MG capsule; Take 1 capsule by mouth Daily.  Recommend patient maintain hydration and nutrition and take medication as prescribed and advised him to avoid straining.      Return if symptoms worsen or fail to improve.    Divine Perez, APRN  12/03/2018

## 2018-12-05 ENCOUNTER — OFFICE VISIT (OUTPATIENT)
Dept: NEUROLOGY | Facility: CLINIC | Age: 63
End: 2018-12-05

## 2018-12-05 VITALS
SYSTOLIC BLOOD PRESSURE: 138 MMHG | DIASTOLIC BLOOD PRESSURE: 70 MMHG | HEART RATE: 70 BPM | OXYGEN SATURATION: 99 % | WEIGHT: 260 LBS | HEIGHT: 71 IN | BODY MASS INDEX: 36.4 KG/M2

## 2018-12-05 DIAGNOSIS — F45.8 OTHER SOMATOFORM DISORDERS: ICD-10-CM

## 2018-12-05 DIAGNOSIS — G47.19 EXCESSIVE DAYTIME SLEEPINESS: ICD-10-CM

## 2018-12-05 DIAGNOSIS — G47.61 PERIODIC LIMB MOVEMENT DISORDER (PLMD): ICD-10-CM

## 2018-12-05 DIAGNOSIS — G47.34 NOCTURNAL OXYGEN DESATURATION: ICD-10-CM

## 2018-12-05 DIAGNOSIS — G47.33 OBSTRUCTIVE SLEEP APNEA: Primary | ICD-10-CM

## 2018-12-05 DIAGNOSIS — R40.0 SOMNOLENCE: ICD-10-CM

## 2018-12-05 DIAGNOSIS — G47.419 PRIMARY NARCOLEPSY WITHOUT CATAPLEXY: ICD-10-CM

## 2018-12-05 DIAGNOSIS — G25.81 RESTLESS LEGS SYNDROME (RLS): ICD-10-CM

## 2018-12-05 PROCEDURE — 99204 OFFICE O/P NEW MOD 45 MIN: CPT | Performed by: PSYCHIATRY & NEUROLOGY

## 2018-12-05 NOTE — PROGRESS NOTES
[facility] NEUROLOGY [place] PROGRESS NOTE  History of Present Illness     Date: 12/5/2018    Patient Identification  Gonzalo Oates is a 63 y.o. male.    Patient information was obtained from patient.  History/Exam limitations: none.    Original consultation requested by: ZAHIDA Coronado      Chief Complaint   Sleeping Problem (NP here to establish care for KO, pt has previously been on BiPAP) and Pain (Pt currently on Gabapentin, would like to discuss, he uses for RLS and nerve pain)      History of Present Illness   Patient is a delightful 63-year-old gentleman referred to [facility] neurology [place] for evaluation of obstructive sleep apnea, RLS and Narcolepsy.  Patient has been related by physician in [place].  He was diagnosis of obstructive sleep apnea and restless leg syndrome.  He was being placed on nasal BiPAP after failing nasal CPAP.  Patient continued to experience excessive daytime sleepiness.  He was subsequently diagnosed of narcolepsy by his neurologist in [place] after moving to [place].    Patient usually good to bed around 10:00 to 11:30 normally take him 10 minutes to fall asleep in his usual bedtime ritual including watching television or working on the computer prior to going to sleep.  Patient reported that he often experience creepy crawly sensation in his leg associated with pain and discomfort while he was working on computer or watching television.  Patient often feel the urge to move his leg.  His discomfort only improved when he moved his legs.  Patient has been treated with ropinirole up to 10 pills a day but his symptoms has worsened and he began to develop augmentation .  Ropinirole was subsequently discontinued.  He is currently on pramipexole 1 mg twice daily.  Patient reports continued to have periodic limb movements of sleep.  Patient has been treated with gabapentin 800 mg 3 times a day and patient has even try Neupro 4 mg a day and clonazepam 0.5 mg at  bedtime none of them since to be beneficial to patient.     Patient still have severe daytime sleepiness patient will be fall asleep while reading or watching television or in public places or as a passenger in the car whenever he gets chest to lay down to rest in the afternoon or sitting quietly after lunch or even during conversation or even driving a car patient scored 21 out of 24 in the [place] sleepiness scale.    Then his physician in [place] tell him that he has narcolepsy despite the fact that patient does not have sleep paralysis or hypnagogic hallucination or cataplexy.  Patient has been treated with stimulant for number of years.  He continues to experience daytime sleepiness.  Patient subsequently presented here for a second opinion.    PMH:   Past Medical History:   Diagnosis Date   • Ankle fracture     left 02/2015   • Anxiety    • Arthritis    • Asthma    • BPH (benign prostatic hyperplasia)    • Cataract    • Chronic back pain    • Chronic sinusitis    • Colon polyps    • Constipation     OPIOD INDUCED   • CTS (carpal tunnel syndrome)    • Deep vein thrombosis (DVT) of iliac vein (CMS/HCC)    • Depression    • Diabetes mellitus (CMS/HCC)     PRE DIABETES NO MEDS   • Difficulty walking    • Erectile dysfunction    • GERD (gastroesophageal reflux disease)    • Headache, tension-type    • Herniated disc, cervical    • HL (hearing loss)    • Hyperlipidemia    • Hypertension    • IBS (irritable bowel syndrome)    • Left knee pain    • Lumbar herniated disc    • Memory loss    • Migraine headache    • Mitral valve prolapse    • Movement disorder    • Narcolepsy    • Obesity    • KO (obstructive sleep apnea)     non compliant with CPAP    • Peripheral vascular disease (CMS/HCC)    • Prediabetes    • Restless leg     supressed with narcotics    • Sleep apnea    • Snoring    • SOB (shortness of breath)    • Umbilical hernia    • UTI (urinary tract infection)    • Varicose veins    • Venous insufficiency     • Wears glasses        Past Surgical History:   Past Surgical History:   Procedure Laterality Date   • APPENDECTOMY     • BACK SURGERY     • BACK SURGERY     • CEREBRAL ANGIOGRAM     • FRACTURE SURGERY      LEFT ANKLE   • LAMINECTOMY     • SINUS SURGERY     • TONSILLECTOMY     • ULNAR NERVE TRANSPOSITION     • VASCULAR SURGERY     • VASECTOMY     • WISDOM TOOTH EXTRACTION         Family Hisotry:   Family History   Problem Relation Age of Onset   • Cancer Other    • Diabetes Other    • Heart disease Other    • Hypertension Other    • Endocrine tumor Mother    • Anxiety disorder Mother    • Arthritis Mother    • Hypertension Mother    • Seizures Mother         Due to brain surgery   • Heart attack Father    • Hypertension Father    • Glaucoma Father    • Cancer Maternal Grandmother    • Heart attack Paternal Grandfather    • Hypertension Brother    • Neuropathy Brother    • Hypertension Sister    • Migraines Sister        Social History:   Social History     Socioeconomic History   • Marital status:      Spouse name: Not on file   • Number of children: Not on file   • Years of education: Not on file   • Highest education level: Not on file   Social Needs   • Financial resource strain: Not on file   • Food insecurity - worry: Not on file   • Food insecurity - inability: Not on file   • Transportation needs - medical: Not on file   • Transportation needs - non-medical: Not on file   Occupational History   • Not on file   Tobacco Use   • Smoking status: Never Smoker   • Smokeless tobacco: Never Used   • Tobacco comment: Never used.   Substance and Sexual Activity   • Alcohol use: Yes     Types: 3 Shots of liquor per week     Comment: per week   • Drug use: No   • Sexual activity: No   Other Topics Concern   • Not on file   Social History Narrative   • Not on file       Medications:   Current Outpatient Medications   Medication Sig Dispense Refill   • betamethasone dipropionate (DIPROLENE) 0.05 % ointment Apply 1  application topically 2 (Two) Times a Day.     • butalbital-acetaminophen-caffeine (FIORICET) -40 MG capsule capsule Take 1 capsule by mouth as needed.     • chlorthalidone (HYGROTON) 25 MG tablet Take 25 mg by mouth.     • cyclobenzaprine (FLEXERIL) 10 MG tablet Every 8 (Eight) Hours.     • DULoxetine (CYMBALTA) 60 MG capsule Take 1 capsule by mouth Daily. 90 capsule 1   • gabapentin (NEURONTIN) 800 MG tablet TAKE 1 TABLET THREE TIMES DAILY (Patient taking differently: tid) 90 tablet 0   • glucose monitor monitoring kit As directed 1 each 0   • ketoconazole (NIZORAL) 2 % cream Apply 1 application topically Daily.     • nystatin-triamcinolone (MYCOLOG II) cream Apply  topically 3 (three) times a day.     • omeprazole (priLOSEC) 40 MG capsule TAKE 1 CAPSULE EVERY DAY 90 capsule 0   • oxyCODONE-acetaminophen (PERCOCET)  MG per tablet Take 1 tablet by mouth Every 4 (Four) Hours As Needed.     • polyethylene glycol (MIRALAX) packet Take 17 g by mouth Daily. 100 each 4   • pramipexole (MIRAPEX) 1 MG tablet TAKE 1 TABLET TWICE DAILY 180 tablet 0   • pravastatin (PRAVACHOL) 40 MG tablet Take 40 mg by mouth Daily.     • spironolactone (ALDACTONE) 25 MG tablet Take 1 tablet by mouth Daily. 90 tablet 1   • SUMAtriptan (IMITREX) 50 MG tablet Take 50 mg by mouth.     • tolnaftate (TINACTIN) 1 % external solution Apply 1 application topically Every Night.     • TRUE METRIX BLOOD GLUCOSE TEST test strip TEST EVERY  each 1   • TRUEPLUS LANCETS 28G misc TEST DAILY  100 each 1   • carboxymethylcellulose (REFRESH PLUS) 0.5 % solution 2 drops 3 (Three) Times a Day As Needed for Dry Eyes.     • docusate sodium (COLACE) 250 MG capsule Take 1 capsule by mouth Daily. 30 capsule 1     No current facility-administered medications for this visit.        Allergy:   Allergies   Allergen Reactions   • Calcium Channel Blockers Swelling     Lower extremity edema       • Adhesive Tape Rash   • Chlorhexidine Rash   • Nickel  "Swelling   • Zinc Oxide Rash and Unknown (See Comments)     SENSITIVITY ,CAUSES RASH         Review of Systems:  Review of Systems   Constitutional: Positive for fatigue. Negative for chills and fever.   HENT: Negative for congestion, ear pain, hearing loss, rhinorrhea and sore throat.    Eyes: Negative for pain, discharge and redness.   Respiratory: Positive for apnea. Negative for cough, shortness of breath, wheezing and stridor.    Cardiovascular: Negative for chest pain, palpitations and leg swelling.   Gastrointestinal: Negative for abdominal pain, constipation, nausea and vomiting.   Endocrine: Negative for cold intolerance, heat intolerance and polyphagia.   Genitourinary: Negative for dysuria, flank pain, frequency and urgency.   Musculoskeletal: Negative for joint swelling, myalgias, neck pain and neck stiffness.   Skin: Negative for pallor, rash and wound.   Allergic/Immunologic: Negative for environmental allergies.   Neurological: Negative for dizziness, tremors, seizures, syncope, facial asymmetry, speech difficulty, weakness, light-headedness, numbness and headaches.   Hematological: Negative for adenopathy.   Psychiatric/Behavioral: Positive for sleep disturbance. Negative for confusion and hallucinations. The patient is not nervous/anxious.        Physical Exam     Vitals:    12/05/18 0934   BP: 138/70   Pulse: 70   SpO2: 99%   Weight: 118 kg (260 lb)   Height: 180.3 cm (71\")     GENERAL: Patient is pleasant, cooperative, appears to be stated age.  Body habitus is endomorphic.  SKIN AND EXTREMITIES:  No skin rashes or lesions are noted.  No cyanosis, clubbing or edema of the extremities.    HEAD:  Head is normocephalic and atraumatic.    NECK: Neck are non-tender without thyromegaly or adenopathy.  Carotic upstrokes are 1+/4.  No cranial or cervical bruits.  The neck is supple with a full range of motion.   ENT: palate elevate symmetrically, no evidence of high arch palate, tongue midline erythema in " posterior pharynx, Mallampati Classification Class III   CARDIOVASCULAR:  Regular rate and rhythm with normal S1 and S2 without rub or gallop.  RESPIRATORY:  Clear to auscultation without wheezes or crackle   ABDOMEN:  Soft and non-tender, positive bowel sound without hepatosplenomegaly  BACK:  Back is straight without midline defect.    PSYCH:  Higher cortical function/mental status:  The patient is alert.  She is oriented x3 to time, place and person.  Recent and the remote memory appear normal.  The patient has a good fund of knowledge.  There is no visual or auditory hallucination or suicidal or homicidal ideation.  SPEECH:There is no gross evidence of aphasia, dysarthria or agnosia.      CRANIAL NERVES:  Pupils are 4mm, equal round reactive to light, reacting briskly to 2mm without afferent pupillary defect.  Visual fields are intact to confrontation testing.  Fundoscopic examination reveals sharp disk margins with normal vasculature.  No papilledema, hemorrhages or exudates.  Extraocular movements are full and smooth with normal pursuits and saccades.  No nystagmus noted.  The face is symmetric. palate elevate symmetrically, Tongue midline, positive gag reflex. The remainder of the cranial nerves are intact and symmetrical.    MOTOR: Strength is 5/5 throughout with normal tone and bulk with the following exceptions, 4/5 intrinsic muscles of the hands and feet.  No involuntary movements noted.    Deep Tendon Reflexes: are 2/4 and symmetrical in the upper extremities, 2/4 and symmetrical at the knees and 1/4 and symmetrical at the Achilles tendon.  Plantar responses were down-going bilaterally.    SENSATION:  Intact to pinprick, light touch, vibration and proprioception.  Coordination:  The patient normally performs finger-nose-finger, heel-to-knee-to-shin and rapid alternating movements in symmetrical fashion.    COORDINATION AND GAIT:  The patient walks with a narrow-based gait.  Patient is able to heel-toe  and tandem walk forward and backwards without difficulty.  Romberg and monopedal  Romberg are negative.    MUSCULOSKELETAL: Range of motion normal, no clubbing, cyanosis, or edema.  No joint swelling.            Records Reviewed: I have personally reviewed his previous medical record.    Gonzalo was seen today for sleeping problem and pain.    Diagnoses and all orders for this visit:    Obstructive sleep apnea    Excessive daytime sleepiness    Nocturnal oxygen desaturation    Restless legs syndrome (RLS)    Periodic limb movement disorder (PLMD)    Primary narcolepsy without cataplexy        Treatments:  1.  Will schedule patient to have nasal BIPAP titration followed by multiple sleep latency test to rule out   narcolepsy  2.  Encourage regular sleep wake schedule  3.  Check ferritin level since Moran level less than 75 increased risk for augmentation and worsening of restless leg symptoms  4.  Encourage compliance with nasal BiPAP  Discussion:  I have counseled patient extensively on the pathophysiology of Restless Legs Syndrome(RLS).  I have explained to the patient how D3 receptor dysfunction can give rise to RLS.  We have also discussed how iron deficiency can contribute to RLS.   Nevertheless, the first line of defense against restless legs syndrome is to avoid substances or foods that may be causing or worsening the problem such as alcohol, caffeine , and nicotine. In addition, we have reviewed all medications could exacerbate the problem and checking Ferritin level today.      I have also reviewed with my patient any underlying medical conditions that could give rise to secondary RLS, such as anemia , diabetes , nutritional deficiencies, kidney disease, thyroid  disease, varicose veins, or Parkinson's disease.     We have discussed FDA approved pharmacologic intervention such as dopamine agonists.  These are most often the first line treatment used to treat RLS including Mirapex (pramipexole), Neupro patch  (rotigotine), and Requip (ropinirole). I have also counseled patient on side effects of Dopamine agonists include daytime sleepiness, nausea, hypotension and lightheadedness.    We have also discussed the use of non-dopaminergic medication such as Horizant (gabapentin enacarbil), as an adjunct to relieve the symptoms of RLS especially painful RLS symptoms.      This Document is signed by Kenneth Chan MD, FAAN, FAASM   December 5, 201810:42 AM

## 2018-12-18 RX ORDER — SPIRONOLACTONE 25 MG/1
TABLET ORAL
Qty: 90 TABLET | Refills: 1 | Status: SHIPPED | OUTPATIENT
Start: 2018-12-18

## 2018-12-19 ENCOUNTER — HOSPITAL ENCOUNTER (OUTPATIENT)
Dept: SLEEP MEDICINE | Facility: HOSPITAL | Age: 63
Setting detail: THERAPIES SERIES
End: 2018-12-19
Attending: PSYCHIATRY & NEUROLOGY

## 2018-12-19 DIAGNOSIS — G47.19 EXCESSIVE DAYTIME SLEEPINESS: ICD-10-CM

## 2018-12-19 DIAGNOSIS — G47.33 OBSTRUCTIVE SLEEP APNEA: ICD-10-CM

## 2018-12-19 DIAGNOSIS — G25.81 RESTLESS LEGS SYNDROME (RLS): ICD-10-CM

## 2018-12-19 DIAGNOSIS — G47.34 NOCTURNAL OXYGEN DESATURATION: ICD-10-CM

## 2018-12-19 DIAGNOSIS — G47.61 PERIODIC LIMB MOVEMENT DISORDER (PLMD): ICD-10-CM

## 2018-12-19 DIAGNOSIS — F45.8 OTHER SOMATOFORM DISORDERS: ICD-10-CM

## 2018-12-19 DIAGNOSIS — G47.419 PRIMARY NARCOLEPSY WITHOUT CATAPLEXY: ICD-10-CM

## 2018-12-19 PROCEDURE — 95811 POLYSOM 6/>YRS CPAP 4/> PARM: CPT

## 2018-12-19 PROCEDURE — 95811 POLYSOM 6/>YRS CPAP 4/> PARM: CPT | Performed by: PSYCHIATRY & NEUROLOGY

## 2019-01-31 ENCOUNTER — OFFICE VISIT (OUTPATIENT)
Dept: NEUROLOGY | Facility: CLINIC | Age: 64
End: 2019-01-31

## 2019-01-31 VITALS
HEART RATE: 70 BPM | DIASTOLIC BLOOD PRESSURE: 74 MMHG | HEIGHT: 71 IN | BODY MASS INDEX: 36.26 KG/M2 | OXYGEN SATURATION: 99 % | SYSTOLIC BLOOD PRESSURE: 124 MMHG

## 2019-01-31 DIAGNOSIS — G47.19 EXCESSIVE DAYTIME SLEEPINESS: ICD-10-CM

## 2019-01-31 DIAGNOSIS — G47.33 OBSTRUCTIVE SLEEP APNEA: Primary | ICD-10-CM

## 2019-01-31 DIAGNOSIS — G47.34 NOCTURNAL OXYGEN DESATURATION: ICD-10-CM

## 2019-01-31 DIAGNOSIS — Z78.9 CPAP VENTILATION TREATMENT NOT TOLERATED: ICD-10-CM

## 2019-01-31 PROCEDURE — 99214 OFFICE O/P EST MOD 30 MIN: CPT | Performed by: PSYCHIATRY & NEUROLOGY

## 2019-01-31 RX ORDER — ESCITALOPRAM OXALATE 5 MG/1
5 TABLET ORAL DAILY
COMMUNITY
End: 2019-02-06 | Stop reason: HOSPADM

## 2019-02-05 ENCOUNTER — TELEPHONE (OUTPATIENT)
Dept: INTERNAL MEDICINE | Facility: CLINIC | Age: 64
End: 2019-02-05

## 2019-02-05 ENCOUNTER — OFFICE VISIT (OUTPATIENT)
Dept: INTERNAL MEDICINE | Facility: CLINIC | Age: 64
End: 2019-02-05

## 2019-02-05 VITALS
HEIGHT: 71 IN | RESPIRATION RATE: 16 BRPM | TEMPERATURE: 98.5 F | DIASTOLIC BLOOD PRESSURE: 74 MMHG | BODY MASS INDEX: 36.46 KG/M2 | SYSTOLIC BLOOD PRESSURE: 122 MMHG | HEART RATE: 76 BPM | WEIGHT: 260.4 LBS | OXYGEN SATURATION: 98 %

## 2019-02-05 DIAGNOSIS — F32.A DEPRESSION, UNSPECIFIED DEPRESSION TYPE: ICD-10-CM

## 2019-02-05 DIAGNOSIS — R82.90 ABNORMAL URINE ODOR: ICD-10-CM

## 2019-02-05 DIAGNOSIS — G25.81 RESTLESS LEG: Primary | ICD-10-CM

## 2019-02-05 LAB
BILIRUB BLD-MCNC: NEGATIVE MG/DL
CLARITY, POC: CLEAR
COLOR UR: YELLOW
GLUCOSE UR STRIP-MCNC: NEGATIVE MG/DL
KETONES UR QL: NEGATIVE
LEUKOCYTE EST, POC: NEGATIVE
NITRITE UR-MCNC: NEGATIVE MG/ML
PH UR: 5 [PH] (ref 5–8)
PROT UR STRIP-MCNC: NEGATIVE MG/DL
RBC # UR STRIP: NEGATIVE /UL
SP GR UR: 1.02 (ref 1–1.03)
UROBILINOGEN UR QL: NORMAL

## 2019-02-05 PROCEDURE — 81003 URINALYSIS AUTO W/O SCOPE: CPT | Performed by: PHYSICIAN ASSISTANT

## 2019-02-05 PROCEDURE — 99214 OFFICE O/P EST MOD 30 MIN: CPT | Performed by: PHYSICIAN ASSISTANT

## 2019-02-05 RX ORDER — CYCLOBENZAPRINE HCL 10 MG
10 TABLET ORAL 3 TIMES DAILY PRN
Qty: 90 TABLET | Refills: 3 | Status: SHIPPED | OUTPATIENT
Start: 2019-02-05

## 2019-02-05 RX ORDER — LISINOPRIL 20 MG/1
20 TABLET ORAL DAILY
COMMUNITY

## 2019-02-05 NOTE — TELEPHONE ENCOUNTER
Patient LVM at 8:59am requesting appt with Zamzam. LVM for patient to call back and schedule. Advised that Zamzam not in today but we do have PA's available.

## 2019-02-05 NOTE — PROGRESS NOTES
Chief Complaint   Patient presents with   • Urinary Tract Infection     Patients c/o foul odor when urinating X 1 month   • Restless Legs Syndrome     Pt currently treats with gabapentin and mirapex. Pt states that he had tried to lower his percocet because he started lexapro and states that lowering his oxycodone has worsened his RLS, he states that Dr. Chan has taken him off of the lexapro.        Subjective   Gonzalo Oates is a 63 y.o. male    History of Present Illness     RLS:  Patient has chronic history of restless leg syndrome.  He is being follow by Dr. Chan for this.  Dr. Chan has counseled patient about substances to avoid that may worsen his RLS symptoms.  He ha also discussed underlying medical conditions that may contribute to the disease.  Patient is currently taking Mirapex, Gabapentin, and Percocet for his RLS symptoms.  Dr. Chan has discussed additional pharmacologic interventions with patient.  Patient reports that he was advised to discontinue his Lexapro, as it could be contributing to his symptoms.  He has been off of his lexapro for 1 week.  He denies any depression or SI/HI.  He reports that he has not had any relief of his RLS symptoms.  He would like to begin Neupro patches for his symptoms.  In the past patient used the Neupro patches along with Mirapex, and reports better symptom control.  He states that he has been avoiding caffeine, alcohol, and nicotine.  He did not have his labs completed as requested by Dr. Chan, because he states that he has had these labs completed in the past and nothing was wrong.  He conveys frustration today in office, due to his symptoms he is unable to sit for any amount of time.      Urinary Problems:  Patient reports that his urine has foul odor for about 1 month.  He denies any problems voiding, dysuria, urgency, hematuria, testicular pain.  He does not drink much water.  He does not have any new medications.  Denies any new sexual partners.      Past  Medical History:   Diagnosis Date   • Ankle fracture     left 02/2015   • Anxiety    • Arthritis    • Asthma    • BPH (benign prostatic hyperplasia)    • Cataract    • Chronic back pain    • Chronic sinusitis    • Colon polyps    • Constipation     OPIOD INDUCED   • CTS (carpal tunnel syndrome)    • Deep vein thrombosis (DVT) of iliac vein (CMS/HCC)    • Depression    • Diabetes mellitus (CMS/HCC)     PRE DIABETES NO MEDS   • Difficulty walking    • Erectile dysfunction    • GERD (gastroesophageal reflux disease)    • Headache, tension-type    • Herniated disc, cervical    • HL (hearing loss)    • Hyperlipidemia    • Hypertension    • IBS (irritable bowel syndrome)    • Left knee pain    • Lumbar herniated disc    • Memory loss    • Migraine headache    • Mitral valve prolapse    • Movement disorder    • Narcolepsy    • Obesity    • KO (obstructive sleep apnea)     non compliant with CPAP    • Peripheral vascular disease (CMS/HCC)    • Prediabetes    • Restless leg     supressed with narcotics    • Sleep apnea    • Snoring    • SOB (shortness of breath)    • Umbilical hernia    • UTI (urinary tract infection)    • Varicose veins    • Venous insufficiency    • Wears glasses      Past Surgical History:   Procedure Laterality Date   • APPENDECTOMY     • BACK SURGERY     • BACK SURGERY     • CEREBRAL ANGIOGRAM     • FRACTURE SURGERY      LEFT ANKLE   • KNEE ARTHROSCOPY Left 6/27/2018    Procedure: diagnostic arthroscopy partial medial meniscectomy or other procedures as necessary, left knee;  Surgeon: Tj Hoffman MD;  Location: [facility];  Service: Orthopedics   • LAMINECTOMY     • SINUS SURGERY     • TONSILLECTOMY     • ULNAR NERVE TRANSPOSITION     • VASCULAR SURGERY     • VASECTOMY     • WISDOM TOOTH EXTRACTION       Family History   Problem Relation Age of Onset   • Cancer Other    • Diabetes Other    • Heart disease Other    • Hypertension Other    • Endocrine tumor Mother    • Anxiety disorder Mother    •  Arthritis Mother    • Hypertension Mother    • Seizures Mother         Due to brain surgery   • Heart attack Father    • Hypertension Father    • Glaucoma Father    • Cancer Maternal Grandmother    • Heart attack Paternal Grandfather    • Hypertension Brother    • Neuropathy Brother    • Hypertension Sister    • Migraines Sister      Social History     Socioeconomic History   • Marital status:      Spouse name: Not on file   • Number of children: Not on file   • Years of education: Not on file   • Highest education level: Not on file   Social Needs   • Financial resource strain: Not on file   • Food insecurity - worry: Not on file   • Food insecurity - inability: Not on file   • Transportation needs - medical: Not on file   • Transportation needs - non-medical: Not on file   Occupational History   • Not on file   Tobacco Use   • Smoking status: Never Smoker   • Smokeless tobacco: Never Used   • Tobacco comment: Never used.   Substance and Sexual Activity   • Alcohol use: Yes     Types: 3 Shots of liquor per week     Comment: per week   • Drug use: No   • Sexual activity: No   Other Topics Concern   • Not on file   Social History Narrative   • Not on file     Allergies   Allergen Reactions   • Calcium Channel Blockers Swelling     Lower extremity edema       • Adhesive Tape Rash   • Chlorhexidine Rash   • Nickel Swelling   • Zinc Oxide Rash and Unknown (See Comments)     SENSITIVITY ,CAUSES RASH       Review of Systems   Constitutional: Positive for activity change and fatigue. Negative for appetite change, chills and fever.   Respiratory: Negative for choking and shortness of breath.    Cardiovascular: Negative for chest pain, palpitations and leg swelling.   Gastrointestinal: Negative for abdominal pain, constipation, diarrhea, nausea and vomiting.   Genitourinary: Negative for urinary incontinence, decreased urine volume, flank pain, frequency, testicular pain and urgency.        Urine odor.    Musculoskeletal: Negative for back pain.   Neurological: Negative for dizziness, weakness and light-headedness.     Objective     Vitals:    02/05/19 1027   BP: 122/74   Pulse: 76   Resp: 16   Temp: 98.5 °F (36.9 °C)   SpO2: 98%       Physical Exam   Constitutional: He is oriented to person, place, and time. He appears well-developed and well-nourished. No distress.   HENT:   Head: Normocephalic and atraumatic.   Eyes: Conjunctivae and EOM are normal. Pupils are equal, round, and reactive to light.   Neck: Normal range of motion. Neck supple.   Cardiovascular: Normal rate, regular rhythm and normal heart sounds. Exam reveals no gallop and no friction rub.   No murmur heard.  Pulmonary/Chest: Effort normal and breath sounds normal. No respiratory distress. He has no wheezes. He has no rales.   Abdominal: Soft. Bowel sounds are normal. He exhibits no distension. There is generalized tenderness.   Musculoskeletal: Normal range of motion. He exhibits no edema.   Neurological: He is alert and oriented to person, place, and time.   Reflex Scores:       Patellar reflexes are 2+ on the right side and 2+ on the left side.  Skin: Skin is warm and dry. Capillary refill takes less than 2 seconds. He is not diaphoretic.   Psychiatric: He has a normal mood and affect. His behavior is normal.   Nursing note and vitals reviewed.      Results for orders placed or performed in visit on 02/05/19   POC Urinalysis Dipstick, Automated   Result Value Ref Range    Color Yellow Yellow, Straw, Dark Yellow, Jeny    Clarity, UA Clear Clear    Specific Gravity  1.020 1.005 - 1.030    pH, Urine 5.0 5.0 - 8.0    Leukocytes Negative Negative    Nitrite, UA Negative Negative    Protein, POC Negative Negative mg/dL    Glucose, UA Negative Negative, 1000 mg/dL (3+) mg/dL    Ketones, UA Negative Negative    Urobilinogen, UA Normal Normal    Bilirubin Negative Negative    Blood, UA Negative Negative       Assessment/Plan     Gonzalo was seen today  for urinary tract infection and restless legs syndrome.    Diagnoses and all orders for this visit:    Restless leg  -     Consulted with ZAHIDA Coronado; patient has been on Neupro patches in the past.  He was placed on these by a previous physician.  He reports that they were discontinued due to price.  There is potential for Neupro to cause drowsiness when used in conjunction with other medications such as Mirapex.  Patient has been advised to use these with caution and to refrain from driving if drowsiness occurs.  Patient verbalizes understanding and is in agreement with plan.  He has also been encouraged to contact Dr. Chan in regards to his RLS and maintain scheduled follow up.  -     rotigotine (NEUPRO) 2 MG/24HR patch; Place 1 patch on the skin as directed by provider Daily.    Abnormal urine odor  -     Ambulatory Referral to Urology  -     Urine Culture - Urine, Urine, Clean Catch  -     POC Urinalysis Dipstick, Automated    Depression, unspecified depression type        -     Patient has chronic history of depression.   He was recently advised to discontinue Lexapro, as this could be contributory to his RLS symptoms.  He has continued taking Cymbalta.  He denies any current SI/HI or increased depression.  Patient has been to behavioral health services in the past.  I have advised him to schedule an appointment with them, to discuss other adjunct medications that he can take that will not interfere with RLS symptoms.  Patient is agreeable, and will schedule an appointment.     Other orders  -     cyclobenzaprine (FLEXERIL) 10 MG tablet; Take 1 tablet by mouth 3 (Three) Times a Day As Needed for Muscle Spasms.      Return if symptoms worsen or fail to improve.    Angela Combs PA-C

## 2019-02-06 ENCOUNTER — HOSPITAL ENCOUNTER (EMERGENCY)
Facility: HOSPITAL | Age: 64
Discharge: HOME | End: 2019-02-06
Attending: EMERGENCY MEDICINE | Admitting: EMERGENCY MEDICINE

## 2019-02-06 ENCOUNTER — APPOINTMENT (OUTPATIENT)
Dept: CT IMAGING | Facility: HOSPITAL | Age: 64
End: 2019-02-06

## 2019-02-06 ENCOUNTER — APPOINTMENT (OUTPATIENT)
Dept: GENERAL RADIOLOGY | Facility: HOSPITAL | Age: 64
End: 2019-02-06

## 2019-02-06 VITALS
RESPIRATION RATE: 16 BRPM | HEART RATE: 74 BPM | OXYGEN SATURATION: 95 % | BODY MASS INDEX: 36.4 KG/M2 | TEMPERATURE: 98.8 F | HEIGHT: 71 IN | WEIGHT: 260 LBS | SYSTOLIC BLOOD PRESSURE: 122 MMHG | DIASTOLIC BLOOD PRESSURE: 83 MMHG

## 2019-02-06 DIAGNOSIS — G25.81 RESTLESS LEG SYNDROME: Primary | ICD-10-CM
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
## 2019-02-06 DIAGNOSIS — G89.4 CHRONIC PAIN SYNDROME: ICD-10-CM

## 2019-02-06 DIAGNOSIS — S09.90XA INJURY OF HEAD, INITIAL ENCOUNTER: ICD-10-CM

## 2019-02-06 LAB
ALBUMIN SERPL-MCNC: 3.7 G/DL (ref 3.5–5)
ALBUMIN/GLOB SERPL: 1.5 G/DL (ref 1–2)
ALP SERPL-CCNC: 106 U/L (ref 38–126)
ALT SERPL W P-5'-P-CCNC: 52 U/L (ref 13–69)
AMPHET+METHAMPHET UR QL: NEGATIVE
AMPHETAMINES UR QL: NEGATIVE
ANION GAP SERPL CALCULATED.3IONS-SCNC: 7 MMOL/L (ref 10–20)
ANISOCYTOSIS BLD QL: NORMAL
APAP SERPL-MCNC: <10 MCG/ML
AST SERPL-CCNC: 67 U/L (ref 15–46)
BARBITURATES UR QL SCN: POSITIVE
BASOPHILS # BLD AUTO: 0.03 10*3/MM3 (ref 0–0.2)
BASOPHILS NFR BLD AUTO: 0.6 % (ref 0–2.5)
BENZODIAZ UR QL SCN: NEGATIVE
BILIRUB SERPL-MCNC: 0.2 MG/DL (ref 0.2–1.3)
BILIRUB UR QL STRIP: NEGATIVE
BUN BLD-MCNC: 20 MG/DL (ref 7–20)
BUN/CREAT SERPL: 25 (ref 6.3–21.9)
BUPRENORPHINE SERPL-MCNC: NEGATIVE NG/ML
CALCIUM SPEC-SCNC: 8.8 MG/DL (ref 8.4–10.2)
CANNABINOIDS SERPL QL: NEGATIVE
CHLORIDE SERPL-SCNC: 111 MMOL/L (ref 98–107)
CLARITY UR: CLEAR
CO2 SERPL-SCNC: 27 MMOL/L (ref 26–30)
COCAINE UR QL: NEGATIVE
COLOR UR: YELLOW
CREAT BLD-MCNC: 0.8 MG/DL (ref 0.6–1.3)
DACRYOCYTES BLD QL SMEAR: NORMAL
DEPRECATED RDW RBC AUTO: 45.9 FL (ref 37–54)
ELLIPTOCYTES BLD QL SMEAR: NORMAL
EOSINOPHIL # BLD AUTO: 0.19 10*3/MM3 (ref 0–0.7)
EOSINOPHIL NFR BLD AUTO: 3.9 % (ref 0–7)
ERYTHROCYTE [DISTWIDTH] IN BLOOD BY AUTOMATED COUNT: 17 % (ref 11.5–14.5)
ETHANOL BLD-MCNC: <10 MG/DL
ETHANOL UR QL: <0.01 %
GFR SERPL CREATININE-BSD FRML MDRD: 98 ML/MIN/1.73
GLOBULIN UR ELPH-MCNC: 2.5 GM/DL
GLUCOSE BLD-MCNC: 98 MG/DL (ref 74–98)
GLUCOSE UR STRIP-MCNC: NEGATIVE MG/DL
HCT VFR BLD AUTO: 28.7 % (ref 42–52)
HGB BLD-MCNC: 8.5 G/DL (ref 14–18)
HGB UR QL STRIP.AUTO: NEGATIVE
HYPOCHROMIA BLD QL: NORMAL
IMM GRANULOCYTES # BLD AUTO: 0.02 10*3/MM3 (ref 0–0.06)
IMM GRANULOCYTES NFR BLD AUTO: 0.4 % (ref 0–0.6)
KETONES UR QL STRIP: NEGATIVE
LEUKOCYTE ESTERASE UR QL STRIP.AUTO: NEGATIVE
LIPASE SERPL-CCNC: 148 U/L (ref 23–300)
LYMPHOCYTES # BLD AUTO: 0.98 10*3/MM3 (ref 0.6–3.4)
LYMPHOCYTES NFR BLD AUTO: 20.3 % (ref 10–50)
MCH RBC QN AUTO: 22.4 PG (ref 27–31)
MCHC RBC AUTO-ENTMCNC: 29.6 G/DL (ref 30–37)
MCV RBC AUTO: 75.7 FL (ref 80–94)
METHADONE UR QL SCN: NEGATIVE
MICROCYTES BLD QL: NORMAL
MONOCYTES # BLD AUTO: 0.4 10*3/MM3 (ref 0–0.9)
MONOCYTES NFR BLD AUTO: 8.3 % (ref 0–12)
NEUTROPHILS # BLD AUTO: 3.21 10*3/MM3 (ref 2–6.9)
NEUTROPHILS NFR BLD AUTO: 66.5 % (ref 37–80)
NITRITE UR QL STRIP: NEGATIVE
NRBC BLD AUTO-RTO: 0 /100 WBC (ref 0–0)
OPIATES UR QL: NEGATIVE
OXYCODONE UR QL SCN: POSITIVE
PCP UR QL SCN: NEGATIVE
PH UR STRIP.AUTO: 5.5 [PH] (ref 5–8)
PLATELET # BLD AUTO: 204 10*3/MM3 (ref 130–400)
PMV BLD AUTO: 9.4 FL (ref 6–12)
POIKILOCYTOSIS BLD QL SMEAR: NORMAL
POLYCHROMASIA BLD QL SMEAR: NORMAL
POTASSIUM BLD-SCNC: 4 MMOL/L (ref 3.5–5.1)
PROPOXYPH UR QL: NEGATIVE
PROT SERPL-MCNC: 6.2 G/DL (ref 6.3–8.2)
PROT UR QL STRIP: NEGATIVE
RBC # BLD AUTO: 3.79 10*6/MM3 (ref 4.7–6.1)
SALICYLATES SERPL-MCNC: <1 MG/DL (ref 2.8–20)
SMALL PLATELETS BLD QL SMEAR: ADEQUATE
SODIUM BLD-SCNC: 141 MMOL/L (ref 137–145)
SP GR UR STRIP: 1.02 (ref 1–1.03)
TRICYCLICS UR QL SCN: NEGATIVE
TROPONIN I SERPL-MCNC: <0.012 NG/ML (ref 0–0.03)
TSH SERPL DL<=0.05 MIU/L-ACNC: 1.06 MIU/ML (ref 0.47–4.68)
UROBILINOGEN UR QL STRIP: NORMAL
WBC MORPH BLD: NORMAL
WBC NRBC COR # BLD: 4.83 10*3/MM3 (ref 4.8–10.8)

## 2019-02-06 PROCEDURE — 80306 DRUG TEST PRSMV INSTRMNT: CPT | Performed by: PHYSICIAN ASSISTANT

## 2019-02-06 PROCEDURE — 85007 BL SMEAR W/DIFF WBC COUNT: CPT | Performed by: PHYSICIAN ASSISTANT

## 2019-02-06 PROCEDURE — 96361 HYDRATE IV INFUSION ADD-ON: CPT

## 2019-02-06 PROCEDURE — 25010000002 MORPHINE PER 10 MG: Performed by: EMERGENCY MEDICINE

## 2019-02-06 PROCEDURE — 70450 CT HEAD/BRAIN W/O DYE: CPT

## 2019-02-06 PROCEDURE — 80307 DRUG TEST PRSMV CHEM ANLYZR: CPT | Performed by: PHYSICIAN ASSISTANT

## 2019-02-06 PROCEDURE — 85025 COMPLETE CBC W/AUTO DIFF WBC: CPT | Performed by: PHYSICIAN ASSISTANT

## 2019-02-06 PROCEDURE — 73522 X-RAY EXAM HIPS BI 3-4 VIEWS: CPT

## 2019-02-06 PROCEDURE — 80053 COMPREHEN METABOLIC PANEL: CPT | Performed by: PHYSICIAN ASSISTANT

## 2019-02-06 PROCEDURE — 84484 ASSAY OF TROPONIN QUANT: CPT | Performed by: PHYSICIAN ASSISTANT

## 2019-02-06 PROCEDURE — 25010000002 DIAZEPAM PER 5 MG: Performed by: EMERGENCY MEDICINE

## 2019-02-06 PROCEDURE — 96375 TX/PRO/DX INJ NEW DRUG ADDON: CPT

## 2019-02-06 PROCEDURE — 93005 ELECTROCARDIOGRAM TRACING: CPT | Performed by: PHYSICIAN ASSISTANT

## 2019-02-06 PROCEDURE — 83690 ASSAY OF LIPASE: CPT | Performed by: PHYSICIAN ASSISTANT

## 2019-02-06 PROCEDURE — 96374 THER/PROPH/DIAG INJ IV PUSH: CPT

## 2019-02-06 PROCEDURE — 72125 CT NECK SPINE W/O DYE: CPT

## 2019-02-06 PROCEDURE — 99285 EMERGENCY DEPT VISIT HI MDM: CPT

## 2019-02-06 PROCEDURE — 84443 ASSAY THYROID STIM HORMONE: CPT | Performed by: PHYSICIAN ASSISTANT

## 2019-02-06 PROCEDURE — 81003 URINALYSIS AUTO W/O SCOPE: CPT | Performed by: PHYSICIAN ASSISTANT

## 2019-02-06 RX ORDER — GABAPENTIN 100 MG/1
300 CAPSULE ORAL ONCE
Status: COMPLETED | OUTPATIENT
Start: 2019-02-06 | End: 2019-02-06

## 2019-02-06 RX ORDER — OXYCODONE HYDROCHLORIDE AND ACETAMINOPHEN 5; 325 MG/1; MG/1
0.5 TABLET ORAL ONCE
Status: COMPLETED | OUTPATIENT
Start: 2019-02-06 | End: 2019-02-06

## 2019-02-06 RX ORDER — GABAPENTIN 100 MG/1
300 CAPSULE ORAL ONCE
Status: DISCONTINUED | OUTPATIENT
Start: 2019-02-06 | End: 2019-02-06 | Stop reason: HOSPADM

## 2019-02-06 RX ORDER — CLONAZEPAM 0.5 MG/1
1 TABLET ORAL ONCE
Status: COMPLETED | OUTPATIENT
Start: 2019-02-06 | End: 2019-02-06

## 2019-02-06 RX ORDER — SODIUM CHLORIDE 0.9 % (FLUSH) 0.9 %
10 SYRINGE (ML) INJECTION AS NEEDED
Status: DISCONTINUED | OUTPATIENT
Start: 2019-02-06 | End: 2019-02-06 | Stop reason: HOSPADM

## 2019-02-06 RX ORDER — MORPHINE SULFATE 4 MG/ML
4 INJECTION, SOLUTION INTRAMUSCULAR; INTRAVENOUS ONCE
Status: COMPLETED | OUTPATIENT
Start: 2019-02-06 | End: 2019-02-06

## 2019-02-06 RX ORDER — DIAZEPAM 5 MG/ML
2 INJECTION, SOLUTION INTRAMUSCULAR; INTRAVENOUS ONCE
Status: COMPLETED | OUTPATIENT
Start: 2019-02-06 | End: 2019-02-06

## 2019-02-06 RX ORDER — DIPHENHYDRAMINE HYDROCHLORIDE 50 MG/ML
12.5 INJECTION INTRAMUSCULAR; INTRAVENOUS ONCE
Status: DISCONTINUED | OUTPATIENT
Start: 2019-02-06 | End: 2019-02-06

## 2019-02-06 RX ORDER — OXYCODONE HYDROCHLORIDE AND ACETAMINOPHEN 5; 325 MG/1; MG/1
1 TABLET ORAL ONCE
Status: DISCONTINUED | OUTPATIENT
Start: 2019-02-06 | End: 2019-02-06

## 2019-02-06 RX ORDER — ONDANSETRON 2 MG/ML
4 INJECTION INTRAMUSCULAR; INTRAVENOUS ONCE
Status: DISCONTINUED | OUTPATIENT
Start: 2019-02-06 | End: 2019-02-06 | Stop reason: HOSPADM

## 2019-02-06 RX ADMIN — MORPHINE SULFATE 4 MG: 4 INJECTION, SOLUTION INTRAMUSCULAR; INTRAVENOUS at 14:42

## 2019-02-06 RX ADMIN — CLONAZEPAM 1 MG: 0.5 TABLET ORAL at 11:41

## 2019-02-06 RX ADMIN — Medication 2 MG: at 11:05

## 2019-02-06 RX ADMIN — SODIUM CHLORIDE 1000 ML: 9 INJECTION, SOLUTION INTRAVENOUS at 11:11

## 2019-02-06 RX ADMIN — OXYCODONE HYDROCHLORIDE AND ACETAMINOPHEN 0.5 TABLET: 5; 325 TABLET ORAL at 13:48

## 2019-02-06 RX ADMIN — GABAPENTIN 300 MG: 100 CAPSULE ORAL at 13:48

## 2019-02-06 NOTE — ED NOTES
Notified CT at this time, pt ready to attempts CT scan 2nd time     Nitza Aragon, RN  02/06/19 2081

## 2019-02-06 NOTE — DISCHARGE INSTRUCTIONS
Make sure you have your prescription filled that was written by Dr. Chan for restless leg syndrome continue other medications

## 2019-02-06 NOTE — ED NOTES
"Upon medication administration pt states \"I don't want that, I was taking more at home, just get me out of here\". EZRA Baerd notified and at bs to discuss POC with patient   Nitza Aragon, RN  02/06/19 5536       Nitza Aragon, RN  02/06/19 4571    "

## 2019-02-06 NOTE — ED NOTES
Pt brought back from radiology, unable to obtain CT at this time, pt unable to sit still. Provider notified.     Nitza Aragon, RN  02/06/19 9834

## 2019-02-06 NOTE — PROGRESS NOTES
In the future, you may want to discuss with the patient he should not take cymbalta with another SSRI or SNRI due to increased risk of serotonin syndrome.  Hopefully that was just a pharmacy error?  He may have been told in the past to stop one of them.

## 2019-02-06 NOTE — ED PROVIDER NOTES
"Subjective   The patient is here with complaint of restless leg syndrome,.. This is not a new event this is\" been going on for weeks\" ...increased\" twitching and turning \" describes it from last night states he feels he fell 6 or 7 times with his symptoms ..... No syncope reported these are described as mechanical falls ....feels he did hit his head one of these falls no chest pain or shortness of air no abdominal pain no nausea vomiting he does endorse history of anxiety depression restless leg syndrome as well as chronic back pain and history of cervical fusion... Does not endorse any radiculopathy no bowel or bladder dysfunction.. No abdominal pain no systemic complaints patient apparently has been seen by Dr. Chan possible new medication change for his RLS.. Patient has prescription to start new medication has not filled it yet.. His pain medication does help with his symptoms but he has not had any apparently since last night        History provided by:  Patient      Review of Systems   Constitutional: Negative.  Negative for chills and fever.   HENT: Negative.    Eyes: Negative.    Respiratory: Negative.  Negative for shortness of breath.    Cardiovascular: Negative.  Negative for chest pain.   Gastrointestinal: Negative.  Negative for abdominal pain and nausea.   Musculoskeletal: Positive for arthralgias. Negative for back pain.   Skin: Negative.    Neurological: Positive for dizziness. Negative for speech difficulty, weakness and numbness.        No bowel or bladder dysfunction no radiculopathy no saddle anesthesia   Psychiatric/Behavioral: Negative.    All other systems reviewed and are negative.      Past Medical History:   Diagnosis Date   • Ankle fracture     left 02/2015   • Anxiety    • Arthritis    • Asthma    • BPH (benign prostatic hyperplasia)    • Cataract    • Chronic back pain    • Chronic sinusitis    • Colon polyps    • Constipation     OPIOD INDUCED   • CTS (carpal tunnel syndrome)    • Deep " vein thrombosis (DVT) of iliac vein (CMS/HCC)    • Depression    • Diabetes mellitus (CMS/HCC)     PRE DIABETES NO MEDS   • Difficulty walking    • Erectile dysfunction    • GERD (gastroesophageal reflux disease)    • Headache, tension-type    • Herniated disc, cervical    • HL (hearing loss)    • Hyperlipidemia    • Hypertension    • IBS (irritable bowel syndrome)    • Left knee pain    • Lumbar herniated disc    • Memory loss    • Migraine headache    • Mitral valve prolapse    • Movement disorder    • Narcolepsy    • Obesity    • KO (obstructive sleep apnea)     non compliant with CPAP    • Peripheral vascular disease (CMS/HCC)    • Prediabetes    • Restless leg     supressed with narcotics    • Sleep apnea    • Snoring    • SOB (shortness of breath)    • Umbilical hernia    • UTI (urinary tract infection)    • Varicose veins    • Venous insufficiency    • Wears glasses        Allergies   Allergen Reactions   • Calcium Channel Blockers Swelling     Lower extremity edema       • Adhesive Tape Rash   • Chlorhexidine Rash   • Nickel Swelling   • Zinc Oxide Rash and Unknown (See Comments)     SENSITIVITY ,CAUSES RASH         Past Surgical History:   Procedure Laterality Date   • APPENDECTOMY     • BACK SURGERY     • BACK SURGERY     • CEREBRAL ANGIOGRAM     • FRACTURE SURGERY      LEFT ANKLE   • KNEE ARTHROSCOPY Left 6/27/2018    Procedure: diagnostic arthroscopy partial medial meniscectomy or other procedures as necessary, left knee;  Surgeon: Tj Hoffman MD;  Location: [facility];  Service: Orthopedics   • LAMINECTOMY     • SINUS SURGERY     • TONSILLECTOMY     • ULNAR NERVE TRANSPOSITION     • VASCULAR SURGERY     • VASECTOMY     • WISDOM TOOTH EXTRACTION         Family History   Problem Relation Age of Onset   • Cancer Other    • Diabetes Other    • Heart disease Other    • Hypertension Other    • Endocrine tumor Mother    • Anxiety disorder Mother    • Arthritis Mother    • Hypertension Mother    • Seizures  "Mother         Due to brain surgery   • Heart attack Father    • Hypertension Father    • Glaucoma Father    • Cancer Maternal Grandmother    • Heart attack Paternal Grandfather    • Hypertension Brother    • Neuropathy Brother    • Hypertension Sister    • Migraines Sister        Social History     Socioeconomic History   • Marital status:      Spouse name: Not on file   • Number of children: Not on file   • Years of education: Not on file   • Highest education level: Not on file   Tobacco Use   • Smoking status: Never Smoker   • Smokeless tobacco: Never Used   • Tobacco comment: Never used.   Substance and Sexual Activity   • Alcohol use: Yes     Types: 3 Shots of liquor per week     Comment: per week   • Drug use: No   • Sexual activity: No           Objective   Physical Exam   Constitutional: He is oriented to person, place, and time. He appears well-developed and well-nourished.   Afebrile nontoxic no acute distress   HENT:   Head: Normocephalic and atraumatic.   Right Ear: External ear normal.   Left Ear: External ear normal.   Mouth/Throat: Oropharynx is clear and moist.   Eyes: Conjunctivae and EOM are normal. Pupils are equal, round, and reactive to light.   Neck: Neck supple.   Very minimal tenderness left paracervical trapezius muscles C6-C7 no T-spine or L-spine tenderness   Cardiovascular: Normal rate, regular rhythm, normal heart sounds and intact distal pulses.   Pulses:       Dorsalis pedis pulses are 1+ on the right side, and 1+ on the left side.   Pulmonary/Chest: Effort normal and breath sounds normal.   Abdominal: Soft. There is no tenderness. There is no guarding.   Musculoskeletal: Normal range of motion.   Patient moves upper and Motrin initially without hesitation pelvis stable nontender patient moving lower legs while I examine him, states \"I have restless leg syndrome \"  Of note there is no T-spine or L-spine tenderness   Lymphadenopathy:     He has no cervical adenopathy. "   Neurological: He is alert and oriented to person, place, and time. He has normal strength. No cranial nerve deficit or sensory deficit. He exhibits normal muscle tone. Coordination normal. GCS eye subscore is 4. GCS verbal subscore is 5. GCS motor subscore is 6.   Reflex Scores:       Patellar reflexes are 2+ on the right side and 2+ on the left side.  Skin: Skin is warm and dry. Capillary refill takes less than 2 seconds. No erythema.   Psychiatric: He has a normal mood and affect. His behavior is normal. Judgment and thought content normal.   Nursing note and vitals reviewed.      Procedures           ED Course  ED Course as of Feb 06 1536   Wed Feb 06, 2019   1108 EKG: Interpreted by me. NSR w/ nl intervals, occasional premature atrial complex. Nonspecific intraventricular conduction delay. No josesito/std. Abnormal EKG    [AI]   1135 Discussed patient case with Dr. Dr. Kenneth Chan recommends giving him Klonopin... Now for his restless leg syndrome as he continues to have symptoms... patient is to start his neupro which he does have prescription for  Followed by Dr. Kenneth Chan  [SC]   1151 Patient case labs management reviewed Dr Fagan  [SC]   1327 Patient states he has not taken his Percocet since yesterday evening will give him low dose here his legs have decreased with movement, decreased restless leg sx..  Still some slight movement patient requesting some of his pain medication, for his chronic pain with his back again as he has not taken it since yesterday evening feels this may help with his symptoms as well... Percocet and gabapentin.... Patient has been up walking some as well while in the ED  [SC]   1414 Patient is used to taking Percocet 10 mg an 800 mg I gabapentin we'll provide more medication than try to get CT scans done  [SC]   1431 Patient states his Percocet medication does help him but he has not had it since last night he would prefer morphine for quicker onset for his chronic pain after  discussing this with him will hold on the Percocet ultimately hopefully will be able to get his CT scans  [SC]   1531 Imaging studies reviewed by radiology no acute findings will plan on discharge home patient will get his medications per Dr. Chan follow-up with him as scheduled return here if is any problem with follow-up or any worsening symptoms or concerns  [SC]      ED Course User Index  [AI] Berto Fagan MD  [SC] Shayne Beard PA-C                  MDM  Number of Diagnoses or Management Options     Amount and/or Complexity of Data Reviewed  Clinical lab tests: ordered  Tests in the radiology section of CPT®: ordered  Tests in the medicine section of CPT®: ordered  Decide to obtain previous medical records or to obtain history from someone other than the patient: yes  Review and summarize past medical records: yes  Discuss the patient with other providers: yes    Risk of Complications, Morbidity, and/or Mortality  Presenting problems: moderate  Diagnostic procedures: low  Management options: moderate    Patient Progress  Patient progress: stable        Final diagnoses:   Restless leg syndrome   Fall against object   Chronic pain syndrome   Injury of head, initial encounter            Shayne Beard PA-C  02/06/19 1539

## 2019-02-07 ENCOUNTER — HOSPITAL ENCOUNTER (EMERGENCY)
Facility: HOSPITAL | Age: 64
Discharge: LEFT BEFORE DISCHARGE | End: 2019-02-07

## 2019-02-07 VITALS
HEART RATE: 86 BPM | RESPIRATION RATE: 19 BRPM | HEIGHT: 73 IN | DIASTOLIC BLOOD PRESSURE: 69 MMHG | SYSTOLIC BLOOD PRESSURE: 124 MMHG | WEIGHT: 260 LBS | OXYGEN SATURATION: 97 % | TEMPERATURE: 98.4 F | BODY MASS INDEX: 34.46 KG/M2

## 2019-02-07 LAB
BACTERIA UR CULT: NO GROWTH
BACTERIA UR CULT: NORMAL

## 2019-02-07 PROCEDURE — 99211 OFF/OP EST MAY X REQ PHY/QHP: CPT

## 2019-02-07 NOTE — ED NOTES
"Pt approached nurses states states \"I'm leaving, I've got a pain level of 6 and I haven't seen a doctor yet, I'm leaving\" educated patient on flow of ed, and patients seen based on acuity. explained to patient we could  Contact the doctor, pt requests to leave.      Mckayla Hendrix, RN  02/07/19 0978    "

## 2019-02-12 RX ORDER — CLONAZEPAM 0.5 MG/1
0.5 TABLET ORAL 2 TIMES DAILY PRN
Qty: 60 TABLET | Refills: 0 | Status: SHIPPED | OUTPATIENT
Start: 2019-02-12

## 2019-02-28 DIAGNOSIS — M25.512 ARTHRALGIA OF LEFT SHOULDER REGION: Primary | ICD-10-CM

## 2019-03-01 ENCOUNTER — OFFICE VISIT (OUTPATIENT)
Dept: NEUROLOGY | Facility: CLINIC | Age: 64
End: 2019-03-01

## 2019-03-01 VITALS
HEIGHT: 73 IN | HEART RATE: 81 BPM | SYSTOLIC BLOOD PRESSURE: 126 MMHG | BODY MASS INDEX: 34.3 KG/M2 | DIASTOLIC BLOOD PRESSURE: 62 MMHG | OXYGEN SATURATION: 98 %

## 2019-03-01 DIAGNOSIS — R25.1 TREMOR: Primary | ICD-10-CM

## 2019-03-01 DIAGNOSIS — G47.34 NOCTURNAL OXYGEN DESATURATION: ICD-10-CM

## 2019-03-01 DIAGNOSIS — G25.81 RESTLESS LEGS SYNDROME (RLS): ICD-10-CM

## 2019-03-01 DIAGNOSIS — G47.61 PERIODIC LIMB MOVEMENT DISORDER (PLMD): ICD-10-CM

## 2019-03-01 DIAGNOSIS — G47.33 OBSTRUCTIVE SLEEP APNEA: ICD-10-CM

## 2019-03-01 DIAGNOSIS — G47.19 EXCESSIVE DAYTIME SLEEPINESS: ICD-10-CM

## 2019-03-01 PROCEDURE — 99214 OFFICE O/P EST MOD 30 MIN: CPT | Performed by: PSYCHIATRY & NEUROLOGY

## 2019-03-01 NOTE — PROGRESS NOTES
[facility] NEUROLOGY [place] CONSULTATION   History of Present Illness     Date: 3/2/2019    Patient Identification  Gonzalo Oates is a 63 y.o. male.    Patient information was obtained from patient and spouse/SO.  History/Exam limitations: none.    CONSULTATION requested by: ZAHIDA Adamson    Chief Complaint   Tremors (Pt in office today for follow up appt. Pt states he has had several falls since last appt ) and Sleep Apnea      History of Present Illness   Patient is a pleasant 63-year-old referred to [facility] neurology [place] for evaluation of tremor and restless leg syndrome.  Patient reported that his restless leg symptoms worsen after they discontinue his Neupro.  Patient reported that after restarted back on Neupro 2 mg at night.  His symptoms continue to persist patient would like to restart it back on 4 mg a day.    Patient is a pleasant 63-year-old referred to [facility] neurology [facility] for evaluation of obstructive sleep apnea.  patient has been using nasal BiPAP religiously after failing nasal CPAP.      PMH:   Past Medical History:   Diagnosis Date   • Ankle fracture     left 02/2015   • Anxiety    • Arthritis    • Asthma    • BPH (benign prostatic hyperplasia)    • Cataract    • Chronic back pain    • Chronic sinusitis    • Colon polyps    • Constipation     OPIOD INDUCED   • CTS (carpal tunnel syndrome)    • Deep vein thrombosis (DVT) of iliac vein (CMS/[facility])    • Depression    • Diabetes mellitus (CMS/HCC)     PRE DIABETES NO MEDS   • Difficulty walking    • Erectile dysfunction    • GERD (gastroesophageal reflux disease)    • Headache, tension-type    • Herniated disc, cervical    • HL (hearing loss)    • Hyperlipidemia    • Hypertension    • IBS (irritable bowel syndrome)    • Left knee pain    • Lumbar herniated disc    • Memory loss    • Migraine headache    • Mitral valve prolapse    • Movement disorder    • Narcolepsy    • Obesity    • KO (obstructive sleep apnea)      non compliant with CPAP    • Peripheral vascular disease (CMS/HCC)    • Prediabetes    • Restless leg     supressed with narcotics    • Sleep apnea    • Snoring    • SOB (shortness of breath)    • Umbilical hernia    • UTI (urinary tract infection)    • Varicose veins    • Venous insufficiency    • Wears glasses        Past Surgical History:   Past Surgical History:   Procedure Laterality Date   • APPENDECTOMY     • BACK SURGERY     • BACK SURGERY     • CEREBRAL ANGIOGRAM     • FRACTURE SURGERY      LEFT ANKLE   • KNEE ARTHROSCOPY Left 6/27/2018    Procedure: diagnostic arthroscopy partial medial meniscectomy or other procedures as necessary, left knee;  Surgeon: Tj Hoffman MD;  Location: [facility];  Service: Orthopedics   • LAMINECTOMY     • SINUS SURGERY     • TONSILLECTOMY     • ULNAR NERVE TRANSPOSITION     • VASCULAR SURGERY     • VASECTOMY     • WISDOM TOOTH EXTRACTION         Family Hisotry:   Family History   Problem Relation Age of Onset   • Cancer Other    • Diabetes Other    • Heart disease Other    • Hypertension Other    • Endocrine tumor Mother    • Anxiety disorder Mother    • Arthritis Mother    • Hypertension Mother    • Seizures Mother         Due to brain surgery   • Heart attack Father    • Hypertension Father    • Glaucoma Father    • Cancer Maternal Grandmother    • Heart attack Paternal Grandfather    • Hypertension Brother    • Neuropathy Brother    • Hypertension Sister    • Migraines Sister        Social History:   Social History     Socioeconomic History   • Marital status:      Spouse name: Not on file   • Number of children: Not on file   • Years of education: Not on file   • Highest education level: Not on file   Social Needs   • Financial resource strain: Not on file   • Food insecurity - worry: Not on file   • Food insecurity - inability: Not on file   • Transportation needs - medical: Not on file   • Transportation needs - non-medical: Not on file   Occupational  History   • Not on file   Tobacco Use   • Smoking status: Never Smoker   • Smokeless tobacco: Never Used   • Tobacco comment: Never used.   Substance and Sexual Activity   • Alcohol use: Yes     Types: 3 Shots of liquor per week     Comment: per week   • Drug use: No   • Sexual activity: No   Other Topics Concern   • Not on file   Social History Narrative   • Not on file       Medications:   Current Outpatient Medications   Medication Sig Dispense Refill   • betamethasone dipropionate (DIPROLENE) 0.05 % ointment Apply 1 application topically 2 (Two) Times a Day.     • butalbital-acetaminophen-caffeine (FIORICET) -40 MG capsule capsule Take 1 capsule by mouth as needed.     • carboxymethylcellulose (REFRESH PLUS) 0.5 % solution 2 drops 3 (Three) Times a Day As Needed for Dry Eyes.     • chlorthalidone (HYGROTON) 25 MG tablet Take 25 mg by mouth.     • clonazePAM (KLONOPIN) 0.5 MG tablet Take 1 tablet by mouth 2 (Two) Times a Day As Needed for Anxiety. 60 tablet 0   • cyclobenzaprine (FLEXERIL) 10 MG tablet Take 1 tablet by mouth 3 (Three) Times a Day As Needed for Muscle Spasms. 90 tablet 3   • DULoxetine (CYMBALTA) 60 MG capsule Take 1 capsule by mouth Daily. 90 capsule 1   • gabapentin (NEURONTIN) 800 MG tablet TAKE 1 TABLET THREE TIMES DAILY (Patient taking differently: tid) 90 tablet 0   • glucose monitor monitoring kit As directed 1 each 0   • ketoconazole (NIZORAL) 2 % cream Apply 1 application topically Daily.     • lisinopril (PRINIVIL,ZESTRIL) 20 MG tablet Take 20 mg by mouth Daily.     • nystatin-triamcinolone (MYCOLOG II) cream Apply  topically 3 (three) times a day.     • omeprazole (priLOSEC) 40 MG capsule TAKE 1 CAPSULE EVERY DAY 90 capsule 0   • oxyCODONE-acetaminophen (PERCOCET)  MG per tablet Take 1 tablet by mouth Every 4 (Four) Hours As Needed.     • pravastatin (PRAVACHOL) 40 MG tablet Take 40 mg by mouth Daily.     • spironolactone (ALDACTONE) 25 MG tablet TAKE 1 TABLET EVERY DAY 90  tablet 1   • SUMAtriptan (IMITREX) 50 MG tablet Take 50 mg by mouth.     • tolnaftate (TINACTIN) 1 % external solution Apply 1 application topically Every Night.     • TRUE METRIX BLOOD GLUCOSE TEST test strip TEST EVERY  each 1   • TRUEPLUS LANCETS 28G misc TEST DAILY  100 each 1   • rotigotine (NEUPRO) 4 MG/24HR patch Place 1 patch on the skin as directed by provider Daily. 30 patch 12     No current facility-administered medications for this visit.        Allergy:   Allergies   Allergen Reactions   • Calcium Channel Blockers Swelling     Lower extremity edema       • Adhesive Tape Rash   • Chlorhexidine Rash   • Nickel Swelling   • Zinc Oxide Rash and Unknown (See Comments)     SENSITIVITY ,CAUSES RASH         Review of Systems:  Review of Systems   Constitutional: Positive for fatigue. Negative for chills and fever.   HENT: Negative for congestion, ear pain, hearing loss, rhinorrhea and sore throat.    Eyes: Negative for pain, discharge and redness.   Respiratory: Positive for apnea. Negative for cough, shortness of breath, wheezing and stridor.    Cardiovascular: Negative for chest pain, palpitations and leg swelling.   Gastrointestinal: Negative for abdominal pain, constipation, nausea and vomiting.   Endocrine: Negative for cold intolerance, heat intolerance and polyphagia.   Genitourinary: Negative for dysuria, flank pain, frequency and urgency.   Musculoskeletal: Negative for joint swelling, myalgias, neck pain and neck stiffness.   Skin: Negative for pallor, rash and wound.   Allergic/Immunologic: Negative for environmental allergies.   Neurological: Negative for dizziness, tremors, seizures, syncope, facial asymmetry, speech difficulty, weakness, light-headedness, numbness and headaches.   Hematological: Negative for adenopathy.   Psychiatric/Behavioral: Positive for sleep disturbance. Negative for confusion and hallucinations. The patient is not nervous/anxious.        Physical Exam     Vitals:     "03/01/19 1012   BP: 126/62   Pulse: 81   SpO2: 98%   Height: 185.4 cm (73\")     GENERAL: Patient is pleasant, cooperative, appears to be stated age.  Body habitus is endomorphic.  SKIN AND EXTREMITIES:  No skin rashes or lesions are noted.  No cyanosis, clubbing or edema of the extremities.    HEAD:  Head is normocephalic and atraumatic.    NECK: Nontender without thyromegaly or adenopathy.  Carotid upstrokes are 1+/4.  No cranial or cervical bruits.  The neck is supple with a full range of motion.   ENT: Palate elevates symmetrically.  No evidence of high arch palate.  Tongue midline.  No erythema in posterior pharynx.  Mallampati Classification Class III   CARDIOVASCULAR:  Regular rate and rhythm with normal S1 and S2 without rub or gallop.  RESPIRATORY:  Clear to auscultation without wheezes or crackle   ABDOMEN:  Soft and nontender, positive bowel sound without hepatosplenomegaly  BACK:  Back is straight without midline defect.    PSYCH:  Higher cortical function/mental status:  The patient is alert.  The patient is oriented x3 to time, place and person.  Recent and the remote memory appear normal.  The patient has a good fund of knowledge.  There is no visual or auditory hallucination or suicidal or homicidal ideation.  SPEECH:There is no gross evidence of aphasia, dysarthria or agnosia.      CRANIAL NERVES:  Pupils are 4mm, equal round reactive to light, reacting briskly to 2mm without afferent pupillary defect.  Visual fields are intact to confrontation testing.  Funduscopic examination reveals sharp disc margins with normal vasculature.  No papilledema, hemorrhages or exudates.  Extraocular movements are full and smooth with normal pursuits and saccades.  No nystagmus noted.  The face is symmetric. Palate elevates symmetrically, Tongue midline, positive gag reflex. The remainder of the cranial nerves are intact and symmetrical.    MOTOR: Strength is 5/5 throughout with normal tone and bulk with the following " exceptions, 4/5 intrinsic muscles of the hands and feet.  No involuntary movements noted.    Deep Tendon Reflexes: are 2/4 and symmetrical in the upper extremities, 2/4 and symmetrical at the knees and 1/4 and symmetrical at the Achilles tendon.  Plantar responses were down-going bilaterally.    SENSATION:  Intact to pinprick, light touch, vibration and proprioception.  Coordination:  The patient normally performs finger-nose-finger, heel-to-knee-to-shin and rapid alternating movements in symmetrical fashion.    COORDINATION AND GAIT:  The patient walks with a narrow-based gait.  Patient is able to heel-toe and tandem walk forward and backwards without difficulty.  Romberg and monopedal  Romberg are negative.    MUSCULOSKELETAL: Range of motion normal, no clubbing, cyanosis, or edema.  No joint swelling.            Records Reviewed: I have personally reviewed his previous medical record.    Gonzalo was seen today for tremors and sleep apnea.    Diagnoses and all orders for this visit:    Tremor    Obstructive sleep apnea    Excessive daytime sleepiness    Nocturnal oxygen desaturation    Restless legs syndrome (RLS)    Periodic limb movement disorder (PLMD)    Other orders  -     rotigotine (NEUPRO) 4 MG/24HR patch; Place 1 patch on the skin as directed by provider Daily.      Discussion:  In summary,  63-year-old referred to [facility] neurology [place] for evaluation of tremor and restless leg syndrome.  Patient reported that his restless leg symptoms worsen after they discontinue his Neupro.  Patient reported that after restarted back on Neupro 2 mg at night.  His symptoms continue to persist patient would like to restart it back on 4 mg a day.    Patient is a pleasant 63-year-old referred to [facility] neurology [facility] for evaluation of obstructive sleep apnea.  patient has been using nasal BiPAP religiously after failing nasal CPAP.      I have counseled patient extensively on the pathophysiology of  Restless Legs Syndrome(RLS).  I have explained to the patient how D3 receptor dysfunction can give rise to RLS.  We have also discussed how iron deficiency can contribute to RLS.   Nevertheless, the first line of defense against restless legs syndrome is to avoid substances or foods that may be causing or worsening the problem such as alcohol, caffeine , and nicotine. In addition, we have reviewed all medications could exacerbate the problem and checking Ferritin level today.      I have also reviewed with my patient any underlying medical conditions that could give rise to secondary RLS, such as anemia , diabetes , nutritional deficiencies, kidney disease, thyroid  disease, varicose veins, or Parkinson's disease.     We have discussed FDA approved pharmacologic intervention such as dopamine agonists.  These are most often the first line treatment used to treat RLS including Mirapex (pramipexole), Neupro patch (rotigotine), and Requip (ropinirole). I have also counseled patient on side effects of Dopamine agonists include daytime sleepiness, nausea, hypotension and lightheadedness.    We have also discussed the use of non-dopaminergic medication such as Horizant (gabapentin enacarbil), as an adjunct to relieve the symptoms of RLS especially painful RLS symptoms.  This Document is signed by Kenneth Chan MD, FAAN, FAASM March 2, 20196:22 PM

## 2019-03-07 ENCOUNTER — TELEPHONE (OUTPATIENT)
Dept: NEUROLOGY | Facility: CLINIC | Age: 64
End: 2019-03-07

## 2019-03-07 NOTE — TELEPHONE ENCOUNTER
Called patient and L/M for him to call our office.  The patient needs to schedule an initial appointment for new set up of machine..     Between the following dates 04/09/19-06/07/19

## 2019-03-08 ENCOUNTER — TELEPHONE (OUTPATIENT)
Dept: INTERNAL MEDICINE | Facility: CLINIC | Age: 64
End: 2019-03-08

## 2019-03-26 ENCOUNTER — OFFICE VISIT (OUTPATIENT)
Dept: INTERNAL MEDICINE | Facility: CLINIC | Age: 64
End: 2019-03-26

## 2019-03-26 VITALS
TEMPERATURE: 97.7 F | DIASTOLIC BLOOD PRESSURE: 53 MMHG | OXYGEN SATURATION: 99 % | BODY MASS INDEX: 36.33 KG/M2 | HEART RATE: 65 BPM | WEIGHT: 259.5 LBS | SYSTOLIC BLOOD PRESSURE: 103 MMHG | HEIGHT: 71 IN

## 2019-03-26 DIAGNOSIS — L28.2 PRURITIC RASH: Primary | ICD-10-CM

## 2019-03-26 DIAGNOSIS — R42 DIZZINESS: ICD-10-CM

## 2019-03-26 LAB
ALBUMIN SERPL-MCNC: 4 G/DL (ref 3.5–5)
ALBUMIN/GLOB SERPL: 1.6 G/DL (ref 1–2)
ALP SERPL-CCNC: 98 U/L (ref 38–126)
ALT SERPL-CCNC: 42 U/L (ref 13–69)
AST SERPL-CCNC: 36 U/L (ref 15–46)
BILIRUB SERPL-MCNC: 0.3 MG/DL (ref 0.2–1.3)
BUN SERPL-MCNC: 27 MG/DL (ref 7–20)
BUN/CREAT SERPL: 27 (ref 6.3–21.9)
CALCIUM SERPL-MCNC: 10.1 MG/DL (ref 8.4–10.2)
CHLORIDE SERPL-SCNC: 103 MMOL/L (ref 98–107)
CO2 SERPL-SCNC: 30 MMOL/L (ref 26–30)
CREAT SERPL-MCNC: 1 MG/DL (ref 0.6–1.3)
ERYTHROCYTE [DISTWIDTH] IN BLOOD BY AUTOMATED COUNT: 18.8 % (ref 12.3–15.4)
GLOBULIN SER CALC-MCNC: 2.5 GM/DL
GLUCOSE SERPL-MCNC: 94 MG/DL (ref 74–98)
HCT VFR BLD AUTO: 31.4 % (ref 37.5–51)
HGB BLD-MCNC: 9.2 G/DL (ref 13–17.7)
MCH RBC QN AUTO: 21.2 PG (ref 26.6–33)
MCHC RBC AUTO-ENTMCNC: 29.3 G/DL (ref 31.5–35.7)
MCV RBC AUTO: 72.5 FL (ref 79–97)
PLATELET # BLD AUTO: 238 10*3/MM3 (ref 140–450)
POTASSIUM SERPL-SCNC: 4.2 MMOL/L (ref 3.5–5.1)
PROT SERPL-MCNC: 6.5 G/DL (ref 6.3–8.2)
RBC # BLD AUTO: 4.33 10*6/MM3 (ref 4.14–5.8)
SODIUM SERPL-SCNC: 139 MMOL/L (ref 137–145)
WBC # BLD AUTO: 4.06 10*3/MM3 (ref 3.4–10.8)

## 2019-03-26 PROCEDURE — 99213 OFFICE O/P EST LOW 20 MIN: CPT | Performed by: PHYSICIAN ASSISTANT

## 2019-03-26 RX ORDER — TIZANIDINE 2 MG/1
2 TABLET ORAL NIGHTLY PRN
COMMUNITY
End: 2019-08-27

## 2019-03-26 NOTE — PROGRESS NOTES
Chief Complaint   Patient presents with   • Rash     pt states he has a rash on his arms and legs/ pt c/o the rash itching/ pt also states that he used Betamethasone Dipropionate cream on the rashes that he got from his dermatologist    • Dizziness     pt is c/o of dizziness        Subjective   Gonzalo Oates is a 64 y.o. male    History of Present Illness     Rash: Patient reports that the rash began 1 week ago.  He has been using betamethasone cream to the rash.  Feels that the betamethasone is improving the rash.  It has resolved his pruritus.  Patient reports that he has been working outside over the course of the week.  He also reports switching to tide about 1 week ago but is not able to remember if the rash started before the detergent switch.  He denies any additional changes to any other body care products.  He denies any fever, body aches, or chills.  He has had some dizziness this morning.  He reports that this is a chronic intermittent problem for him.  He denies any chest pain, acute shortness of breath, weakness, or near syncope.      Past Medical History:   Diagnosis Date   • Ankle fracture     left 02/2015   • Anxiety    • Arthritis    • Asthma    • BPH (benign prostatic hyperplasia)    • Cataract    • Chronic back pain    • Chronic sinusitis    • Colon polyps    • Constipation     OPIOD INDUCED   • CTS (carpal tunnel syndrome)    • Deep vein thrombosis (DVT) of iliac vein (CMS/HCC)    • Depression    • Diabetes mellitus (CMS/HCC)     PRE DIABETES NO MEDS   • Difficulty walking    • Erectile dysfunction    • GERD (gastroesophageal reflux disease)    • Headache, tension-type    • Herniated disc, cervical    • HL (hearing loss)    • Hyperlipidemia    • Hypertension    • IBS (irritable bowel syndrome)    • Left knee pain    • Lumbar herniated disc    • Memory loss    • Migraine headache    • Mitral valve prolapse    • Movement disorder    • Narcolepsy    • Obesity    • KO (obstructive sleep apnea)      non compliant with CPAP    • Peripheral vascular disease (CMS/HCC)    • Prediabetes    • Restless leg     supressed with narcotics    • Sleep apnea    • Snoring    • SOB (shortness of breath)    • Umbilical hernia    • UTI (urinary tract infection)    • Varicose veins    • Venous insufficiency    • Wears glasses      Past Surgical History:   Procedure Laterality Date   • APPENDECTOMY     • BACK SURGERY     • BACK SURGERY     • CEREBRAL ANGIOGRAM     • FRACTURE SURGERY      LEFT ANKLE   • KNEE ARTHROSCOPY Left 6/27/2018    Procedure: diagnostic arthroscopy partial medial meniscectomy or other procedures as necessary, left knee;  Surgeon: Tj Hoffman MD;  Location: [facility];  Service: Orthopedics   • LAMINECTOMY     • SINUS SURGERY     • TONSILLECTOMY     • ULNAR NERVE TRANSPOSITION     • VASCULAR SURGERY     • VASECTOMY     • WISDOM TOOTH EXTRACTION       Family History   Problem Relation Age of Onset   • Cancer Other    • Diabetes Other    • Heart disease Other    • Hypertension Other    • Endocrine tumor Mother    • Anxiety disorder Mother    • Arthritis Mother    • Hypertension Mother    • Seizures Mother         Due to brain surgery   • Heart attack Father    • Hypertension Father    • Glaucoma Father    • Cancer Maternal Grandmother    • Heart attack Paternal Grandfather    • Hypertension Brother    • Neuropathy Brother    • Hypertension Sister    • Migraines Sister      Social History     Socioeconomic History   • Marital status:      Spouse name: Not on file   • Number of children: Not on file   • Years of education: Not on file   • Highest education level: Not on file   Tobacco Use   • Smoking status: Never Smoker   • Smokeless tobacco: Never Used   • Tobacco comment: Never used.   Substance and Sexual Activity   • Alcohol use: Yes     Types: 3 Shots of liquor per week     Comment: per week   • Drug use: No   • Sexual activity: No     Allergies   Allergen Reactions   • Calcium Channel Blockers  Swelling     Lower extremity edema       • Adhesive Tape Rash   • Chlorhexidine Rash   • Nickel Swelling   • Zinc Oxide Rash and Unknown (See Comments)     SENSITIVITY ,CAUSES RASH       Review of Systems   Constitutional: Negative for activity change, appetite change, chills, diaphoresis, fatigue and fever.   Respiratory: Positive for shortness of breath (chronic). Negative for cough, chest tightness and wheezing.    Cardiovascular: Negative for chest pain and leg swelling.   Musculoskeletal: Positive for back pain (Chronic). Negative for arthralgias and myalgias.   Skin: Positive for rash.   Neurological: Positive for dizziness. Negative for syncope, weakness, light-headedness and headache.     Objective     Vitals:    03/26/19 1054   BP: 103/53   Pulse: 65   Temp: 97.7 °F (36.5 °C)   SpO2: 99%     Orthostatic Vitals:   Lying 117/64 heart rate 63  Sitting 108/65 heart rate 72  Standing 106/61 heart rate 85    Physical Exam   Constitutional: He is oriented to person, place, and time. He appears well-developed and well-nourished. No distress.   HENT:   Head: Normocephalic and atraumatic.   Right Ear: Tympanic membrane, external ear and ear canal normal.   Left Ear: Tympanic membrane, external ear and ear canal normal.   Eyes: Conjunctivae and EOM are normal. Pupils are equal, round, and reactive to light.   Neck: Normal range of motion. Neck supple.   Cardiovascular: Normal rate, regular rhythm and normal heart sounds. Exam reveals no gallop and no friction rub.   No murmur heard.  Pulmonary/Chest: Effort normal and breath sounds normal. No respiratory distress. He has no wheezes. He has no rales.   Neurological: He is alert and oriented to person, place, and time.   Skin: Skin is warm and dry. Capillary refill takes less than 2 seconds. He is not diaphoretic.   Rash to the forearms, ankles, and thighs.  Appears to be healing.  There is some mild scabbing, no exudate, edema, or erythema.   Psychiatric: He has a  normal mood and affect. His behavior is normal.   Nursing note and vitals reviewed.    Assessment/Plan     Gonzalo was seen today for rash and dizziness.    Diagnoses and all orders for this visit:    Pruritic rash        -     Continue topical betamethasone cream.  Rash appears to be healing well.  Have advised patient to discontinue Tide detergent as this could be contributing to his reaction.  Return to clinic if rash does not resolve or if it worsens.    Dizziness  -     We will obtain CBC and CMP.  Orthostatics obtained in office which indicates some dehydration.  Recommend patient increase water intake.  Monitor blood pressure at home.  The statics obtained in officehave advised patient he needs to return to clinic in 2 weeks for annual physical exam.  Return to clinic sooner if dizziness does not improve.  Considered using meclizine for symptom control.  Patient has history of restless leg syndrome and reports medications such as these have increased his RLS symptoms in the past.  -     Comprehensive Metabolic Panel  -     CBC No Differential        Return in about 2 weeks (around 4/9/2019) for Annual.    Angela Combs PA-C

## 2019-04-08 ENCOUNTER — OFFICE VISIT (OUTPATIENT)
Dept: ORTHOPEDIC SURGERY | Facility: CLINIC | Age: 64
End: 2019-04-08

## 2019-04-08 VITALS — RESPIRATION RATE: 18 BRPM | HEIGHT: 71 IN | WEIGHT: 257 LBS | BODY MASS INDEX: 35.98 KG/M2

## 2019-04-08 DIAGNOSIS — M17.10 ARTHRITIS OF KNEE: ICD-10-CM

## 2019-04-08 DIAGNOSIS — M25.562 LEFT KNEE PAIN, UNSPECIFIED CHRONICITY: Primary | ICD-10-CM

## 2019-04-08 PROCEDURE — 99214 OFFICE O/P EST MOD 30 MIN: CPT | Performed by: ORTHOPAEDIC SURGERY

## 2019-04-08 RX ORDER — ESCITALOPRAM OXALATE 5 MG/1
10 TABLET ORAL
COMMUNITY
Start: 2019-04-01 | End: 2019-08-27

## 2019-04-08 NOTE — PROGRESS NOTES
Subjective   Patient ID: Gonzalo Oates is a 64 y.o. male  Follow-up and Pain of the Left Knee (Patient is here today for a follow up on his knee, he states about 2-3 weeks ago he was trying to move a floor phu and twisted his knee and now the pain is painful and hurts to bend. 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  Some swelling initially has gotten better since, minimal pain with weightbearing, it hurts to bend back beyond 90 degrees along the patellofemoral area and anteromedial joint line.  No giving way or locking or palpable snapping that he can feel.    Review of Systems   Constitutional: Negative for fever.   HENT: Negative for voice change.    Eyes: Negative for visual disturbance.   Respiratory: Negative for shortness of breath.    Cardiovascular: Negative for chest pain.   Gastrointestinal: Negative for abdominal distention and abdominal pain.   Genitourinary: Negative for dysuria.   Musculoskeletal: Positive for arthralgias. Negative for gait problem and joint swelling.   Skin: Negative for rash.   Neurological: Negative for speech difficulty.   Hematological: Does not bruise/bleed easily.   Psychiatric/Behavioral: Negative for confusion.       Past Medical History:   Diagnosis Date   • Ankle fracture     left 02/2015   • Anxiety    • Arthritis    • Asthma    • BPH (benign prostatic hyperplasia)    • Cataract    • Chronic back pain    • Chronic sinusitis    • Colon polyps    • Constipation     OPIOD INDUCED   • CTS (carpal tunnel syndrome)    • Deep vein thrombosis (DVT) of iliac vein (CMS/HCC)    • Depression    • Diabetes mellitus  (CMS/HCC)     PRE DIABETES NO MEDS   • Difficulty walking    • Erectile dysfunction    • GERD (gastroesophageal reflux disease)    • Headache, tension-type    • Herniated disc, cervical    • HL (hearing loss)    • Hyperlipidemia    • Hypertension    • IBS (irritable bowel syndrome)    • Left knee pain    • Lumbar herniated disc    • Memory loss    • Migraine headache    • Mitral valve prolapse    • Movement disorder    • Narcolepsy    • Obesity    • KO (obstructive sleep apnea)     non compliant with CPAP    • Peripheral vascular disease (CMS/[facility])    • Prediabetes    • Restless leg     supressed with narcotics    • Sleep apnea    • Snoring    • SOB (shortness of breath)    • Umbilical hernia    • UTI (urinary tract infection)    • Varicose veins    • Venous insufficiency    • Wears glasses         Past Surgical History:   Procedure Laterality Date   • APPENDECTOMY     • BACK SURGERY     • BACK SURGERY     • CEREBRAL ANGIOGRAM     • FRACTURE SURGERY      LEFT ANKLE   • KNEE ARTHROSCOPY Left 6/27/2018    Procedure: diagnostic arthroscopy partial medial meniscectomy or other procedures as necessary, left knee;  Surgeon: Tj Hoffman MD;  Location: [facility];  Service: Orthopedics   • LAMINECTOMY     • SINUS SURGERY     • TONSILLECTOMY     • ULNAR NERVE TRANSPOSITION     • VASCULAR SURGERY     • VASECTOMY     • WISDOM TOOTH EXTRACTION         Family History   Problem Relation Age of Onset   • Cancer Other    • Diabetes Other    • Heart disease Other    • Hypertension Other    • Endocrine tumor Mother    • Anxiety disorder Mother    • Arthritis Mother    • Hypertension Mother    • Seizures Mother         Due to brain surgery   • Heart attack Father    • Hypertension Father    • Glaucoma Father    • Cancer Maternal Grandmother    • Heart attack Paternal Grandfather    • Hypertension Brother    • Neuropathy Brother    • Hypertension Sister    • Migraines Sister        Social History     Socioeconomic History   •  Marital status:      Spouse name: Not on file   • Number of children: Not on file   • Years of education: Not on file   • Highest education level: Not on file   Tobacco Use   • Smoking status: Never Smoker   • Smokeless tobacco: Never Used   • Tobacco comment: Never used.   Substance and Sexual Activity   • Alcohol use: Yes     Types: 3 Shots of liquor per week     Comment: per week   • Drug use: No   • Sexual activity: No       I have reviewed all of the above social hx, family hx, surgical hx, medications, allergies & ROS and confirm that it is accurate.    Allergies   Allergen Reactions   • Calcium Channel Blockers Swelling     Lower extremity edema       • Adhesive Tape Rash   • Chlorhexidine Rash   • Nickel Swelling   • Zinc Oxide Rash and Unknown (See Comments)     SENSITIVITY ,CAUSES RASH           Current Outpatient Medications:   •  betamethasone dipropionate (DIPROLENE) 0.05 % ointment, Apply 1 application topically 2 (Two) Times a Day., Disp: , Rfl:   •  butalbital-acetaminophen-caffeine (FIORICET) -40 MG capsule capsule, Take 1 capsule by mouth as needed., Disp: , Rfl:   •  chlorthalidone (HYGROTON) 25 MG tablet, Take 25 mg by mouth., Disp: , Rfl:   •  clonazePAM (KLONOPIN) 0.5 MG tablet, Take 1 tablet by mouth 2 (Two) Times a Day As Needed for Anxiety., Disp: 60 tablet, Rfl: 0  •  cyclobenzaprine (FLEXERIL) 10 MG tablet, Take 1 tablet by mouth 3 (Three) Times a Day As Needed for Muscle Spasms., Disp: 90 tablet, Rfl: 3  •  DULoxetine (CYMBALTA) 60 MG capsule, Take 1 capsule by mouth Daily., Disp: 90 capsule, Rfl: 1  •  escitalopram (LEXAPRO) 5 MG tablet, , Disp: , Rfl:   •  gabapentin (NEURONTIN) 800 MG tablet, TAKE 1 TABLET THREE TIMES DAILY (Patient taking differently: tid), Disp: 90 tablet, Rfl: 0  •  glucose monitor monitoring kit, As directed, Disp: 1 each, Rfl: 0  •  ketoconazole (NIZORAL) 2 % cream, Apply 1 application topically Daily., Disp: , Rfl:   •  lisinopril (PRINIVIL,ZESTRIL)  "20 MG tablet, Take 20 mg by mouth Daily., Disp: , Rfl:   •  nystatin-triamcinolone (MYCOLOG II) cream, Apply  topically 3 (three) times a day., Disp: , Rfl:   •  omeprazole (priLOSEC) 40 MG capsule, TAKE 1 CAPSULE EVERY DAY, Disp: 90 capsule, Rfl: 0  •  pravastatin (PRAVACHOL) 40 MG tablet, Take 40 mg by mouth Daily., Disp: , Rfl:   •  rotigotine (NEUPRO) 4 MG/24HR patch, Place 1 patch on the skin as directed by provider Daily., Disp: 30 patch, Rfl: 12  •  spironolactone (ALDACTONE) 25 MG tablet, TAKE 1 TABLET EVERY DAY, Disp: 90 tablet, Rfl: 1  •  SUMAtriptan (IMITREX) 50 MG tablet, Take 50 mg by mouth., Disp: , Rfl:   •  tiZANidine (ZANAFLEX) 2 MG tablet, Take 2 mg by mouth At Night As Needed for Muscle Spasms., Disp: , Rfl:   •  tolnaftate (TINACTIN) 1 % external solution, Apply 1 application topically Every Night., Disp: , Rfl:   •  TRUE METRIX BLOOD GLUCOSE TEST test strip, TEST EVERY DAY, Disp: 100 each, Rfl: 1  •  TRUEPLUS LANCETS 28G misc, TEST DAILY , Disp: 100 each, Rfl: 1    Objective   Resp 18   Ht 180.3 cm (71\")   Wt 117 kg (257 lb)   BMI 35.84 kg/m²    Physical Exam  Constitutional: Patient is oriented to person, place, and time. Patient appears well-developed and well-nourished.   HENT:Head: Normocephalic and atraumatic.   Eyes: EOM are normal. Pupils are equal, round, and reactive to light.   Neck: Normal range of motion. Neck supple.   Cardiovascular: Normal rate.    Pulmonary/Chest: Effort normal and breath sounds normal.   Abdominal: Soft.   Neurological: Patient is alert and oriented to person, place, and time.   Skin: Skin is warm and dry.   Psychiatric: Patient has a normal mood and affect.   Nursing note and vitals reviewed.       [unfilled]   Left knee: No effusion slight tenderness over the medial patellofemoral area Gale sign minimally positive for anteromedial joint line pain, extension -10 flexion 120 no significant effusion no lateral joint line pain negative Lockman sign no pain " with or instability with varus valgus stress.    Assessment/Plan   Review of Radiographic Studies:    Radiographic images today of affected area I personally viewed and showed no sign of acute fracture or dislocation.      Procedures     Gonzalo was seen today for follow-up and pain.    Diagnoses and all orders for this visit:    Left knee pain, unspecified chronicity  -     XR Knee 1 or 2 View Left    Arthritis of knee       Orthopedic activities reviewed and patient expressed appreciation      Recommendations/Plan:   Work/Activity Status: May perform usual activities as tolerated    Patient agreeable to call or return sooner for any concerns.             Impression:  History of left knee partial medial meniscectomy with recurrent patellofemoral pain anteromedial joint line pain probable strain possible recurrent meniscal tear medial  Plan:  Observation icing recheck 1 month if not improving order MR left knee at that time to diagnose recurrent medial meniscal tear

## 2019-04-16 ENCOUNTER — OFFICE VISIT (OUTPATIENT)
Dept: NEUROLOGY | Facility: CLINIC | Age: 64
End: 2019-04-16

## 2019-04-16 VITALS
BODY MASS INDEX: 35.84 KG/M2 | HEIGHT: 71 IN | HEART RATE: 49 BPM | OXYGEN SATURATION: 99 % | DIASTOLIC BLOOD PRESSURE: 58 MMHG | SYSTOLIC BLOOD PRESSURE: 120 MMHG

## 2019-04-16 DIAGNOSIS — G47.34 NOCTURNAL OXYGEN DESATURATION: ICD-10-CM

## 2019-04-16 DIAGNOSIS — G47.19 EXCESSIVE DAYTIME SLEEPINESS: ICD-10-CM

## 2019-04-16 DIAGNOSIS — G47.33 OBSTRUCTIVE SLEEP APNEA: Primary | ICD-10-CM

## 2019-04-16 PROCEDURE — 99214 OFFICE O/P EST MOD 30 MIN: CPT | Performed by: PSYCHIATRY & NEUROLOGY

## 2019-04-16 NOTE — PROGRESS NOTES
[facility] NEUROLOGY [place] CONSULTATION   History of Present Illness     Date: 4/16/2019    Patient Identification  Gonzalo Oates is a 64 y.o. male.    Patient information was obtained from patient.  History/Exam limitations: none.    CONSULTATION requested by: ZAHIDA Coronado    Chief Complaint   Sleep Apnea (Pt in office today for follow up, CPAP )      History of Present Illness   Patient is a pleasant 64-year-old referred to [facility] neurology [facility] for evaluation of obstructive sleep apnea .patient reports that he is having difficulty tolerating his nasal BiPAP .his current setting is at 10/4 cm water pressure   .patient reports to having shortness of breath and snoring .his compliance meter showing   he is only able to use 3% of the time greater than 4 hours over the last 15-day and his apnea hypotony index was 6.2 events per hour .  I have discussed extensively the pathophysiology obstructive apnea .  i we have also discussed the cardiovascular complication of obstructive sleep apnea left untreated.  We have also counseled patient extensively on good sleep hygiene and regular sleep wake schedule and avoid sleep deprivation.  Would like to ncrease BIPAP to 12/6 cm H2O pressure the meantime to improve compliance with his nasal BiPAP.    PMH:   Past Medical History:   Diagnosis Date   • Ankle fracture     left 02/2015   • Anxiety    • Arthritis    • Asthma    • BPH (benign prostatic hyperplasia)    • Cataract    • Chronic back pain    • Chronic sinusitis    • Colon polyps    • Constipation     OPIOD INDUCED   • CTS (carpal tunnel syndrome)    • Deep vein thrombosis (DVT) of iliac vein (CMS/HCC)    • Depression    • Diabetes mellitus (CMS/HCC)     PRE DIABETES NO MEDS   • Difficulty walking    • Erectile dysfunction    • GERD (gastroesophageal reflux disease)    • Headache, tension-type    • Herniated disc, cervical    • HL (hearing loss)    • Hyperlipidemia    • Hypertension    • IBS  (irritable bowel syndrome)    • Left knee pain    • Lumbar herniated disc    • Memory loss    • Migraine headache    • Mitral valve prolapse    • Movement disorder    • Narcolepsy    • Obesity    • KO (obstructive sleep apnea)     non compliant with CPAP    • Peripheral vascular disease (CMS/HCC)    • Prediabetes    • Restless leg     supressed with narcotics    • Sleep apnea    • Snoring    • SOB (shortness of breath)    • Umbilical hernia    • UTI (urinary tract infection)    • Varicose veins    • Venous insufficiency    • Wears glasses        Past Surgical History:   Past Surgical History:   Procedure Laterality Date   • APPENDECTOMY     • BACK SURGERY     • BACK SURGERY     • CEREBRAL ANGIOGRAM     • FRACTURE SURGERY      LEFT ANKLE   • KNEE ARTHROSCOPY Left 6/27/2018    Procedure: diagnostic arthroscopy partial medial meniscectomy or other procedures as necessary, left knee;  Surgeon: Tj Hoffman MD;  Location: [facility];  Service: Orthopedics   • LAMINECTOMY     • SINUS SURGERY     • TONSILLECTOMY     • ULNAR NERVE TRANSPOSITION     • VASCULAR SURGERY     • VASECTOMY     • WISDOM TOOTH EXTRACTION         Family Hisotry:   Family History   Problem Relation Age of Onset   • Cancer Other    • Diabetes Other    • Heart disease Other    • Hypertension Other    • Endocrine tumor Mother    • Anxiety disorder Mother    • Arthritis Mother    • Hypertension Mother    • Seizures Mother         Due to brain surgery   • Heart attack Father    • Hypertension Father    • Glaucoma Father    • Cancer Maternal Grandmother    • Heart attack Paternal Grandfather    • Hypertension Brother    • Neuropathy Brother    • Hypertension Sister    • Migraines Sister        Social History:   Social History     Socioeconomic History   • Marital status:      Spouse name: Not on file   • Number of children: Not on file   • Years of education: Not on file   • Highest education level: Not on file   Tobacco Use   • Smoking status:  Never Smoker   • Smokeless tobacco: Never Used   • Tobacco comment: Never used.   Substance and Sexual Activity   • Alcohol use: Yes     Types: 2 Shots of liquor per week     Comment: per week   • Drug use: No   • Sexual activity: No       Medications:   Current Outpatient Medications   Medication Sig Dispense Refill   • betamethasone dipropionate (DIPROLENE) 0.05 % ointment Apply 1 application topically 2 (Two) Times a Day.     • butalbital-acetaminophen-caffeine (FIORICET) -40 MG capsule capsule Take 1 capsule by mouth as needed.     • chlorthalidone (HYGROTON) 25 MG tablet Take 25 mg by mouth.     • clonazePAM (KLONOPIN) 0.5 MG tablet Take 1 tablet by mouth 2 (Two) Times a Day As Needed for Anxiety. 60 tablet 0   • cyclobenzaprine (FLEXERIL) 10 MG tablet Take 1 tablet by mouth 3 (Three) Times a Day As Needed for Muscle Spasms. 90 tablet 3   • DULoxetine (CYMBALTA) 60 MG capsule Take 1 capsule by mouth Daily. 90 capsule 1   • escitalopram (LEXAPRO) 5 MG tablet      • gabapentin (NEURONTIN) 800 MG tablet TAKE 1 TABLET THREE TIMES DAILY (Patient taking differently: tid) 90 tablet 0   • glucose monitor monitoring kit As directed 1 each 0   • ketoconazole (NIZORAL) 2 % cream Apply 1 application topically Daily.     • lisinopril (PRINIVIL,ZESTRIL) 20 MG tablet Take 20 mg by mouth Daily.     • nystatin-triamcinolone (MYCOLOG II) cream Apply  topically 3 (three) times a day.     • omeprazole (priLOSEC) 40 MG capsule TAKE 1 CAPSULE EVERY DAY 90 capsule 0   • pravastatin (PRAVACHOL) 40 MG tablet Take 40 mg by mouth Daily.     • rotigotine (NEUPRO) 4 MG/24HR patch Place 1 patch on the skin as directed by provider Daily. 30 patch 12   • spironolactone (ALDACTONE) 25 MG tablet TAKE 1 TABLET EVERY DAY 90 tablet 1   • SUMAtriptan (IMITREX) 50 MG tablet Take 50 mg by mouth.     • tiZANidine (ZANAFLEX) 2 MG tablet Take 2 mg by mouth At Night As Needed for Muscle Spasms.     • tolnaftate (TINACTIN) 1 % external solution  "Apply 1 application topically Every Night.     • TRUE METRIX BLOOD GLUCOSE TEST test strip TEST EVERY  each 1   • TRUEPLUS LANCETS 28G misc TEST DAILY  100 each 1     No current facility-administered medications for this visit.        Allergy:   Allergies   Allergen Reactions   • Calcium Channel Blockers Swelling     Lower extremity edema       • Adhesive Tape Rash   • Chlorhexidine Rash   • Nickel Swelling   • Zinc Oxide Rash and Unknown (See Comments)     SENSITIVITY ,CAUSES RASH         Review of Systems:  Review of Systems   Constitutional: Positive for fatigue. Negative for chills and fever.   HENT: Negative for congestion, ear pain, hearing loss, rhinorrhea and sore throat.    Eyes: Negative for pain, discharge and redness.   Respiratory: Positive for apnea. Negative for cough, shortness of breath, wheezing and stridor.    Cardiovascular: Negative for chest pain, palpitations and leg swelling.   Gastrointestinal: Negative for abdominal pain, constipation, nausea and vomiting.   Endocrine: Negative for cold intolerance, heat intolerance and polyphagia.   Genitourinary: Negative for dysuria, flank pain, frequency and urgency.   Musculoskeletal: Negative for joint swelling, myalgias, neck pain and neck stiffness.   Skin: Negative for pallor, rash and wound.   Allergic/Immunologic: Negative for environmental allergies.   Neurological: Negative for dizziness, tremors, seizures, syncope, facial asymmetry, speech difficulty, weakness, light-headedness, numbness and headaches.   Hematological: Negative for adenopathy.   Psychiatric/Behavioral: Positive for sleep disturbance. Negative for confusion and hallucinations. The patient is not nervous/anxious.        Physical Exam     Vitals:    04/16/19 1423   BP: 120/58   Pulse: (!) 49   SpO2: 99%   Height: 180.3 cm (71\")     GENERAL: Patient is pleasant, cooperative, appears to be stated age.  Body habitus is endomorphic.  SKIN AND EXTREMITIES:  No skin rashes or " lesions are noted.  No cyanosis, clubbing or edema of the extremities.    HEAD:  Head is normocephalic and atraumatic.    NECK: Nontender without thyromegaly or adenopathy.  Carotid upstrokes are 1+/4.  No cranial or cervical bruits.  The neck is supple with a full range of motion.   ENT: Palate elevates symmetrically.  No evidence of high arch palate.  Tongue midline.  No erythema in posterior pharynx.  Mallampati Classification Class III   CARDIOVASCULAR:  Regular rate and rhythm with normal S1 and S2 without rub or gallop.  RESPIRATORY:  Clear to auscultation without wheezes or crackle   ABDOMEN:  Soft and nontender, positive bowel sound without hepatosplenomegaly  BACK:  Back is straight without midline defect.    PSYCH:  Higher cortical function/mental status:  The patient is alert.  The patient is oriented x3 to time, place and person.  Recent and the remote memory appear normal.  The patient has a good fund of knowledge.  There is no visual or auditory hallucination or suicidal or homicidal ideation.  SPEECH:There is no gross evidence of aphasia, dysarthria or agnosia.      CRANIAL NERVES:  Pupils are 4mm, equal round reactive to light, reacting briskly to 2mm without afferent pupillary defect.  Visual fields are intact to confrontation testing.  Funduscopic examination reveals sharp disc margins with normal vasculature.  No papilledema, hemorrhages or exudates.  Extraocular movements are full and smooth with normal pursuits and saccades.  No nystagmus noted.  The face is symmetric. Palate elevates symmetrically, Tongue midline, positive gag reflex. The remainder of the cranial nerves are intact and symmetrical.    MOTOR: Strength is 5/5 throughout with normal tone and bulk with the following exceptions, 4/5 intrinsic muscles of the hands and feet.  No involuntary movements noted.    Deep Tendon Reflexes: are 2/4 and symmetrical in the upper extremities, 2/4 and symmetrical at the knees and 1/4 and  symmetrical at the Achilles tendon.  Plantar responses were down-going bilaterally.    SENSATION:  Intact to pinprick, light touch, vibration and proprioception.  Coordination:  The patient normally performs finger-nose-finger, heel-to-knee-to-shin and rapid alternating movements in symmetrical fashion.    COORDINATION AND GAIT:  The patient walks with a narrow-based gait.  Patient is able to heel-toe and tandem walk forward and backwards without difficulty.  Romberg and monopedal  Romberg are negative.    MUSCULOSKELETAL: Range of motion normal, no clubbing, cyanosis, or edema.  No joint swelling.            Records Reviewed: I have personally reviewed his previous medical record.    Gonzalo was seen today for sleep apnea.    Diagnoses and all orders for this visit:    Obstructive sleep apnea    Excessive daytime sleepiness    Nocturnal oxygen desaturation      Discussion:  In summary,  64-year-old referred to [facility] neurology [facility] for evaluation of obstructive sleep apnea .patient reports that he is having difficulty tolerating his nasal BiPAP .his current setting is at 10/4 cm water pressure   .patient reports to having shortness of breath and snoring .his compliance meter showing   he is only able to use 3% of the time greater than 4 hours over the last 15-day and his apnea hypotony index was 6.2 events per hour .  I have discussed extensively the pathophysiology obstructive apnea .  i we have also discussed the cardiovascular complication of obstructive sleep apnea left untreated.  We have also counseled patient extensively on good sleep hygiene and regular sleep wake schedule and avoid sleep deprivation.  Would like to ncrease BIPAP to 12/6 cm H2O pressure the meantime to improve compliance with his nasal BiPAP.    I have counseled patient extensively on Sleep Hygiene including regular sleep wake schedule and stimulus control therapy.  I have also discussed the importance of weight reduction because  10% reduction in body weight can reduce sleep apnea by 50 %. We have also discussed abstaining from smoking and drinking.  I have explained to patient that obstructive apnea episode is defined as the absence of airflow for at least 10 seconds.  Sleep apnea is usually accompanied by snoring, disturbed sleep, and daytime sleepiness. Patients with micrognathia, retrognathia, enlarged tonsils, tongue enlargement, and acromegaly are especially predisposed to obstructive sleep apnea. Abnormalities or weakness in the muscles can also contribute to obstructive sleep apnea. Obesity can also contribute to sleep apnea.     Sleep apnea can lead to a number of complications, ranging from daytime sleepiness to possible increased risk of cardiovascular risks.   Daytime sleepiness is the most serious.  Daytime sleepiness can also increase the risk for accident-related injuries. Several studies have suggested that people with sleep apnea have two to three times as many car accidents, and five to seven times the risk for multiple accidents.   A number of cardiovascular diseases -- including high blood pressure, heart failure, stroke, and heart arrhythmias -- have an association with obstructive sleep apnea.   Up to a third of patients with heart failure also have sleep apnea. Both central and obstructive sleep apnea are linked with heart failure. Obstructive sleep apnea is also noted to be associated with type 2 diabetes according to Dr. Castro at [facility].  The best treatment for symptomatic obstructive sleep apnea is continuous positive airflow pressure (CPAP). Bilevel positive airway pressure (BPAP) systems may be particularly helpful for patients with coexisting lung disease and those with excessive levels of carbon dioxide.  Other treatment options including UPPP surgery, LAUP surgery, radiofrequency somnoplasty and dental appliances such Josiane or Clearway.  This Document is signed by Kenneth Chan MD, FAAN, FAASM April 16,  93083:51 PM

## 2019-04-22 ENCOUNTER — TELEPHONE (OUTPATIENT)
Dept: NEUROLOGY | Facility: CLINIC | Age: 64
End: 2019-04-22

## 2019-04-22 NOTE — TELEPHONE ENCOUNTER
PT  CALLED STATING HE WENT TO [facility] TO  SCRIPT OF HORIZANT , BUT WAS NOT THERE.  HE STATES THAT HE ONLY HAS ENOUGH GABAPENTIN FOR 1 DAY.     PLEASE ADVISE

## 2019-04-29 RX ORDER — PRAVASTATIN SODIUM 40 MG
TABLET ORAL
Qty: 90 TABLET | Refills: 0 | Status: SHIPPED | OUTPATIENT
Start: 2019-04-29

## 2019-05-02 RX ORDER — CLONAZEPAM 0.5 MG/1
TABLET ORAL
Qty: 60 TABLET | Refills: 0 | OUTPATIENT
Start: 2019-05-02

## 2019-05-09 ENCOUNTER — OFFICE VISIT (OUTPATIENT)
Dept: ORTHOPEDIC SURGERY | Facility: CLINIC | Age: 64
End: 2019-05-09

## 2019-05-09 VITALS — RESPIRATION RATE: 18 BRPM | WEIGHT: 249.1 LBS | HEIGHT: 71 IN | BODY MASS INDEX: 34.87 KG/M2

## 2019-05-09 DIAGNOSIS — S83.232D COMPLEX TEAR OF MEDIAL MENISCUS OF LEFT KNEE AS CURRENT INJURY, SUBSEQUENT ENCOUNTER: Primary | ICD-10-CM

## 2019-05-09 PROCEDURE — 99213 OFFICE O/P EST LOW 20 MIN: CPT | Performed by: ORTHOPAEDIC SURGERY

## 2019-05-09 NOTE — PROGRESS NOTES
Subjective   Patient ID: Gonzalo Oates is a 64 y.o. male  Follow-up and Pain of the Left Knee (Patient is here to day to follow up on his left knee pain. He states the pain has not gotten any better. It does not hurt all the time, only when he moves it a certain way.)             History of Present Illness  Continued left medial knee pain mechanical with snapping clicking catching especially standing pivoting and kicking things he feels pain that sharp in nature constant and burning.  It swells feels tight hard to bend and squat, does awaken him at night.  No improvement since last visit.    Review of Systems   Constitutional: Negative for diaphoresis, fever and unexpected weight change.   HENT: Negative for dental problem and sore throat.    Eyes: Negative for visual disturbance.   Respiratory: Negative for shortness of breath.    Cardiovascular: Negative for chest pain.   Gastrointestinal: Negative for abdominal pain, constipation, diarrhea, nausea and vomiting.   Genitourinary: Negative for difficulty urinating and frequency.   Musculoskeletal: Positive for arthralgias (left knee pain).   Neurological: Negative for headaches.   Hematological: Does not bruise/bleed easily.       Past Medical History:   Diagnosis Date   • Ankle fracture     left 02/2015   • Anxiety    • Arthritis    • Asthma    • BPH (benign prostatic hyperplasia)    • Cataract    • Chronic back pain    • Chronic sinusitis    • Colon polyps    • Constipation     OPIOD INDUCED   • CTS (carpal tunnel syndrome)    • Deep vein thrombosis (DVT) of iliac vein (CMS/HCC)    • Depression    • Diabetes mellitus (CMS/HCC)     PRE DIABETES NO MEDS   • Difficulty walking    • Erectile dysfunction    • GERD (gastroesophageal reflux disease)    • Headache, tension-type    • Herniated disc, cervical    • HL (hearing loss)    • Hyperlipidemia    • Hypertension    • IBS (irritable bowel syndrome)    • Left knee pain    • Lumbar herniated disc    • Memory loss     • Migraine headache    • Mitral valve prolapse    • Movement disorder    • Narcolepsy    • Obesity    • KO (obstructive sleep apnea)     non compliant with CPAP    • Peripheral vascular disease (CMS/HCC)    • Prediabetes    • Restless leg     supressed with narcotics    • Sleep apnea    • Snoring    • SOB (shortness of breath)    • Umbilical hernia    • UTI (urinary tract infection)    • Varicose veins    • Venous insufficiency    • Wears glasses         Past Surgical History:   Procedure Laterality Date   • APPENDECTOMY     • BACK SURGERY     • BACK SURGERY     • CEREBRAL ANGIOGRAM     • FRACTURE SURGERY      LEFT ANKLE   • KNEE ARTHROSCOPY Left 6/27/2018    Procedure: diagnostic arthroscopy partial medial meniscectomy or other procedures as necessary, left knee;  Surgeon: Tj Hoffman MD;  Location: [facility];  Service: Orthopedics   • LAMINECTOMY     • SINUS SURGERY     • TONSILLECTOMY     • ULNAR NERVE TRANSPOSITION     • VASCULAR SURGERY     • VASECTOMY     • WISDOM TOOTH EXTRACTION         Family History   Problem Relation Age of Onset   • Cancer Other    • Diabetes Other    • Heart disease Other    • Hypertension Other    • Endocrine tumor Mother    • Anxiety disorder Mother    • Arthritis Mother    • Hypertension Mother    • Seizures Mother         Due to brain surgery   • Heart attack Father    • Hypertension Father    • Glaucoma Father    • Cancer Maternal Grandmother    • Heart attack Paternal Grandfather    • Hypertension Brother    • Neuropathy Brother    • Hypertension Sister    • Migraines Sister        Social History     Socioeconomic History   • Marital status:      Spouse name: Not on file   • Number of children: Not on file   • Years of education: Not on file   • Highest education level: Not on file   Tobacco Use   • Smoking status: Never Smoker   • Smokeless tobacco: Never Used   • Tobacco comment: Never used.   Substance and Sexual Activity   • Alcohol use: Yes     Types: 2 Shots of  liquor per week     Comment: per week   • Drug use: No   • Sexual activity: No       I have reviewed all of the above social hx, family hx, surgical hx, medications, allergies & ROS and confirm that it is accurate.    Allergies   Allergen Reactions   • Calcium Channel Blockers Swelling     Lower extremity edema       • Adhesive Tape Rash   • Chlorhexidine Rash   • Nickel Swelling   • Zinc Oxide Rash and Unknown (See Comments)     SENSITIVITY ,CAUSES RASH           Current Outpatient Medications:   •  betamethasone dipropionate (DIPROLENE) 0.05 % ointment, Apply 1 application topically 2 (Two) Times a Day., Disp: , Rfl:   •  butalbital-acetaminophen-caffeine (FIORICET) -40 MG capsule capsule, Take 1 capsule by mouth as needed., Disp: , Rfl:   •  chlorthalidone (HYGROTON) 25 MG tablet, Take 25 mg by mouth., Disp: , Rfl:   •  clonazePAM (KLONOPIN) 0.5 MG tablet, Take 1 tablet by mouth 2 (Two) Times a Day As Needed for Anxiety., Disp: 60 tablet, Rfl: 0  •  cyclobenzaprine (FLEXERIL) 10 MG tablet, Take 1 tablet by mouth 3 (Three) Times a Day As Needed for Muscle Spasms., Disp: 90 tablet, Rfl: 3  •  DULoxetine (CYMBALTA) 60 MG capsule, Take 1 capsule by mouth Daily., Disp: 90 capsule, Rfl: 1  •  escitalopram (LEXAPRO) 5 MG tablet, , Disp: , Rfl:   •  Gabapentin Enacarbil ER (HORIZANT) 600 MG tablet controlled-release, Take 600 mg by mouth Daily. One pill at 5 PM with food, Disp: 30 tablet, Rfl: 3  •  glucose monitor monitoring kit, As directed, Disp: 1 each, Rfl: 0  •  ketoconazole (NIZORAL) 2 % cream, Apply 1 application topically Daily., Disp: , Rfl:   •  lisinopril (PRINIVIL,ZESTRIL) 20 MG tablet, Take 20 mg by mouth Daily., Disp: , Rfl:   •  nystatin-triamcinolone (MYCOLOG II) cream, Apply  topically 3 (three) times a day., Disp: , Rfl:   •  omeprazole (priLOSEC) 40 MG capsule, TAKE 1 CAPSULE EVERY DAY, Disp: 90 capsule, Rfl: 0  •  pravastatin (PRAVACHOL) 40 MG tablet, TAKE 1 TABLET EVERY EVENING, Disp: 90  "tablet, Rfl: 0  •  rotigotine (NEUPRO) 4 MG/24HR patch, Place 1 patch on the skin as directed by provider Daily., Disp: 30 patch, Rfl: 12  •  spironolactone (ALDACTONE) 25 MG tablet, TAKE 1 TABLET EVERY DAY, Disp: 90 tablet, Rfl: 1  •  SUMAtriptan (IMITREX) 50 MG tablet, Take 50 mg by mouth., Disp: , Rfl:   •  tiZANidine (ZANAFLEX) 2 MG tablet, Take 2 mg by mouth At Night As Needed for Muscle Spasms., Disp: , Rfl:   •  tolnaftate (TINACTIN) 1 % external solution, Apply 1 application topically Every Night., Disp: , Rfl:   •  TRUE METRIX BLOOD GLUCOSE TEST test strip, TEST EVERY DAY, Disp: 100 each, Rfl: 1  •  TRUEPLUS LANCETS 28G misc, TEST DAILY , Disp: 100 each, Rfl: 1  •  gabapentin (NEURONTIN) 800 MG tablet, TAKE 1 TABLET THREE TIMES DAILY (Patient taking differently: tid), Disp: 90 tablet, Rfl: 0    Objective   Resp 18   Ht 180.3 cm (70.98\")   Wt 113 kg (249 lb 1.6 oz)   BMI 34.76 kg/m²    Physical Exam  Constitutional: Patient is oriented to person, place, and time. Patient appears well-developed and well-nourished.   HENT:Head: Normocephalic and atraumatic.   Eyes: EOM are normal. Pupils are equal, round, and reactive to light.   Neck: Normal range of motion. Neck supple.   Cardiovascular: Normal rate.    Pulmonary/Chest: Effort normal and breath sounds normal.   Abdominal: Soft.   Neurological: Patient is alert and oriented to person, place, and time.   Skin: Skin is warm and dry.   Psychiatric: Patient has a normal mood and affect.   Nursing note and vitals reviewed.       [unfilled]   Left knee: 1-2+ effusion positive medial joint line pain positive Gale sign loss of extension 3 to 5 degrees, loss of flexion 10 degrees, no instability no lateral joint line pain, positive patellofemoral crepitus with pain along the medial patellofemoral compartment.  Neurovascularly intact.    Assessment/Plan   Review of Radiographic Studies:    No new imaging done today.      [place]lim[place]ia     Gonzalo was seen today for " follow-up and pain.    Diagnoses and all orders for this visit:    Complex tear of medial meniscus of left knee as current injury, subsequent encounter       Orthopedic activities reviewed and patient expressed appreciation      Recommendations/Plan:   Work/Activity Status: May perform usual activities as tolerated    Patient agreeable to call or return sooner for any concerns.       Reviewed prior arthroscopic photos demonstrating the pre-and post debridement of his posterior horn medial meniscal tear.  Given his current findings failure to improve will recommend follow-up MRI and discuss arthroscopic treatment next visit.      Impression:  Recurrent medial left knee pain, history of partial posterior horn medial meniscectomy  Plan:  MR left knee, discuss arthroscopic treatment next visit

## 2019-05-17 ENCOUNTER — HOSPITAL ENCOUNTER (OUTPATIENT)
Dept: MRI IMAGING | Facility: HOSPITAL | Age: 64
Discharge: HOME | End: 2019-05-17
Admitting: ORTHOPAEDIC SURGERY

## 2019-05-17 PROCEDURE — 73721 MRI JNT OF LWR EXTRE W/O DYE: CPT

## 2019-06-03 ENCOUNTER — LAB (OUTPATIENT)
Dept: LAB | Facility: HOSPITAL | Age: 64
End: 2019-06-03

## 2019-06-03 ENCOUNTER — TRANSCRIBE ORDERS (OUTPATIENT)
Dept: LAB | Facility: HOSPITAL | Age: 64
End: 2019-06-03

## 2019-06-03 DIAGNOSIS — R73.9 BLOOD GLUCOSE ELEVATED: ICD-10-CM

## 2019-06-03 DIAGNOSIS — D50.8 IRON DEFICIENCY ANEMIA SECONDARY TO INADEQUATE DIETARY IRON INTAKE: Primary | ICD-10-CM

## 2019-06-03 DIAGNOSIS — D50.8 IRON DEFICIENCY ANEMIA SECONDARY TO INADEQUATE DIETARY IRON INTAKE: ICD-10-CM

## 2019-06-03 LAB
DEPRECATED RDW RBC AUTO: 62.6 FL (ref 37–54)
ERYTHROCYTE [DISTWIDTH] IN BLOOD BY AUTOMATED COUNT: 21.9 % (ref 12.3–15.4)
HBA1C MFR BLD: 6.1 % (ref 3–6)
HCT VFR BLD AUTO: 39.1 % (ref 37.5–51)
HGB BLD-MCNC: 11.9 G/DL (ref 13–17.7)
IRON 24H UR-MRATE: 53 MCG/DL (ref 37–181)
MCH RBC QN AUTO: 24.5 PG (ref 26.6–33)
MCHC RBC AUTO-ENTMCNC: 30.4 G/DL (ref 31.5–35.7)
MCV RBC AUTO: 80.6 FL (ref 79–97)
PLATELET # BLD AUTO: 193 10*3/MM3 (ref 140–450)
PMV BLD AUTO: 11 FL (ref 6–12)
RBC # BLD AUTO: 4.85 10*6/MM3 (ref 4.14–5.8)
WBC NRBC COR # BLD: 6.54 10*3/MM3 (ref 3.4–10.8)

## 2019-06-03 PROCEDURE — 83540 ASSAY OF IRON: CPT

## 2019-06-03 PROCEDURE — 36415 COLL VENOUS BLD VENIPUNCTURE: CPT

## 2019-06-03 PROCEDURE — 85027 COMPLETE CBC AUTOMATED: CPT

## 2019-06-03 PROCEDURE — 83036 HEMOGLOBIN GLYCOSYLATED A1C: CPT

## 2019-06-11 ENCOUNTER — TELEPHONE (OUTPATIENT)
Dept: ORTHOPEDIC SURGERY | Facility: CLINIC | Age: 64
End: 2019-06-11

## 2019-06-11 NOTE — TELEPHONE ENCOUNTER
Spoke with patient and told him per [employer] policy he would  have to come in for an appointment to get his results, and he told me he wasn't paying a copay and wasting his time to be told he had arthritis because he seen the results on my chart

## 2019-07-26 ENCOUNTER — OFFICE VISIT (OUTPATIENT)
Dept: NEUROLOGY | Facility: CLINIC | Age: 64
End: 2019-07-26

## 2019-07-26 VITALS
DIASTOLIC BLOOD PRESSURE: 62 MMHG | HEART RATE: 63 BPM | OXYGEN SATURATION: 93 % | TEMPERATURE: 96.6 F | BODY MASS INDEX: 34.74 KG/M2 | SYSTOLIC BLOOD PRESSURE: 100 MMHG | WEIGHT: 249 LBS

## 2019-07-26 DIAGNOSIS — G47.19 EXCESSIVE DAYTIME SLEEPINESS: ICD-10-CM

## 2019-07-26 DIAGNOSIS — G47.33 OBSTRUCTIVE SLEEP APNEA: Primary | ICD-10-CM

## 2019-07-26 DIAGNOSIS — G47.34 NOCTURNAL OXYGEN DESATURATION: ICD-10-CM

## 2019-07-26 PROCEDURE — 99213 OFFICE O/P EST LOW 20 MIN: CPT | Performed by: PSYCHIATRY & NEUROLOGY

## 2019-07-26 RX ORDER — GABAPENTIN 800 MG/1
800 TABLET ORAL 3 TIMES DAILY
Qty: 90 TABLET | Refills: 0 | Status: SHIPPED | OUTPATIENT
Start: 2019-07-26 | End: 2019-07-31 | Stop reason: SDUPTHER

## 2019-07-26 RX ORDER — LATANOPROST 50 UG/ML
1 SOLUTION/ DROPS OPHTHALMIC NIGHTLY
COMMUNITY
End: 2022-06-27

## 2019-07-26 NOTE — PROGRESS NOTES
[facility] NEUROLOGY [place] CONSULTATION   History of Present Illness     Date: 7/30/2019    Patient Identification  Gonzalo Oates is a 64 y.o. male.    Patient information was obtained from patient.  History/Exam limitations: none.    CONSULTATION requested by: Millie Madrid MD      Chief Complaint   Follow-up (Pt states he's here for a 3 month follow up on CPAP )      History of Present Illness   Patient is a pleasant 64-year-old referred to [facility] neurology [place] for evaluation of obstructive sleep apnea.  Patient reports to be compliant using nasal CPAP.  Patient had no complaints today visit patient reports to be more awake and alert in the daytime.      PMH:   Past Medical History:   Diagnosis Date   • Ankle fracture     left 02/2015   • Anxiety    • Arthritis    • Asthma    • BPH (benign prostatic hyperplasia)    • Cataract    • Chronic back pain    • Chronic sinusitis    • Colon polyps    • Constipation     OPIOD INDUCED   • CTS (carpal tunnel syndrome)    • Deep vein thrombosis (DVT) of iliac vein (CMS/HCC)    • Depression    • Diabetes mellitus (CMS/HCC)     PRE DIABETES NO MEDS   • Difficulty walking    • Erectile dysfunction    • GERD (gastroesophageal reflux disease)    • Headache, tension-type    • Herniated disc, cervical    • HL (hearing loss)    • Hyperlipidemia    • Hypertension    • IBS (irritable bowel syndrome)    • Left knee pain    • Lumbar herniated disc    • Memory loss    • Migraine headache    • Mitral valve prolapse    • Movement disorder    • Narcolepsy    • Obesity    • KO (obstructive sleep apnea)     non compliant with CPAP    • Peripheral vascular disease (CMS/HCC)    • Prediabetes    • Restless leg     supressed with narcotics    • Sleep apnea    • Snoring    • SOB (shortness of breath)    • Umbilical hernia    • UTI (urinary tract infection)    • Varicose veins    • Venous insufficiency    • Wears glasses        Past Surgical History:   Past Surgical  History:   Procedure Laterality Date   • APPENDECTOMY     • BACK SURGERY     • BACK SURGERY     • CEREBRAL ANGIOGRAM     • FRACTURE SURGERY      LEFT ANKLE   • KNEE ARTHROSCOPY Left 6/27/2018    Procedure: diagnostic arthroscopy partial medial meniscectomy or other procedures as necessary, left knee;  Surgeon: Tj Hoffman MD;  Location: [facility];  Service: Orthopedics   • LAMINECTOMY     • SINUS SURGERY     • TONSILLECTOMY     • ULNAR NERVE TRANSPOSITION     • VASCULAR SURGERY     • VASECTOMY     • WISDOM TOOTH EXTRACTION         Family Hisotry:   Family History   Problem Relation Age of Onset   • Cancer Other    • Diabetes Other    • Heart disease Other    • Hypertension Other    • Endocrine tumor Mother    • Anxiety disorder Mother    • Arthritis Mother    • Hypertension Mother    • Seizures Mother         Due to brain surgery   • Heart attack Father    • Hypertension Father    • Glaucoma Father    • Cancer Maternal Grandmother    • Heart attack Paternal Grandfather    • Hypertension Brother    • Neuropathy Brother    • Hypertension Sister    • Migraines Sister        Social History:   Social History     Socioeconomic History   • Marital status:      Spouse name: Not on file   • Number of children: Not on file   • Years of education: Not on file   • Highest education level: Not on file   Tobacco Use   • Smoking status: Never Smoker   • Smokeless tobacco: Never Used   • Tobacco comment: Never used.   Substance and Sexual Activity   • Alcohol use: Yes     Types: 2 Shots of liquor per week     Comment: per week   • Drug use: No   • Sexual activity: No       Medications:   Current Outpatient Medications   Medication Sig Dispense Refill   • butalbital-acetaminophen-caffeine (FIORICET) -40 MG capsule capsule Take 1 capsule by mouth as needed.     • chlorthalidone (HYGROTON) 25 MG tablet Take 25 mg by mouth.     • clonazePAM (KLONOPIN) 0.5 MG tablet Take 1 tablet by mouth 2 (Two) Times a Day As Needed  for Anxiety. 60 tablet 0   • cyclobenzaprine (FLEXERIL) 10 MG tablet Take 1 tablet by mouth 3 (Three) Times a Day As Needed for Muscle Spasms. 90 tablet 3   • escitalopram (LEXAPRO) 5 MG tablet 10 mg.     • gabapentin (NEURONTIN) 800 MG tablet Take 1 tablet by mouth 3 (Three) Times a Day. 90 tablet 0   • latanoprost (XALATAN) 0.005 % ophthalmic solution 1 drop Every Night.     • lisinopril (PRINIVIL,ZESTRIL) 20 MG tablet Take 20 mg by mouth Daily.     • omeprazole (priLOSEC) 40 MG capsule TAKE 1 CAPSULE EVERY DAY 90 capsule 0   • pravastatin (PRAVACHOL) 40 MG tablet TAKE 1 TABLET EVERY EVENING 90 tablet 0   • spironolactone (ALDACTONE) 25 MG tablet TAKE 1 TABLET EVERY DAY 90 tablet 1   • SUMAtriptan (IMITREX) 50 MG tablet Take 50 mg by mouth.     • betamethasone dipropionate (DIPROLENE) 0.05 % ointment Apply 1 application topically 2 (Two) Times a Day.     • DULoxetine (CYMBALTA) 60 MG capsule Take 1 capsule by mouth Daily. 90 capsule 1   • Gabapentin Enacarbil ER (HORIZANT) 600 MG tablet controlled-release Take 600 mg by mouth Daily. One pill at 5 PM with food 30 tablet 3   • glucose monitor monitoring kit As directed 1 each 0   • ketoconazole (NIZORAL) 2 % cream Apply 1 application topically Daily.     • nystatin-triamcinolone (MYCOLOG II) cream Apply  topically 3 (three) times a day.     • tiZANidine (ZANAFLEX) 2 MG tablet Take 2 mg by mouth At Night As Needed for Muscle Spasms.     • tolnaftate (TINACTIN) 1 % external solution Apply 1 application topically Every Night.     • TRUE METRIX BLOOD GLUCOSE TEST test strip TEST EVERY  each 1   • TRUEPLUS LANCETS 28G misc TEST DAILY  100 each 1     No current facility-administered medications for this visit.        Allergy:   Allergies   Allergen Reactions   • Calcium Channel Blockers Swelling     Lower extremity edema       • Adhesive Tape Rash   • Chlorhexidine Rash   • Nickel Swelling   • Zinc Oxide Rash and Unknown (See Comments)     SENSITIVITY ,CAUSES  RASH         Review of Systems:  Review of Systems   Constitutional: Positive for fatigue. Negative for chills and fever.   HENT: Negative for congestion, ear pain, hearing loss, rhinorrhea and sore throat.    Eyes: Negative for pain, discharge and redness.   Respiratory: Positive for apnea. Negative for cough, shortness of breath, wheezing and stridor.    Cardiovascular: Negative for chest pain, palpitations and leg swelling.   Gastrointestinal: Negative for abdominal pain, constipation, nausea and vomiting.   Endocrine: Negative for cold intolerance, heat intolerance and polyphagia.   Genitourinary: Negative for dysuria, flank pain, frequency and urgency.   Musculoskeletal: Negative for joint swelling, myalgias, neck pain and neck stiffness.   Skin: Negative for pallor, rash and wound.   Allergic/Immunologic: Negative for environmental allergies.   Neurological: Negative for dizziness, tremors, seizures, syncope, facial asymmetry, speech difficulty, weakness, light-headedness, numbness and headaches.   Hematological: Negative for adenopathy.   Psychiatric/Behavioral: Positive for sleep disturbance. Negative for confusion and hallucinations. The patient is not nervous/anxious.        Physical Exam     Vitals:    07/26/19 1117   BP: 100/62   Pulse: 63   Temp: 96.6 °F (35.9 °C)   SpO2: 93%   Weight: 113 kg (249 lb)     GENERAL: Patient is pleasant, cooperative, appears to be stated age.  Body habitus is endomorphic.  SKIN AND EXTREMITIES:  No skin rashes or lesions are noted.  No cyanosis, clubbing or edema of the extremities.    HEAD:  Head is normocephalic and atraumatic.    NECK: Nontender without thyromegaly or adenopathy.  Carotid upstrokes are 1+/4.  No cranial or cervical bruits.  The neck is supple with a full range of motion.   ENT: Palate elevates symmetrically.  No evidence of high arch palate.  Tongue midline.  No erythema in posterior pharynx.  Mallampati Classification Class III   CARDIOVASCULAR:   Regular rate and rhythm with normal S1 and S2 without rub or gallop.  RESPIRATORY:  Clear to auscultation without wheezes or crackle   ABDOMEN:  Soft and nontender, positive bowel sound without hepatosplenomegaly  BACK:  Back is straight without midline defect.    PSYCH:  Higher cortical function/mental status:  The patient is alert.  The patient is oriented x3 to time, place and person.  Recent and the remote memory appear normal.  The patient has a good fund of knowledge.  There is no visual or auditory hallucination or suicidal or homicidal ideation.  SPEECH:There is no gross evidence of aphasia, dysarthria or agnosia.      CRANIAL NERVES:  Pupils are 4mm, equal round reactive to light, reacting briskly to 2mm without afferent pupillary defect.  Visual fields are intact to confrontation testing.  Funduscopic examination reveals sharp disc margins with normal vasculature.  No papilledema, hemorrhages or exudates.  Extraocular movements are full and smooth with normal pursuits and saccades.  No nystagmus noted.  The face is symmetric. Palate elevates symmetrically, Tongue midline, positive gag reflex. The remainder of the cranial nerves are intact and symmetrical.    MOTOR: Strength is 5/5 throughout with normal tone and bulk with the following exceptions, 4/5 intrinsic muscles of the hands and feet.  No involuntary movements noted.    Deep Tendon Reflexes: are 2/4 and symmetrical in the upper extremities, 2/4 and symmetrical at the knees and 1/4 and symmetrical at the Achilles tendon.  Plantar responses were down-going bilaterally.    SENSATION:  Intact to pinprick, light touch, vibration and proprioception.  Coordination:  The patient normally performs finger-nose-finger, heel-to-knee-to-shin and rapid alternating movements in symmetrical fashion.    COORDINATION AND GAIT:  The patient walks with a narrow-based gait.  Patient is able to heel-toe and tandem walk forward and backwards without difficulty.  Romberg  and monopedal  Romberg are negative.    MUSCULOSKELETAL: Range of motion normal, no clubbing, cyanosis, or edema.  No joint swelling.            Records Reviewed: I have personally reviewed his previous medical record.    Gonzalo was seen today for follow-up.    Diagnoses and all orders for this visit:    Obstructive sleep apnea    Excessive daytime sleepiness    Nocturnal oxygen desaturation    Other orders  -     gabapentin (NEURONTIN) 800 MG tablet; Take 1 tablet by mouth 3 (Three) Times a Day.      Discussion:  In summary, 64-year-old referred to [facility] neurology [place] for evaluation of obstructive sleep apnea.  Patient reports to be compliant using nasal CPAP.  Patient had no complaints today visit patient reports to be more awake and alert in the daytime.      I have counseled patient extensively on Sleep Hygiene including regular sleep wake schedule and stimulus control therapy.  I have also discussed the importance of weight reduction because 10% reduction in body weight can reduce sleep apnea by 50 %. We have also discussed abstaining from smoking and drinking.  I have explained to patient that obstructive apnea episode is defined as the absence of airflow for at least 10 seconds.  Sleep apnea is usually accompanied by snoring, disturbed sleep, and daytime sleepiness. Patients with micrognathia, retrognathia, enlarged tonsils, tongue enlargement, and acromegaly are especially predisposed to obstructive sleep apnea. Abnormalities or weakness in the muscles can also contribute to obstructive sleep apnea. Obesity can also contribute to sleep apnea.     Sleep apnea can lead to a number of complications, ranging from daytime sleepiness to possible increased risk of cardiovascular risks.   Daytime sleepiness is the most serious.  Daytime sleepiness can also increase the risk for accident-related injuries. Several studies have suggested that people with sleep apnea have two to three times as many car  accidents, and five to seven times the risk for multiple accidents.   A number of cardiovascular diseases -- including high blood pressure, heart failure, stroke, and heart arrhythmias -- have an association with obstructive sleep apnea.   Up to a third of patients with heart failure also have sleep apnea. Both central and obstructive sleep apnea are linked with heart failure. Obstructive sleep apnea is also noted to be associated with type 2 diabetes according to Dr. Castro at [facility].  The best treatment for symptomatic obstructive sleep apnea is continuous positive airflow pressure (CPAP). Bilevel positive airway pressure (BPAP) systems may be particularly helpful for patients with coexisting lung disease and those with excessive levels of carbon dioxide.  Other treatment options including UPPP surgery, LAUP surgery, radiofrequency somnoplasty and dental appliances such [place] or [place].    This Document is signed by Kenneth Chan MD, FAAN, FAASM July 30, 20198:47 PM

## 2019-08-05 ENCOUNTER — LAB (OUTPATIENT)
Dept: LAB | Facility: HOSPITAL | Age: 64
End: 2019-08-05

## 2019-08-05 ENCOUNTER — OFFICE VISIT (OUTPATIENT)
Dept: ORTHOPEDIC SURGERY | Facility: CLINIC | Age: 64
End: 2019-08-05

## 2019-08-05 ENCOUNTER — TRANSCRIBE ORDERS (OUTPATIENT)
Dept: LAB | Facility: HOSPITAL | Age: 64
End: 2019-08-05

## 2019-08-05 VITALS — BODY MASS INDEX: 34.86 KG/M2 | WEIGHT: 249 LBS | HEIGHT: 71 IN | RESPIRATION RATE: 18 BRPM

## 2019-08-05 DIAGNOSIS — R35.1 FREQUENT URINATION AT NIGHT: ICD-10-CM

## 2019-08-05 DIAGNOSIS — S83.242D TEAR OF MEDIAL MENISCUS OF LEFT KNEE, CURRENT, UNSPECIFIED TEAR TYPE, SUBSEQUENT ENCOUNTER: ICD-10-CM

## 2019-08-05 DIAGNOSIS — D50.0 ANEMIA DUE TO BLOOD LOSS: Primary | ICD-10-CM

## 2019-08-05 DIAGNOSIS — M17.10 ARTHRITIS OF KNEE: Primary | ICD-10-CM

## 2019-08-05 DIAGNOSIS — D50.0 ANEMIA DUE TO BLOOD LOSS: ICD-10-CM

## 2019-08-05 LAB
DEPRECATED RDW RBC AUTO: 53.2 FL (ref 37–54)
ERYTHROCYTE [DISTWIDTH] IN BLOOD BY AUTOMATED COUNT: 17 % (ref 12.3–15.4)
HCT VFR BLD AUTO: 40.6 % (ref 37.5–51)
HGB BLD-MCNC: 12.7 G/DL (ref 13–17.7)
MCH RBC QN AUTO: 27 PG (ref 26.6–33)
MCHC RBC AUTO-ENTMCNC: 31.3 G/DL (ref 31.5–35.7)
MCV RBC AUTO: 86.4 FL (ref 79–97)
PLATELET # BLD AUTO: 172 10*3/MM3 (ref 140–450)
PMV BLD AUTO: 10 FL (ref 6–12)
PSA SERPL-MCNC: 0.64 NG/ML (ref 0.06–4)
RBC # BLD AUTO: 4.7 10*6/MM3 (ref 4.14–5.8)
WBC NRBC COR # BLD: 5.92 10*3/MM3 (ref 3.4–10.8)

## 2019-08-05 PROCEDURE — G0103 PSA SCREENING: HCPCS

## 2019-08-05 PROCEDURE — 99214 OFFICE O/P EST MOD 30 MIN: CPT | Performed by: ORTHOPAEDIC SURGERY

## 2019-08-05 PROCEDURE — 36415 COLL VENOUS BLD VENIPUNCTURE: CPT

## 2019-08-05 PROCEDURE — 85027 COMPLETE CBC AUTOMATED: CPT

## 2019-08-05 RX ORDER — OXYCODONE AND ACETAMINOPHEN 10; 325 MG/1; MG/1
10 TABLET ORAL
Refills: 0 | COMMUNITY
Start: 2019-08-03 | End: 2022-06-27

## 2019-08-05 NOTE — PROGRESS NOTES
Subjective   Patient ID: Gonzalo Oates is a 64 y.o. male  Follow-up and Pain of the Left Knee (Patient is here today for bilateral knee pain and his left knee MRI results. He states the right knee pain started about 3 months ago. He denies any injury or trauma to the right knee. The left knee has not got better since last appointment.) and Pain of the Right Knee             History of Present Illness    Patient complains of bilateral knee pain the left is stiff and sore especially with extending at the right one hurts to bend it no recent fall or injury, status post left knee arthroscopic debridement posterior horn meniscal tear 2018 had recurrent symptoms late last year and early this year April this year repeat MR left knee confirmed degenerative changes medial compartment signal to changes posterior horn medial meniscus.  His right knee continues to hurt with bending the left one hurts more when he kneels anteriorly directly on it.    Has history of recent lumbar surgery recovered and is anticipating two-level cervical disc surgery waiting insurance approval to be done in [place].    Patient currently under care with PCP for chronic pain with narcotic management    Review of Systems   Constitutional: Negative for fever.   HENT: Negative for voice change.    Eyes: Negative for visual disturbance.   Respiratory: Negative for shortness of breath.    Cardiovascular: Negative for chest pain.   Gastrointestinal: Negative for abdominal pain.   Genitourinary: Negative for dysuria.   Musculoskeletal: Positive for arthralgias. Negative for gait problem and joint swelling.   Skin: Negative for rash.   Neurological: Negative for speech difficulty.   Hematological: Does not bruise/bleed easily.   Psychiatric/Behavioral: Negative for confusion.       Past Medical History:   Diagnosis Date   • Ankle fracture     left 02/2015   • Anxiety    • Arthritis    • Asthma    • BPH (benign prostatic hyperplasia)    • Cataract    •  Chronic back pain    • Chronic sinusitis    • Colon polyps    • Constipation     OPIOD INDUCED   • CTS (carpal tunnel syndrome)    • Deep vein thrombosis (DVT) of iliac vein (CMS/HCC)    • Depression    • Diabetes mellitus (CMS/HCC)     PRE DIABETES NO MEDS   • Difficulty walking    • Erectile dysfunction    • GERD (gastroesophageal reflux disease)    • Headache, tension-type    • Herniated disc, cervical    • HL (hearing loss)    • Hyperlipidemia    • Hypertension    • IBS (irritable bowel syndrome)    • Left knee pain    • Lumbar herniated disc    • Memory loss    • Migraine headache    • Mitral valve prolapse    • Movement disorder    • Narcolepsy    • Obesity    • KO (obstructive sleep apnea)     non compliant with CPAP    • Peripheral vascular disease (CMS/HCC)    • Prediabetes    • Restless leg     supressed with narcotics    • Sleep apnea    • Snoring    • SOB (shortness of breath)    • Umbilical hernia    • UTI (urinary tract infection)    • Varicose veins    • Venous insufficiency    • Wears glasses         Past Surgical History:   Procedure Laterality Date   • APPENDECTOMY     • BACK SURGERY     • BACK SURGERY     • CEREBRAL ANGIOGRAM     • FRACTURE SURGERY      LEFT ANKLE   • KNEE ARTHROSCOPY Left 6/27/2018    Procedure: diagnostic arthroscopy partial medial meniscectomy or other procedures as necessary, left knee;  Surgeon: Tj Hoffman MD;  Location: [facility];  Service: Orthopedics   • LAMINECTOMY     • SINUS SURGERY     • TONSILLECTOMY     • ULNAR NERVE TRANSPOSITION     • VASCULAR SURGERY     • VASECTOMY     • WISDOM TOOTH EXTRACTION         Family History   Problem Relation Age of Onset   • Cancer Other    • Diabetes Other    • Heart disease Other    • Hypertension Other    • Endocrine tumor Mother    • Anxiety disorder Mother    • Arthritis Mother    • Hypertension Mother    • Seizures Mother         Due to brain surgery   • Heart attack Father    • Hypertension Father    • Glaucoma Father    •  Cancer Maternal Grandmother    • Heart attack Paternal Grandfather    • Hypertension Brother    • Neuropathy Brother    • Hypertension Sister    • Migraines Sister        Social History     Socioeconomic History   • Marital status:      Spouse name: Not on file   • Number of children: Not on file   • Years of education: Not on file   • Highest education level: Not on file   Tobacco Use   • Smoking status: Never Smoker   • Smokeless tobacco: Never Used   • Tobacco comment: Never used.   Substance and Sexual Activity   • Alcohol use: Yes     Types: 2 Shots of liquor per week     Comment: per week   • Drug use: No   • Sexual activity: No       I have reviewed all of the above social hx, family hx, surgical hx, medications, allergies & ROS and confirm that it is accurate.    Allergies   Allergen Reactions   • Calcium Channel Blockers Swelling     Lower extremity edema       • Adhesive Tape Rash   • Chlorhexidine Rash   • Nickel Swelling   • Zinc Oxide Rash and Unknown (See Comments)     SENSITIVITY ,CAUSES RASH           Current Outpatient Medications:   •  betamethasone dipropionate (DIPROLENE) 0.05 % ointment, Apply 1 application topically 2 (Two) Times a Day., Disp: , Rfl:   •  butalbital-acetaminophen-caffeine (FIORICET) -40 MG capsule capsule, Take 1 capsule by mouth as needed., Disp: , Rfl:   •  chlorthalidone (HYGROTON) 25 MG tablet, Take 25 mg by mouth., Disp: , Rfl:   •  clonazePAM (KLONOPIN) 0.5 MG tablet, Take 1 tablet by mouth 2 (Two) Times a Day As Needed for Anxiety., Disp: 60 tablet, Rfl: 0  •  cyclobenzaprine (FLEXERIL) 10 MG tablet, Take 1 tablet by mouth 3 (Three) Times a Day As Needed for Muscle Spasms., Disp: 90 tablet, Rfl: 3  •  DULoxetine (CYMBALTA) 60 MG capsule, Take 1 capsule by mouth Daily., Disp: 90 capsule, Rfl: 1  •  escitalopram (LEXAPRO) 5 MG tablet, 10 mg., Disp: , Rfl:   •  gabapentin (NEURONTIN) 800 MG tablet, Take 1 tablet by mouth 3 (Three) Times a Day., Disp: 90 tablet,  "Rfl: 0  •  Gabapentin Enacarbil ER (HORIZANT) 600 MG tablet controlled-release, Take 600 mg by mouth Daily. One pill at 5 PM with food, Disp: 30 tablet, Rfl: 3  •  glucose monitor monitoring kit, As directed, Disp: 1 each, Rfl: 0  •  ketoconazole (NIZORAL) 2 % cream, Apply 1 application topically Daily., Disp: , Rfl:   •  latanoprost (XALATAN) 0.005 % ophthalmic solution, 1 drop Every Night., Disp: , Rfl:   •  lisinopril (PRINIVIL,ZESTRIL) 20 MG tablet, Take 20 mg by mouth Daily., Disp: , Rfl:   •  nystatin-triamcinolone (MYCOLOG II) cream, Apply  topically 3 (three) times a day., Disp: , Rfl:   •  omeprazole (priLOSEC) 40 MG capsule, TAKE 1 CAPSULE EVERY DAY, Disp: 90 capsule, Rfl: 0  •  oxyCODONE-acetaminophen (PERCOCET) 5-325 MG per tablet, TK 1 T BID PRN, Disp: , Rfl: 0  •  pravastatin (PRAVACHOL) 40 MG tablet, TAKE 1 TABLET EVERY EVENING, Disp: 90 tablet, Rfl: 0  •  spironolactone (ALDACTONE) 25 MG tablet, TAKE 1 TABLET EVERY DAY, Disp: 90 tablet, Rfl: 1  •  SUMAtriptan (IMITREX) 50 MG tablet, Take 50 mg by mouth., Disp: , Rfl:   •  tiZANidine (ZANAFLEX) 2 MG tablet, Take 2 mg by mouth At Night As Needed for Muscle Spasms., Disp: , Rfl:   •  tolnaftate (TINACTIN) 1 % external solution, Apply 1 application topically Every Night., Disp: , Rfl:   •  TRUE METRIX BLOOD GLUCOSE TEST test strip, TEST EVERY DAY, Disp: 100 each, Rfl: 1  •  TRUEPLUS LANCETS 28G misc, TEST DAILY , Disp: 100 each, Rfl: 1    Objective   Resp 18   Ht 180.3 cm (70.98\")   Wt 113 kg (249 lb)   BMI 34.75 kg/m²    Physical Exam  Constitutional: Patient is oriented to person, place, and time. Patient appears well-developed and well-nourished.   HENT:Head: Normocephalic and atraumatic.   Eyes: EOM are normal. Pupils are equal, round, and reactive to light.   Neck: Normal range of motion. Neck supple.   Cardiovascular: Normal rate.    Pulmonary/Chest: Effort normal and breath sounds normal.   Abdominal: Soft.   Neurological: Patient is alert and " oriented to person, place, and time.   Skin: Skin is warm and dry.   Psychiatric: Patient has a normal mood and affect.   Nursing note and vitals reviewed.       [unfilled]   Left knee: Trace effusion positive anteromedial joint line pain positive Gale sign loss of extension 10 degrees flexion 130 good ligament stability no edema in the ankle Homans sign negative for calf pain negative straight leg raising for reproduction of left lower leg pain.    Right knee: No effusion full extension flexion 140 good stability very slight tenderness anteromedial joint line Gale sign positive for anteromedial joint line pain minimal calf swelling no edema in the ankle Homans sign negative straight leg raising negative for reproduction of right lower leg pain.    Assessment/Plan   Review of Radiographic Studies:    Radiographic images today of affected area I personally viewed and showed no sign of acute fracture or dislocation.      Procedures     Gonzalo was seen today for follow-up, pain and pain.    Diagnoses and all orders for this visit:    Arthritis of knee  -     XR Knee 1 or 2 View Right       Orthopedic activities reviewed and patient expressed appreciation and Using illustrations and models, the nature of the pathology was explained to the patient      Recommendations/Plan:   Work/Activity Status: May perform usual activities as tolerated    Patient agreeable to call or return sooner for any concerns.             Impression:  History of left knee partial posterior horn medial meniscectomy June 2018 with recurrent tear minimal symptoms slight loss of motion, right medial knee pain rule out medial meniscal tear, history of multi-segmental anterior and posterior lumbar surgery awaiting cervical spine surgery     Plan:MR right knee, trial of topical Voltaren gel, review MR findings after study complete

## 2019-08-07 RX ORDER — GABAPENTIN 800 MG/1
800 TABLET ORAL 3 TIMES DAILY
Qty: 90 TABLET | Refills: 0 | Status: SHIPPED | OUTPATIENT
Start: 2019-08-07 | End: 2019-10-21 | Stop reason: SDUPTHER

## 2019-08-27 ENCOUNTER — OFFICE VISIT (OUTPATIENT)
Dept: ORTHOPEDIC SURGERY | Facility: CLINIC | Age: 64
End: 2019-08-27

## 2019-08-27 VITALS — HEIGHT: 71 IN | WEIGHT: 250 LBS | BODY MASS INDEX: 35 KG/M2 | RESPIRATION RATE: 18 BRPM

## 2019-08-27 DIAGNOSIS — M17.10 ARTHRITIS OF KNEE: Primary | ICD-10-CM

## 2019-08-27 PROCEDURE — 20610 DRAIN/INJ JOINT/BURSA W/O US: CPT | Performed by: ORTHOPAEDIC SURGERY

## 2019-08-27 PROCEDURE — 99214 OFFICE O/P EST MOD 30 MIN: CPT | Performed by: ORTHOPAEDIC SURGERY

## 2019-08-27 RX ORDER — TRIAMCINOLONE ACETONIDE 40 MG/ML
40 INJECTION, SUSPENSION INTRA-ARTICULAR; INTRAMUSCULAR
Status: COMPLETED | OUTPATIENT
Start: 2019-08-27 | End: 2019-08-27

## 2019-08-27 RX ORDER — LIDOCAINE HYDROCHLORIDE 10 MG/ML
2 INJECTION, SOLUTION INFILTRATION; PERINEURAL
Status: COMPLETED | OUTPATIENT
Start: 2019-08-27 | End: 2019-08-27

## 2019-08-27 RX ADMIN — TRIAMCINOLONE ACETONIDE 40 MG: 40 INJECTION, SUSPENSION INTRA-ARTICULAR; INTRAMUSCULAR at 16:09

## 2019-08-27 RX ADMIN — LIDOCAINE HYDROCHLORIDE 2 ML: 10 INJECTION, SOLUTION INFILTRATION; PERINEURAL at 16:09

## 2019-08-27 NOTE — PROGRESS NOTES
Subjective   Patient ID: Gonzalo Oates is a 64 y.o. male  Follow-up and Pain of the Right Knee (Patient is here today for his MRI results.)             History of Present Illness  Patient complains of continued bilateral knee pain currently the left hurts more than the right recent MRI confirmed degenerative tearing right knee medial meniscus associate with medial compartment osteoarthritis.  He has not had recent injections to either knee is status post left knee arthroscopic debridement meniscal tear medial June of last year.  He is currently undergoing treatment by the orthospine section in [place] for cervical spinal stenosis they recommended cervical laminoplasty and he is awaiting insurance authorization to have that done in [place] or [place].  Otherwise medical health is been stable.      Review of Systems   Constitutional: Negative for fever.   HENT: Negative for voice change.    Eyes: Negative for visual disturbance.   Respiratory: Negative for shortness of breath.    Cardiovascular: Negative for chest pain.   Gastrointestinal: Negative for abdominal pain.   Genitourinary: Negative for dysuria.   Musculoskeletal: Positive for arthralgias. Negative for gait problem and joint swelling.   Skin: Negative for rash.   Neurological: Negative for speech difficulty.   Hematological: Does not bruise/bleed easily.   Psychiatric/Behavioral: Negative for confusion.       Past Medical History:   Diagnosis Date   • Ankle fracture     left 02/2015   • Anxiety    • Arthritis    • Asthma    • BPH (benign prostatic hyperplasia)    • Cataract    • Chronic back pain    • Chronic sinusitis    • Colon polyps    • Constipation     OPIOD INDUCED   • CTS (carpal tunnel syndrome)    • Deep vein thrombosis (DVT) of iliac vein (CMS/HCC)    • Depression    • Diabetes mellitus (CMS/HCC)     PRE DIABETES NO MEDS   • Difficulty walking    • Erectile dysfunction    • GERD (gastroesophageal reflux disease)    • Headache,  tension-type    • Herniated disc, cervical    • HL (hearing loss)    • Hyperlipidemia    • Hypertension    • IBS (irritable bowel syndrome)    • Left knee pain    • Lumbar herniated disc    • Memory loss    • Migraine headache    • Mitral valve prolapse    • Movement disorder    • Narcolepsy    • Obesity    • KO (obstructive sleep apnea)     non compliant with CPAP    • Peripheral vascular disease (CMS/HCC)    • Prediabetes    • Restless leg     supressed with narcotics    • Sleep apnea    • Snoring    • SOB (shortness of breath)    • Umbilical hernia    • UTI (urinary tract infection)    • Varicose veins    • Venous insufficiency    • Wears glasses         Past Surgical History:   Procedure Laterality Date   • APPENDECTOMY     • BACK SURGERY     • BACK SURGERY     • CEREBRAL ANGIOGRAM     • FRACTURE SURGERY      LEFT ANKLE   • KNEE ARTHROSCOPY Left 6/27/2018    Procedure: diagnostic arthroscopy partial medial meniscectomy or other procedures as necessary, left knee;  Surgeon: Tj Hoffman MD;  Location: [facility];  Service: Orthopedics   • LAMINECTOMY     • SINUS SURGERY     • TONSILLECTOMY     • ULNAR NERVE TRANSPOSITION     • VASCULAR SURGERY     • VASECTOMY     • WISDOM TOOTH EXTRACTION         Family History   Problem Relation Age of Onset   • Cancer Other    • Diabetes Other    • Heart disease Other    • Hypertension Other    • Endocrine tumor Mother    • Anxiety disorder Mother    • Arthritis Mother    • Hypertension Mother    • Seizures Mother         Due to brain surgery   • Heart attack Father    • Hypertension Father    • Glaucoma Father    • Cancer Maternal Grandmother    • Heart attack Paternal Grandfather    • Hypertension Brother    • Neuropathy Brother    • Hypertension Sister    • Migraines Sister        Social History     Socioeconomic History   • Marital status:      Spouse name: Not on file   • Number of children: Not on file   • Years of education: Not on file   • Highest education  level: Not on file   Tobacco Use   • Smoking status: Never Smoker   • Smokeless tobacco: Never Used   • Tobacco comment: Never used.   Substance and Sexual Activity   • Alcohol use: Yes     Types: 2 Shots of liquor per week     Comment: per week   • Drug use: No   • Sexual activity: No       I have reviewed all of the above social hx, family hx, surgical hx, medications, allergies & ROS and confirm that it is accurate.    Allergies   Allergen Reactions   • Calcium Channel Blockers Swelling     Lower extremity edema       • Adhesive Tape Rash   • Chlorhexidine Rash   • Nickel Swelling   • Zinc Oxide Rash and Unknown (See Comments)     SENSITIVITY ,CAUSES RASH           Current Outpatient Medications:   •  butalbital-acetaminophen-caffeine (FIORICET) -40 MG capsule capsule, Take 1 capsule by mouth as needed., Disp: , Rfl:   •  chlorthalidone (HYGROTON) 25 MG tablet, Take 25 mg by mouth., Disp: , Rfl:   •  clonazePAM (KLONOPIN) 0.5 MG tablet, Take 1 tablet by mouth 2 (Two) Times a Day As Needed for Anxiety., Disp: 60 tablet, Rfl: 0  •  cyclobenzaprine (FLEXERIL) 10 MG tablet, Take 1 tablet by mouth 3 (Three) Times a Day As Needed for Muscle Spasms., Disp: 90 tablet, Rfl: 3  •  diclofenac (VOLTAREN) 1 % gel gel, Apply 4 g topically to the appropriate area as directed 2 (Two) Times a Day., Disp: 1 tube, Rfl: 3  •  gabapentin (NEURONTIN) 800 MG tablet, Take 1 tablet by mouth 3 (Three) Times a Day., Disp: 90 tablet, Rfl: 0  •  latanoprost (XALATAN) 0.005 % ophthalmic solution, 1 drop Every Night., Disp: , Rfl:   •  lisinopril (PRINIVIL,ZESTRIL) 20 MG tablet, Take 20 mg by mouth Daily., Disp: , Rfl:   •  omeprazole (priLOSEC) 40 MG capsule, TAKE 1 CAPSULE EVERY DAY, Disp: 90 capsule, Rfl: 0  •  oxyCODONE-acetaminophen (PERCOCET) 5-325 MG per tablet, TK 1 T BID PRN, Disp: , Rfl: 0  •  pravastatin (PRAVACHOL) 40 MG tablet, TAKE 1 TABLET EVERY EVENING, Disp: 90 tablet, Rfl: 0  •  spironolactone (ALDACTONE) 25 MG tablet,  "TAKE 1 TABLET EVERY DAY, Disp: 90 tablet, Rfl: 1  •  SUMAtriptan (IMITREX) 50 MG tablet, Take 50 mg by mouth., Disp: , Rfl:     Objective   Resp 18   Ht 180.3 cm (70.98\")   Wt 113 kg (250 lb)   BMI 34.89 kg/m²    Physical Exam  Constitutional: Patient is oriented to person, place, and time. Patient appears well-developed and well-nourished.   HENT:Head: Normocephalic and atraumatic.   Eyes: EOM are normal. Pupils are equal, round, and reactive to light.   Neck: Normal range of motion. Neck supple.   Cardiovascular: Normal rate.    Pulmonary/Chest: Effort normal and breath sounds normal.   Abdominal: Soft.   Neurological: Patient is alert and oriented to person, place, and time.   Skin: Skin is warm and dry.   Psychiatric: Patient has a normal mood and affect.   Nursing note and vitals reviewed.       [unfilled]   Right knee: Trace effusion point tenderness anteromedial joint line positive Gale's finding ligament exam unremarkable extension -10 flexion 130 calf supple neurovascularly intact positive antalgic gait    Left knee:  Trace effusion point tenderness anteromedial joint line positive Gale's finding ligament exam unremarkable extension -10 flexion 130 calf supple neurovascularly intact positive antalgic gait    Assessment/Plan   Review of Radiographic Studies:    No new imaging done today.  MR study right knee images directly examined confirming degenerative appearance of posterior horn medial meniscus with associated medial compartment osteoarthritis and lateral patellofemoral arthrosis      Large Joint Arthrocentesis: R knee  Date/Time: 8/27/2019 4:09 PM  Consent given by: patient  Site marked: site marked  Timeout: Immediately prior to procedure a time out was called to verify the correct patient, procedure, equipment, support staff and site/side marked as required   Supporting Documentation  Indications: pain   Procedure Details  Location: knee - R knee  Preparation: Patient was prepped and " draped in the usual sterile fashion  Needle size: 22 G  Approach: anterolateral  Medications administered: 40 mg triamcinolone acetonide 40 MG/ML; 2 mL lidocaine 1 %  Patient tolerance: patient tolerated the procedure well with no immediate complications    Large Joint Arthrocentesis: L knee  Date/Time: 8/27/2019 4:09 PM  Consent given by: patient  Site marked: site marked  Timeout: Immediately prior to procedure a time out was called to verify the correct patient, procedure, equipment, support staff and site/side marked as required   Supporting Documentation  Indications: pain   Procedure Details  Location: knee - L knee  Preparation: Patient was prepped and draped in the usual sterile fashion  Needle size: 22 G  Approach: anterolateral  Medications administered: 40 mg triamcinolone acetonide 40 MG/ML; 2 mL lidocaine 1 %  Patient tolerance: patient tolerated the procedure well with no immediate complications           Gonzalo was seen today for follow-up and pain.    Diagnoses and all orders for this visit:    Arthritis of knee       Orthopedic activities reviewed and patient expressed appreciation, Risk, benefits, and merits of treatment alternatives reviewed with the patient and questions answered, The nature of the proposed surgery reviewed with the patient including risks, benefits, rehabilitation, recovery timeframe, and outcome expectations and Using illustrations and models, the nature of the pathology was explained to the patient      Recommendations/Plan:   Work/Activity Status: May perform usual activities as tolerated    Patient agreeable to call or return sooner for any concerns.             Impression:  History of left knee partial posterior horn medial meniscectomy June 2018, right knee medial compartment posterior horn degenerative meniscal tear with arthritis, chronic cervical stenosis awaiting cervical multilevel posterior laminoplasty approval by insurance  Plan:  Return as needed for further  evaluation if right knee medial pain continues despite injection will call to schedule right knee diagnostic arthroscopy posterior horn partial medial meniscectomy or other procedures as indicated

## 2019-10-22 ENCOUNTER — OFFICE VISIT (OUTPATIENT)
Dept: ORTHOPEDIC SURGERY | Facility: CLINIC | Age: 64
End: 2019-10-22

## 2019-10-22 ENCOUNTER — HOSPITAL ENCOUNTER (OUTPATIENT)
Dept: ULTRASOUND IMAGING | Facility: HOSPITAL | Age: 64
Discharge: HOME | End: 2019-10-22
Admitting: ORTHOPAEDIC SURGERY

## 2019-10-22 VITALS — RESPIRATION RATE: 18 BRPM | BODY MASS INDEX: 35 KG/M2 | HEIGHT: 71 IN | WEIGHT: 250 LBS

## 2019-10-22 DIAGNOSIS — M17.10 ARTHRITIS OF KNEE: ICD-10-CM

## 2019-10-22 DIAGNOSIS — M17.10 ARTHRITIS OF KNEE: Primary | ICD-10-CM

## 2019-10-22 PROCEDURE — 93971 EXTREMITY STUDY: CPT

## 2019-10-22 PROCEDURE — 99213 OFFICE O/P EST LOW 20 MIN: CPT | Performed by: ORTHOPAEDIC SURGERY

## 2019-10-22 RX ORDER — LIDOCAINE 50 MG/G
1 PATCH TOPICAL DAILY
Qty: 30 PATCH | Refills: 5 | Status: SHIPPED | OUTPATIENT
Start: 2019-10-22 | End: 2020-07-09

## 2019-10-22 NOTE — PROGRESS NOTES
Subjective   Patient ID: Gonzalo Oates is a 64 y.o. male  Follow-up and Pain of the Left Knee (Patient is here today for increased knee pain, he states the cortisone injection on 08/27/19 helped the right knee but not the left knee.)             History of Present Illness    Patient complains of 3 weeks of increasing pain and swelling in the left lower leg seems to extend from the mid thigh down to the mid calf area  no fall or injury.  Has history of left iliac vein stents placed several years ago followed on an annual basis due for his routine follow-up soon.  Had injections to both knees within the last several months with improvement in the right knee pain and minimal improvement in his left knee pain.    Review of Systems   Constitutional: Negative for fever.   HENT: Negative for dental problem and voice change.    Eyes: Negative for visual disturbance.   Respiratory: Negative for shortness of breath.    Cardiovascular: Negative for chest pain.   Gastrointestinal: Negative for abdominal pain.   Genitourinary: Negative for dysuria.   Musculoskeletal: Positive for arthralgias. Negative for gait problem and joint swelling.   Skin: Negative for rash.   Neurological: Negative for speech difficulty.   Hematological: Does not bruise/bleed easily.   Psychiatric/Behavioral: Negative for confusion.       Past Medical History:   Diagnosis Date   • Ankle fracture     left 02/2015   • Anxiety    • Arthritis    • Asthma    • BPH (benign prostatic hyperplasia)    • Cataract    • Chronic back pain    • Chronic sinusitis    • Colon polyps    • Constipation     OPIOD INDUCED   • CTS (carpal tunnel syndrome)    • Deep vein thrombosis (DVT) of iliac vein (CMS/HCC)    • Depression    • Diabetes mellitus (CMS/HCC)     PRE DIABETES NO MEDS   • Difficulty walking    • Erectile dysfunction    • GERD (gastroesophageal reflux disease)    • Headache, tension-type    • Herniated disc, cervical    • HL (hearing loss)    • Hyperlipidemia     • Hypertension    • IBS (irritable bowel syndrome)    • Left knee pain    • Lumbar herniated disc    • Memory loss    • Migraine headache    • Mitral valve prolapse    • Movement disorder    • Narcolepsy    • Obesity    • KO (obstructive sleep apnea)     non compliant with CPAP    • Peripheral vascular disease (CMS/HCC)    • Prediabetes    • Restless leg     supressed with narcotics    • Sleep apnea    • Snoring    • SOB (shortness of breath)    • Umbilical hernia    • UTI (urinary tract infection)    • Varicose veins    • Venous insufficiency    • Wears glasses         Past Surgical History:   Procedure Laterality Date   • APPENDECTOMY     • BACK SURGERY     • BACK SURGERY     • CEREBRAL ANGIOGRAM     • FRACTURE SURGERY      LEFT ANKLE   • KNEE ARTHROSCOPY Left 6/27/2018    Procedure: diagnostic arthroscopy partial medial meniscectomy or other procedures as necessary, left knee;  Surgeon: Tj Hoffman MD;  Location: [facility];  Service: Orthopedics   • LAMINECTOMY     • SINUS SURGERY     • TONSILLECTOMY     • ULNAR NERVE TRANSPOSITION     • VASCULAR SURGERY     • VASECTOMY     • WISDOM TOOTH EXTRACTION         Family History   Problem Relation Age of Onset   • Cancer Other    • Diabetes Other    • Heart disease Other    • Hypertension Other    • Endocrine tumor Mother    • Anxiety disorder Mother    • Arthritis Mother    • Hypertension Mother    • Seizures Mother         Due to brain surgery   • Heart attack Father    • Hypertension Father    • Glaucoma Father    • Cancer Maternal Grandmother    • Heart attack Paternal Grandfather    • Hypertension Brother    • Neuropathy Brother    • Hypertension Sister    • Migraines Sister        Social History     Socioeconomic History   • Marital status:      Spouse name: Not on file   • Number of children: Not on file   • Years of education: Not on file   • Highest education level: Not on file   Tobacco Use   • Smoking status: Never Smoker   • Smokeless tobacco:  Never Used   • Tobacco comment: Never used.   Substance and Sexual Activity   • Alcohol use: Yes     Types: 2 Shots of liquor per week     Comment: per week   • Drug use: No   • Sexual activity: No       I have reviewed the above medical and surgical history, family history, social history, medications, allergies and review of systems.    Allergies   Allergen Reactions   • Calcium Channel Blockers Swelling     Lower extremity edema       • Adhesive Tape Rash   • Chlorhexidine Rash   • Nickel Swelling   • Zinc Oxide Rash and Unknown (See Comments)     SENSITIVITY ,CAUSES RASH           Current Outpatient Medications:   •  butalbital-acetaminophen-caffeine (FIORICET) -40 MG capsule capsule, Take 1 capsule by mouth as needed., Disp: , Rfl:   •  chlorthalidone (HYGROTON) 25 MG tablet, Take 25 mg by mouth., Disp: , Rfl:   •  clonazePAM (KLONOPIN) 0.5 MG tablet, Take 1 tablet by mouth 2 (Two) Times a Day As Needed for Anxiety., Disp: 60 tablet, Rfl: 0  •  cyclobenzaprine (FLEXERIL) 10 MG tablet, Take 1 tablet by mouth 3 (Three) Times a Day As Needed for Muscle Spasms., Disp: 90 tablet, Rfl: 3  •  diclofenac (VOLTAREN) 1 % gel gel, Apply 4 g topically to the appropriate area as directed 2 (Two) Times a Day., Disp: 1 tube, Rfl: 3  •  gabapentin (NEURONTIN) 800 MG tablet, Take 1 tablet by mouth 3 (Three) Times a Day., Disp: 90 tablet, Rfl: 0  •  latanoprost (XALATAN) 0.005 % ophthalmic solution, 1 drop Every Night., Disp: , Rfl:   •  lisinopril (PRINIVIL,ZESTRIL) 20 MG tablet, Take 20 mg by mouth Daily., Disp: , Rfl:   •  omeprazole (priLOSEC) 40 MG capsule, TAKE 1 CAPSULE EVERY DAY, Disp: 90 capsule, Rfl: 0  •  oxyCODONE-acetaminophen (PERCOCET) 5-325 MG per tablet, TK 1 T BID PRN, Disp: , Rfl: 0  •  pravastatin (PRAVACHOL) 40 MG tablet, TAKE 1 TABLET EVERY EVENING, Disp: 90 tablet, Rfl: 0  •  spironolactone (ALDACTONE) 25 MG tablet, TAKE 1 TABLET EVERY DAY, Disp: 90 tablet, Rfl: 1  •  SUMAtriptan (IMITREX) 50 MG  "tablet, Take 50 mg by mouth., Disp: , Rfl:     Objective   Resp 18   Ht 180.3 cm (70.98\")   Wt 113 kg (250 lb)   BMI 34.89 kg/m²    Physical Exam  Constitutional: Patient is oriented to person, place, and time. Patient appears well-developed and well-nourished.   HENT:Head: Normocephalic and atraumatic.   Eyes: EOM are normal. Pupils are equal, round, and reactive to light.   Neck: Normal range of motion. Neck supple.   Cardiovascular: Normal rate.    Pulmonary/Chest: Effort normal and breath sounds normal.   Abdominal: Soft.   Neurological: Patient is alert and oriented to person, place, and time.   Skin: Skin is warm and dry.   Psychiatric: Patient has a normal mood and affect.   Nursing note and vitals reviewed.       [unfilled]   Right knee: Trace effusion varus alignment loss of extension 5 degrees flexion 100 degrees calf supple mid calf circumference 16 inches.    Left knee: Trace effusion minimal medial joint line pain, mid calf circumference 17 inches with mildly positive Homans sign, multiple varicosities, no erythema no pain with straight leg raising or internal/external rotation left hip.    Assessment/Plan   Review of Radiographic Studies:    No new imaging done today.  Patient sent for duplex ultrasound left calf to rule out DVT      Procedures     Gonzalo was seen today for follow-up and pain.    Diagnoses and all orders for this visit:    Arthritis of knee  -     US Venous Doppler Lower Extremity Left (duplex); Future       Orthopedic activities reviewed and patient expressed appreciation and Using illustrations and models, the nature of the pathology was explained to the patient      Recommendations/Plan:   Work/Activity Status: May perform usual activities as tolerated    Patient agreeable to call or return sooner for any concerns.         I reviewed the patient's radiographs indicating advanced osteoarthritis discussed the natural history treatment options and pros and cons and risks and " complications of surgical and nonsurgical care.  I also reviewed advantages and disadvantages risks and complications of steroid injections versus viscus gel injections.  The patient had opportunity to ask questions which were answered.    Impression:  History of venous occlusive disorder left inguinal region chronically with new onset swelling left leg rule out DVT, history of bilateral knee osteoarthritis  Plan:  Duplex ultrasound left lower leg to rule out DVT if positive he will contact his PCP for treatment, if negative he will continue with Tylenol topical Lidoderm and consider viscous gel series

## 2019-10-23 RX ORDER — GABAPENTIN 800 MG/1
800 TABLET ORAL 3 TIMES DAILY
Qty: 90 TABLET | Refills: 0 | Status: SHIPPED | OUTPATIENT
Start: 2019-10-23

## 2019-10-24 ENCOUNTER — TRANSCRIBE ORDERS (OUTPATIENT)
Dept: LAB | Facility: HOSPITAL | Age: 64
End: 2019-10-24

## 2019-10-24 ENCOUNTER — LAB (OUTPATIENT)
Dept: LAB | Facility: HOSPITAL | Age: 64
End: 2019-10-24

## 2019-10-24 DIAGNOSIS — I10 ESSENTIAL HYPERTENSION, MALIGNANT: ICD-10-CM

## 2019-10-24 DIAGNOSIS — I10 ESSENTIAL HYPERTENSION, MALIGNANT: Primary | ICD-10-CM

## 2019-10-24 LAB
ANION GAP SERPL CALCULATED.3IONS-SCNC: 10 MMOL/L (ref 5–15)
BUN BLD-MCNC: 28 MG/DL (ref 8–23)
BUN/CREAT SERPL: 24.3 (ref 7–25)
CALCIUM SPEC-SCNC: 9 MG/DL (ref 8.6–10.5)
CHLORIDE SERPL-SCNC: 104 MMOL/L (ref 98–107)
CO2 SERPL-SCNC: 25 MMOL/L (ref 22–29)
CREAT BLD-MCNC: 1.15 MG/DL (ref 0.76–1.27)
GFR SERPL CREATININE-BSD FRML MDRD: 64 ML/MIN/1.73
GLUCOSE BLD-MCNC: 144 MG/DL (ref 65–99)
POTASSIUM BLD-SCNC: 3.9 MMOL/L (ref 3.5–5.2)
SODIUM BLD-SCNC: 139 MMOL/L (ref 136–145)

## 2019-10-24 PROCEDURE — 36415 COLL VENOUS BLD VENIPUNCTURE: CPT

## 2019-10-24 PROCEDURE — 80048 BASIC METABOLIC PNL TOTAL CA: CPT

## 2019-10-24 NOTE — PROGRESS NOTES
Spoke to patient he says he can't get in with his vascular dr until 11/19 and even though the US shows no blood clot he is convinced he has one because last time he did and it showed in his CT he had done.

## 2019-10-25 ENCOUNTER — TRANSCRIBE ORDERS (OUTPATIENT)
Dept: CT IMAGING | Facility: HOSPITAL | Age: 64
End: 2019-10-25

## 2019-10-25 DIAGNOSIS — E34.0 CARCINOID SYNDROME (HCC): Primary | ICD-10-CM

## 2019-10-28 ENCOUNTER — HOSPITAL ENCOUNTER (OUTPATIENT)
Dept: CT IMAGING | Facility: HOSPITAL | Age: 64
Discharge: HOME | End: 2019-10-28
Admitting: FAMILY MEDICINE

## 2019-10-28 DIAGNOSIS — E34.0 CARCINOID SYNDROME (HCC): ICD-10-CM

## 2019-10-28 PROCEDURE — 25010000002 IOPAMIDOL 61 % SOLUTION: Performed by: FAMILY MEDICINE

## 2019-10-28 PROCEDURE — 72191 CT ANGIOGRAPH PELV W/O&W/DYE: CPT

## 2019-10-28 RX ADMIN — IOPAMIDOL 100 ML: 612 INJECTION, SOLUTION INTRAVENOUS at 08:01

## 2020-02-24 ENCOUNTER — LAB (OUTPATIENT)
Dept: LAB | Facility: HOSPITAL | Age: 65
End: 2020-02-24

## 2020-02-24 ENCOUNTER — TRANSCRIBE ORDERS (OUTPATIENT)
Dept: LAB | Facility: HOSPITAL | Age: 65
End: 2020-02-24

## 2020-02-24 DIAGNOSIS — D50.0 IRON DEFICIENCY ANEMIA SECONDARY TO BLOOD LOSS (CHRONIC): ICD-10-CM

## 2020-02-24 DIAGNOSIS — I10 ESSENTIAL HYPERTENSION, MALIGNANT: ICD-10-CM

## 2020-02-24 DIAGNOSIS — E55.9 AVITAMINOSIS D: Primary | ICD-10-CM

## 2020-02-24 DIAGNOSIS — E55.9 AVITAMINOSIS D: ICD-10-CM

## 2020-02-24 PROCEDURE — 83540 ASSAY OF IRON: CPT

## 2020-02-24 PROCEDURE — 83735 ASSAY OF MAGNESIUM: CPT

## 2020-02-24 PROCEDURE — 85027 COMPLETE CBC AUTOMATED: CPT | Performed by: FAMILY MEDICINE

## 2020-02-24 PROCEDURE — 80053 COMPREHEN METABOLIC PANEL: CPT

## 2020-02-24 PROCEDURE — 84466 ASSAY OF TRANSFERRIN: CPT

## 2020-02-24 PROCEDURE — 82728 ASSAY OF FERRITIN: CPT

## 2020-02-24 PROCEDURE — 82306 VITAMIN D 25 HYDROXY: CPT

## 2020-02-24 PROCEDURE — 36415 COLL VENOUS BLD VENIPUNCTURE: CPT

## 2020-02-25 LAB
25(OH)D3 SERPL-MCNC: 31.7 NG/ML (ref 30–100)
ALBUMIN SERPL-MCNC: 4.3 G/DL (ref 3.5–5.2)
ALBUMIN/GLOB SERPL: 1.8 G/DL
ALP SERPL-CCNC: 77 U/L (ref 39–117)
ALT SERPL W P-5'-P-CCNC: 35 U/L (ref 1–41)
ANION GAP SERPL CALCULATED.3IONS-SCNC: 11.5 MMOL/L (ref 5–15)
AST SERPL-CCNC: 28 U/L (ref 1–40)
BILIRUB SERPL-MCNC: 0.3 MG/DL (ref 0.2–1.2)
BUN BLD-MCNC: 24 MG/DL (ref 8–23)
BUN/CREAT SERPL: 24 (ref 7–25)
CALCIUM SPEC-SCNC: 9.7 MG/DL (ref 8.6–10.5)
CHLORIDE SERPL-SCNC: 102 MMOL/L (ref 98–107)
CO2 SERPL-SCNC: 28.5 MMOL/L (ref 22–29)
CREAT BLD-MCNC: 1 MG/DL (ref 0.76–1.27)
DEPRECATED RDW RBC AUTO: 40.9 FL (ref 37–54)
ERYTHROCYTE [DISTWIDTH] IN BLOOD BY AUTOMATED COUNT: 13 % (ref 12.3–15.4)
FERRITIN SERPL-MCNC: 98.3 NG/ML (ref 30–400)
GFR SERPL CREATININE-BSD FRML MDRD: 75 ML/MIN/1.73
GLOBULIN UR ELPH-MCNC: 2.4 GM/DL
GLUCOSE BLD-MCNC: 88 MG/DL (ref 65–99)
HCT VFR BLD AUTO: 42.3 % (ref 37.5–51)
HGB BLD-MCNC: 13.7 G/DL (ref 13–17.7)
IRON 24H UR-MRATE: 108 MCG/DL (ref 59–158)
IRON SATN MFR SERPL: 23 % (ref 20–50)
MAGNESIUM SERPL-MCNC: 2.2 MG/DL (ref 1.6–2.4)
MCH RBC QN AUTO: 28.2 PG (ref 26.6–33)
MCHC RBC AUTO-ENTMCNC: 32.4 G/DL (ref 31.5–35.7)
MCV RBC AUTO: 87 FL (ref 79–97)
PLATELET # BLD AUTO: 177 10*3/MM3 (ref 140–450)
PMV BLD AUTO: 10.8 FL (ref 6–12)
POTASSIUM BLD-SCNC: 4.2 MMOL/L (ref 3.5–5.2)
PROT SERPL-MCNC: 6.7 G/DL (ref 6–8.5)
RBC # BLD AUTO: 4.86 10*6/MM3 (ref 4.14–5.8)
SODIUM BLD-SCNC: 142 MMOL/L (ref 136–145)
TIBC SERPL-MCNC: 469 MCG/DL (ref 298–536)
TRANSFERRIN SERPL-MCNC: 315 MG/DL (ref 200–360)
WBC NRBC COR # BLD: 6.06 10*3/MM3 (ref 3.4–10.8)

## 2020-06-18 ENCOUNTER — OFFICE VISIT (OUTPATIENT)
Dept: ORTHOPEDIC SURGERY | Facility: CLINIC | Age: 65
End: 2020-06-18

## 2020-06-18 VITALS — WEIGHT: 250 LBS | RESPIRATION RATE: 18 BRPM | HEIGHT: 71 IN | BODY MASS INDEX: 35 KG/M2

## 2020-06-18 DIAGNOSIS — M17.10 ARTHRITIS OF KNEE: Primary | ICD-10-CM

## 2020-06-18 PROCEDURE — 20610 DRAIN/INJ JOINT/BURSA W/O US: CPT | Performed by: ORTHOPAEDIC SURGERY

## 2020-06-18 RX ORDER — LIDOCAINE HYDROCHLORIDE 10 MG/ML
2 INJECTION, SOLUTION INFILTRATION; PERINEURAL
Status: COMPLETED | OUTPATIENT
Start: 2020-06-18 | End: 2020-06-18

## 2020-06-18 RX ORDER — TRIAMCINOLONE ACETONIDE 40 MG/ML
40 INJECTION, SUSPENSION INTRA-ARTICULAR; INTRAMUSCULAR
Status: COMPLETED | OUTPATIENT
Start: 2020-06-18 | End: 2020-06-18

## 2020-06-18 RX ADMIN — TRIAMCINOLONE ACETONIDE 40 MG: 40 INJECTION, SUSPENSION INTRA-ARTICULAR; INTRAMUSCULAR at 11:07

## 2020-06-18 RX ADMIN — LIDOCAINE HYDROCHLORIDE 2 ML: 10 INJECTION, SOLUTION INFILTRATION; PERINEURAL at 11:07

## 2020-06-18 NOTE — PROGRESS NOTES
Subjective   Gonzalo Oates is a 65 y.o. male here today for injection therapy.    Chief Complaint   Patient presents with   • Left Knee - Follow-up, Pain     Patient is here today for a left supartz injection (#1) and a right cortisone injection.   • Right Knee - Follow-up, Pain       Past Medical History:   Diagnosis Date   • Ankle fracture     left 02/2015   • Anxiety    • Arthritis    • Asthma    • BPH (benign prostatic hyperplasia)    • Cataract    • Chronic back pain    • Chronic sinusitis    • Colon polyps    • Constipation     OPIOD INDUCED   • CTS (carpal tunnel syndrome)    • Deep vein thrombosis (DVT) of iliac vein (CMS/HCC)    • Depression    • Diabetes mellitus (CMS/HCC)     PRE DIABETES NO MEDS   • Difficulty walking    • Erectile dysfunction    • GERD (gastroesophageal reflux disease)    • Headache, tension-type    • Herniated disc, cervical    • HL (hearing loss)    • Hyperlipidemia    • Hypertension    • IBS (irritable bowel syndrome)    • Left knee pain    • Lumbar herniated disc    • Memory loss    • Migraine headache    • Mitral valve prolapse    • Movement disorder    • Narcolepsy    • Obesity    • KO (obstructive sleep apnea)     non compliant with CPAP    • Peripheral vascular disease (CMS/HCC)    • Prediabetes    • Restless leg     supressed with narcotics    • Sleep apnea    • Snoring    • SOB (shortness of breath)    • Umbilical hernia    • UTI (urinary tract infection)    • Varicose veins    • Venous insufficiency    • Wears glasses          Past Surgical History:   Procedure Laterality Date   • APPENDECTOMY     • BACK SURGERY     • BACK SURGERY     • CEREBRAL ANGIOGRAM     • FRACTURE SURGERY      LEFT ANKLE   • KNEE ARTHROSCOPY Left 6/27/2018    Procedure: diagnostic arthroscopy partial medial meniscectomy or other procedures as necessary, left knee;  Surgeon: Tj Hoffman MD;  Location: [facility];  Service: Orthopedics   • LAMINECTOMY     • SINUS SURGERY     • TONSILLECTOMY     •  "ULNAR NERVE TRANSPOSITION     • VASCULAR SURGERY     • VASECTOMY     • WISDOM TOOTH EXTRACTION         Allergies   Allergen Reactions   • Adhesive Tape Rash and Itching   • Calcium Channel Blockers Swelling     Lower extremity edema       • Nickel Swelling and Itching   • Chlorhexidine Rash   • Zinc Oxide Rash and Unknown (See Comments)     SENSITIVITY ,CAUSES RASH         Objective   Resp 18   Ht 180.1 cm (70.9\")   Wt 113 kg (250 lb)   BMI 34.97 kg/m²    Physical Exam    Skin exam stable with no erythema, ecchymosis or rash.  No new swelling.  No motor or sensory changes.  Distal pulse intact.    Large Joint Arthrocentesis: L knee  Date/Time: 6/18/2020 11:06 AM  Consent given by: patient  Site marked: site marked  Timeout: Immediately prior to procedure a time out was called to verify the correct patient, procedure, equipment, support staff and site/side marked as required   Supporting Documentation  Indications: pain   Procedure Details  Location: knee - L knee  Preparation: Patient was prepped and draped in the usual sterile fashion  Needle size: 22 G  Approach: anterolateral  Medications administered: 25 mg sodium hyaluronate 25 MG/2.5ML  Patient tolerance: patient tolerated the procedure well with no immediate complications    Large Joint Arthrocentesis: R knee  Date/Time: 6/18/2020 11:07 AM  Consent given by: patient  Site marked: site marked  Timeout: Immediately prior to procedure a time out was called to verify the correct patient, procedure, equipment, support staff and site/side marked as required   Supporting Documentation  Indications: pain   Procedure Details  Location: knee - R knee  Preparation: Patient was prepped and draped in the usual sterile fashion  Needle size: 22 G  Approach: anterolateral  Medications administered: 40 mg triamcinolone acetonide 40 MG/ML; 2 mL lidocaine 1 %  Patient tolerance: patient tolerated the procedure well with no immediate complications          Assessment/Plan "      Diagnosis Plan   1. Arthritis of knee  Large Joint Arthrocentesis: L knee    Large Joint Arthrocentesis: R knee       Discussion of orthopaedic goals and activities and patient and/or guardian expressed appreciation.  Guided on proper techniques for mobility, strength, agility and/or conditioning exercises  Ice, heat, and/or modalities as beneficial  Watch for signs and symptoms of infection  Call or notify for any adverse effect from injection therapy    Recommendations:  Usual activities, routine exercise as tolerated, light physical work as tolerated, no strenuous activity.    No follow-ups on file.  Patient agreeable to call or return sooner for any concerns.

## 2020-06-25 ENCOUNTER — OFFICE VISIT (OUTPATIENT)
Dept: ORTHOPEDIC SURGERY | Facility: CLINIC | Age: 65
End: 2020-06-25

## 2020-06-25 VITALS — HEIGHT: 71 IN | BODY MASS INDEX: 35 KG/M2 | RESPIRATION RATE: 18 BRPM | WEIGHT: 250 LBS

## 2020-06-25 DIAGNOSIS — S83.232D COMPLEX TEAR OF MEDIAL MENISCUS OF LEFT KNEE AS CURRENT INJURY, SUBSEQUENT ENCOUNTER: ICD-10-CM

## 2020-06-25 DIAGNOSIS — M17.10 ARTHRITIS OF KNEE: Primary | ICD-10-CM

## 2020-06-25 PROCEDURE — 20610 DRAIN/INJ JOINT/BURSA W/O US: CPT | Performed by: ORTHOPAEDIC SURGERY

## 2020-06-25 RX ORDER — ESCITALOPRAM OXALATE 10 MG/1
20 TABLET ORAL
COMMUNITY
Start: 2020-06-18 | End: 2022-06-27

## 2020-06-25 NOTE — PROGRESS NOTES
Subjective   Gonzalo Oates is a 65 y.o. male here today for injection therapy.    Chief Complaint   Patient presents with   • Left Knee - Follow-up, Pain     Patient is here today for her 2nd supartz injection       Past Medical History:   Diagnosis Date   • Ankle fracture     left 02/2015   • Anxiety    • Arthritis    • Asthma    • BPH (benign prostatic hyperplasia)    • Cataract    • Chronic back pain    • Chronic sinusitis    • Colon polyps    • Constipation     OPIOD INDUCED   • CTS (carpal tunnel syndrome)    • Deep vein thrombosis (DVT) of iliac vein (CMS/HCC)    • Depression    • Diabetes mellitus (CMS/HCC)     PRE DIABETES NO MEDS   • Difficulty walking    • Erectile dysfunction    • GERD (gastroesophageal reflux disease)    • Headache, tension-type    • Herniated disc, cervical    • HL (hearing loss)    • Hyperlipidemia    • Hypertension    • IBS (irritable bowel syndrome)    • Left knee pain    • Lumbar herniated disc    • Memory loss    • Migraine headache    • Mitral valve prolapse    • Movement disorder    • Narcolepsy    • Obesity    • KO (obstructive sleep apnea)     non compliant with CPAP    • Peripheral vascular disease (CMS/HCC)    • Prediabetes    • Restless leg     supressed with narcotics    • Sleep apnea    • Snoring    • SOB (shortness of breath)    • Umbilical hernia    • UTI (urinary tract infection)    • Varicose veins    • Venous insufficiency    • Wears glasses          Past Surgical History:   Procedure Laterality Date   • APPENDECTOMY     • BACK SURGERY     • BACK SURGERY     • CEREBRAL ANGIOGRAM     • FRACTURE SURGERY      LEFT ANKLE   • KNEE ARTHROSCOPY Left 6/27/2018    Procedure: diagnostic arthroscopy partial medial meniscectomy or other procedures as necessary, left knee;  Surgeon: jT Hoffman MD;  Location: [facility];  Service: Orthopedics   • LAMINECTOMY     • SINUS SURGERY     • TONSILLECTOMY     • ULNAR NERVE TRANSPOSITION     • VASCULAR SURGERY     • VASECTOMY     •  "WISDOM TOOTH EXTRACTION         Allergies   Allergen Reactions   • Adhesive Tape Rash and Itching   • Calcium Channel Blockers Swelling     Lower extremity edema       • Nickel Swelling and Itching   • Chlorhexidine Rash   • Zinc Oxide Rash and Unknown (See Comments)     SENSITIVITY ,CAUSES RASH         Objective   Resp 18   Ht 180.1 cm (70.9\")   Wt 113 kg (250 lb)   BMI 34.97 kg/m²    Physical Exam    Skin exam stable with no erythema, ecchymosis or rash.  No new swelling.  No motor or sensory changes.  Distal pulse intact.    Large Joint Arthrocentesis: L knee  Date/Time: 6/25/2020 1:31 PM  Consent given by: patient  Site marked: site marked  Timeout: Immediately prior to procedure a time out was called to verify the correct patient, procedure, equipment, support staff and site/side marked as required   Supporting Documentation  Indications: pain   Procedure Details  Location: knee - L knee  Preparation: Patient was prepped and draped in the usual sterile fashion  Needle size: 22 G  Approach: anterolateral  Medications administered: 25 mg sodium hyaluronate 25 MG/2.5ML  Patient tolerance: patient tolerated the procedure well with no immediate complications          Assessment/Plan      Diagnosis Plan   1. Arthritis of knee  XR Knee 1 or 2 View Left   2. Complex tear of medial meniscus of left knee as current injury, subsequent encounter  XR Knee 1 or 2 View Left       Discussion of orthopaedic goals and activities and patient and/or guardian expressed appreciation.  Guided on proper techniques for mobility, strength, agility and/or conditioning exercises  Ice, heat, and/or modalities as beneficial  Watch for signs and symptoms of infection  Call or notify for any adverse effect from injection therapy    Recommendations:  Usual activities, routine exercise as tolerated, light physical work as tolerated, no strenuous activity.    No follow-ups on file.  Patient agreeable to call or return sooner for any " concerns.

## 2020-07-02 ENCOUNTER — OFFICE VISIT (OUTPATIENT)
Dept: ORTHOPEDIC SURGERY | Facility: CLINIC | Age: 65
End: 2020-07-02

## 2020-07-02 VITALS — RESPIRATION RATE: 18 BRPM | WEIGHT: 250 LBS | BODY MASS INDEX: 35 KG/M2 | HEIGHT: 71 IN

## 2020-07-02 DIAGNOSIS — M17.10 ARTHRITIS OF KNEE: Primary | ICD-10-CM

## 2020-07-02 PROCEDURE — 20610 DRAIN/INJ JOINT/BURSA W/O US: CPT | Performed by: ORTHOPAEDIC SURGERY

## 2020-07-02 RX ORDER — DIAZEPAM 10 MG/1
10 TABLET ORAL 2 TIMES DAILY PRN
COMMUNITY
End: 2022-06-27

## 2020-07-02 NOTE — PROGRESS NOTES
Subjective   Gonzalo Oates is a 65 y.o. male here today for injection therapy.    Chief Complaint   Patient presents with   • Left Knee - Follow-up, Pain     Patient is here today for his 3rd supartz injection.       Past Medical History:   Diagnosis Date   • Ankle fracture     left 02/2015   • Anxiety    • Arthritis    • Asthma    • BPH (benign prostatic hyperplasia)    • Cataract    • Chronic back pain    • Chronic sinusitis    • Colon polyps    • Constipation     OPIOD INDUCED   • CTS (carpal tunnel syndrome)    • Deep vein thrombosis (DVT) of iliac vein (CMS/HCC)    • Depression    • Diabetes mellitus (CMS/HCC)     PRE DIABETES NO MEDS   • Difficulty walking    • Erectile dysfunction    • GERD (gastroesophageal reflux disease)    • Headache, tension-type    • Herniated disc, cervical    • HL (hearing loss)    • Hyperlipidemia    • Hypertension    • IBS (irritable bowel syndrome)    • Left knee pain    • Lumbar herniated disc    • Memory loss    • Migraine headache    • Mitral valve prolapse    • Movement disorder    • Narcolepsy    • Obesity    • KO (obstructive sleep apnea)     non compliant with CPAP    • Peripheral vascular disease (CMS/HCC)    • Prediabetes    • Restless leg     supressed with narcotics    • Sleep apnea    • Snoring    • SOB (shortness of breath)    • Umbilical hernia    • UTI (urinary tract infection)    • Varicose veins    • Venous insufficiency    • Wears glasses          Past Surgical History:   Procedure Laterality Date   • APPENDECTOMY     • BACK SURGERY     • BACK SURGERY     • CEREBRAL ANGIOGRAM     • FRACTURE SURGERY      LEFT ANKLE   • KNEE ARTHROSCOPY Left 6/27/2018    Procedure: diagnostic arthroscopy partial medial meniscectomy or other procedures as necessary, left knee;  Surgeon: Tj Hoffman MD;  Location: [facility];  Service: Orthopedics   • LAMINECTOMY     • SINUS SURGERY     • TONSILLECTOMY     • ULNAR NERVE TRANSPOSITION     • VASCULAR SURGERY     • VASECTOMY    "  • WISDOM TOOTH EXTRACTION         Allergies   Allergen Reactions   • Adhesive Tape Rash and Itching   • Calcium Channel Blockers Swelling     Lower extremity edema       • Nickel Swelling and Itching   • Chlorhexidine Rash   • Zinc Oxide Rash and Unknown (See Comments)     SENSITIVITY ,CAUSES RASH         Objective   Resp 18   Ht 180.1 cm (70.9\")   Wt 113 kg (250 lb)   BMI 34.97 kg/m²    Physical Exam    Skin exam stable with no erythema, ecchymosis or rash.  No new swelling.  No motor or sensory changes.  Distal pulse intact.    Large Joint Arthrocentesis: L knee  Date/Time: 7/2/2020 11:08 AM  Consent given by: patient  Site marked: site marked  Timeout: Immediately prior to procedure a time out was called to verify the correct patient, procedure, equipment, support staff and site/side marked as required   Supporting Documentation  Indications: pain   Procedure Details  Location: knee - L knee  Preparation: Patient was prepped and draped in the usual sterile fashion  Needle size: 22 G  Approach: anterolateral  Medications administered: 25 mg sodium hyaluronate 25 MG/2.5ML  Patient tolerance: patient tolerated the procedure well with no immediate complications          Assessment/Plan      Diagnosis Plan   1. Arthritis of knee  Large Joint Arthrocentesis: L knee       Discussion of orthopaedic goals and activities and patient and/or guardian expressed appreciation.  Guided on proper techniques for mobility, strength, agility and/or conditioning exercises  Ice, heat, and/or modalities as beneficial  Watch for signs and symptoms of infection  Call or notify for any adverse effect from injection therapy    Recommendations:  Usual activities, routine exercise as tolerated, light physical work as tolerated, no strenuous activity.    No follow-ups on file.  Patient agreeable to call or return sooner for any concerns.       "

## 2020-07-09 ENCOUNTER — OFFICE VISIT (OUTPATIENT)
Dept: ORTHOPEDIC SURGERY | Facility: CLINIC | Age: 65
End: 2020-07-09

## 2020-07-09 VITALS — HEIGHT: 71 IN | BODY MASS INDEX: 35 KG/M2 | WEIGHT: 250 LBS | RESPIRATION RATE: 18 BRPM

## 2020-07-09 DIAGNOSIS — M25.562 LEFT KNEE PAIN, UNSPECIFIED CHRONICITY: ICD-10-CM

## 2020-07-09 DIAGNOSIS — S83.232D COMPLEX TEAR OF MEDIAL MENISCUS OF LEFT KNEE AS CURRENT INJURY, SUBSEQUENT ENCOUNTER: Primary | ICD-10-CM

## 2020-07-09 DIAGNOSIS — M17.12 PRIMARY LOCALIZED OSTEOARTHRITIS OF LEFT KNEE: ICD-10-CM

## 2020-07-09 PROCEDURE — 20610 DRAIN/INJ JOINT/BURSA W/O US: CPT | Performed by: ORTHOPAEDIC SURGERY

## 2020-07-09 PROCEDURE — 99212 OFFICE O/P EST SF 10 MIN: CPT | Performed by: ORTHOPAEDIC SURGERY

## 2020-07-09 NOTE — PROGRESS NOTES
Subjective   Gonzalo Oates is a 65 y.o. male here today for injection therapy.    Patient is 6 weeks status post posterior cervical surgery has been in communication with his spine surgeon using topical antibiotic wanted me to look at the superiormost aspect of his incision to see if a suture was coming through the skin.    Chief Complaint   Patient presents with   • Left Knee - Injections     Supartz # 4       Past Medical History:   Diagnosis Date   • Ankle fracture     left 02/2015   • Anxiety    • Arthritis    • Asthma    • BPH (benign prostatic hyperplasia)    • Cataract    • Chronic back pain    • Chronic sinusitis    • Colon polyps    • Constipation     OPIOD INDUCED   • CTS (carpal tunnel syndrome)    • Deep vein thrombosis (DVT) of iliac vein (CMS/HCC)    • Depression    • Diabetes mellitus (CMS/HCC)     PRE DIABETES NO MEDS   • Difficulty walking    • Erectile dysfunction    • GERD (gastroesophageal reflux disease)    • Headache, tension-type    • Herniated disc, cervical    • HL (hearing loss)    • Hyperlipidemia    • Hypertension    • IBS (irritable bowel syndrome)    • Left knee pain    • Lumbar herniated disc    • Memory loss    • Migraine headache    • Mitral valve prolapse    • Movement disorder    • Narcolepsy    • Obesity    • KO (obstructive sleep apnea)     non compliant with CPAP    • Peripheral vascular disease (CMS/HCC)    • Prediabetes    • Restless leg     supressed with narcotics    • Sleep apnea    • Snoring    • SOB (shortness of breath)    • Umbilical hernia    • UTI (urinary tract infection)    • Varicose veins    • Venous insufficiency    • Wears glasses         Past Surgical History:   Procedure Laterality Date   • APPENDECTOMY     • BACK SURGERY     • BACK SURGERY     • CEREBRAL ANGIOGRAM     • FRACTURE SURGERY      LEFT ANKLE   • KNEE ARTHROSCOPY Left 6/27/2018    Procedure: diagnostic arthroscopy partial medial meniscectomy or other procedures as necessary, left knee;   "Surgeon: Tj Hoffman MD;  Location: [facility];  Service: Orthopedics   • LAMINECTOMY     • SINUS SURGERY     • TONSILLECTOMY     • ULNAR NERVE TRANSPOSITION     • VASCULAR SURGERY     • VASECTOMY     • WISDOM TOOTH EXTRACTION         Allergies   Allergen Reactions   • Adhesive Tape Rash and Itching   • Calcium Channel Blockers Swelling     Lower extremity edema       • Nickel Swelling and Itching   • Chlorhexidine Rash   • Zinc Oxide Rash and Unknown (See Comments)     SENSITIVITY ,CAUSES RASH         Objective   Resp 18   Ht 180.1 cm (70.9\")   Wt 113 kg (250 lb)   BMI 34.97 kg/m²    Physical Exam    Skin exam stable with no erythema, ecchymosis or rash.  No new swelling.  No motor or sensory changes.  Distal pulse intact.      Cervical spine: Midline incision appears to be well approximated minimal erythema there are to several areas of small Vicryl appearing suture protruding through the skin without associated purulence or erythema.  These 2 small areas were sterilely prepped and removed using a forceps and sterile scissors.  Large Joint Arthrocentesis: L knee  Date/Time: 7/9/2020 10:50 AM  Consent given by: patient  Site marked: site marked  Timeout: Immediately prior to procedure a time out was called to verify the correct patient, procedure, equipment, support staff and site/side marked as required   Supporting Documentation  Indications: pain   Procedure Details  Location: knee - L knee  Preparation: Patient was prepped and draped in the usual sterile fashion  Needle gauge: 21g.  Medications administered: 25 mg sodium hyaluronate 25 MG/2.5ML  Patient tolerance: patient tolerated the procedure well with no immediate complications          Assessment/Plan      Diagnosis Plan   1. Complex tear of medial meniscus of left knee as current injury, subsequent encounter  XR Knee 1 or 2 View Left   2. Left knee pain, unspecified chronicity  XR Knee 1 or 2 View Left       No complications of injection " noted.    Discussion of orthopaedic goals and activities and patient and/or guardian expressed appreciation. Call or notify for any adverse effect from injection therapy. Ice, heat, and/or modalities as beneficial. Watch for signs and symptoms of infection.    Recommendations:  Work/Activity Status: May perform usual activities as tolerated. May return to routine exercise and physical work as tolerated. No strenuous activity.  Patient and/or guardian is encouraged to call or return for any issues or concerns.    No follow-ups on file.  Patient agreeable to call or return sooner for any concerns.    He will follow-up with the cervical spine surgeon in [facility] regarding further care of his surgical incision

## 2020-07-21 ENCOUNTER — OFFICE VISIT (OUTPATIENT)
Dept: ORTHOPEDIC SURGERY | Facility: CLINIC | Age: 65
End: 2020-07-21

## 2020-07-21 VITALS — RESPIRATION RATE: 18 BRPM | WEIGHT: 250 LBS | BODY MASS INDEX: 35 KG/M2 | HEIGHT: 71 IN

## 2020-07-21 DIAGNOSIS — M17.10 ARTHRITIS OF KNEE: Primary | ICD-10-CM

## 2020-07-21 PROCEDURE — 20610 DRAIN/INJ JOINT/BURSA W/O US: CPT | Performed by: ORTHOPAEDIC SURGERY

## 2020-07-21 NOTE — PROGRESS NOTES
Subjective   Gonzalo Oates is a 65 y.o. male here today for injection therapy.    Chief Complaint   Patient presents with   • Left Knee - Follow-up, Pain     Patient is here today for his 5th supartz.       Past Medical History:   Diagnosis Date   • Ankle fracture     left 02/2015   • Anxiety    • Arthritis    • Asthma    • BPH (benign prostatic hyperplasia)    • Cataract    • Chronic back pain    • Chronic sinusitis    • Colon polyps    • Constipation     OPIOD INDUCED   • CTS (carpal tunnel syndrome)    • Deep vein thrombosis (DVT) of iliac vein (CMS/HCC)    • Depression    • Diabetes mellitus (CMS/HCC)     PRE DIABETES NO MEDS   • Difficulty walking    • Erectile dysfunction    • GERD (gastroesophageal reflux disease)    • Headache, tension-type    • Herniated disc, cervical    • HL (hearing loss)    • Hyperlipidemia    • Hypertension    • IBS (irritable bowel syndrome)    • Left knee pain    • Lumbar herniated disc    • Memory loss    • Migraine headache    • Mitral valve prolapse    • Movement disorder    • Narcolepsy    • Obesity    • KO (obstructive sleep apnea)     non compliant with CPAP    • Peripheral vascular disease (CMS/HCC)    • Prediabetes    • Restless leg     supressed with narcotics    • Sleep apnea    • Snoring    • SOB (shortness of breath)    • Umbilical hernia    • UTI (urinary tract infection)    • Varicose veins    • Venous insufficiency    • Wears glasses          Past Surgical History:   Procedure Laterality Date   • APPENDECTOMY     • BACK SURGERY     • BACK SURGERY     • CEREBRAL ANGIOGRAM     • FRACTURE SURGERY      LEFT ANKLE   • KNEE ARTHROSCOPY Left 6/27/2018    Procedure: diagnostic arthroscopy partial medial meniscectomy or other procedures as necessary, left knee;  Surgeon: Tj Hoffman MD;  Location: [facility];  Service: Orthopedics   • LAMINECTOMY     • SINUS SURGERY     • TONSILLECTOMY     • ULNAR NERVE TRANSPOSITION     • VASCULAR SURGERY     • VASECTOMY     • WISDOM  "TOOTH EXTRACTION         Allergies   Allergen Reactions   • Adhesive Tape Rash and Itching   • Calcium Channel Blockers Swelling     Lower extremity edema       • Nickel Swelling and Itching   • Chlorhexidine Rash   • Zinc Oxide Rash and Unknown (See Comments)     SENSITIVITY ,CAUSES RASH         Objective   Resp 18   Ht 180.1 cm (70.9\")   Wt 113 kg (250 lb)   BMI 34.97 kg/m²    Physical Exam    Skin exam stable with no erythema, ecchymosis or rash.  No new swelling.  No motor or sensory changes.  Distal pulse intact.    Large Joint Arthrocentesis: L knee  Date/Time: 7/21/2020 1:08 PM  Consent given by: patient  Site marked: site marked  Timeout: Immediately prior to procedure a time out was called to verify the correct patient, procedure, equipment, support staff and site/side marked as required   Supporting Documentation  Indications: pain   Procedure Details  Location: knee - L knee  Preparation: Patient was prepped and draped in the usual sterile fashion  Needle size: 22 G  Approach: anterolateral  Medications administered: 25 mg sodium hyaluronate 25 MG/2.5ML  Patient tolerance: patient tolerated the procedure well with no immediate complications          Assessment/Plan     No diagnosis found.    Discussion of orthopaedic goals and activities and patient and/or guardian expressed appreciation.  Guided on proper techniques for mobility, strength, agility and/or conditioning exercises  Ice, heat, and/or modalities as beneficial  Watch for signs and symptoms of infection  Call or notify for any adverse effect from injection therapy    Recommendations:  Usual activities, routine exercise as tolerated, light physical work as tolerated, no strenuous activity.    No follow-ups on file.  Patient agreeable to call or return sooner for any concerns.       "

## 2021-01-11 ENCOUNTER — TRANSCRIBE ORDERS (OUTPATIENT)
Dept: LAB | Facility: HOSPITAL | Age: 66
End: 2021-01-11

## 2021-01-11 ENCOUNTER — LAB (OUTPATIENT)
Dept: LAB | Facility: HOSPITAL | Age: 66
End: 2021-01-11

## 2021-01-11 DIAGNOSIS — R94.6 NONSPECIFIC ABNORMAL RESULTS OF THYROID FUNCTION STUDY: ICD-10-CM

## 2021-01-11 DIAGNOSIS — D50.0 IRON DEFICIENCY ANEMIA SECONDARY TO BLOOD LOSS (CHRONIC): ICD-10-CM

## 2021-01-11 DIAGNOSIS — I10 ESSENTIAL HYPERTENSION, MALIGNANT: Primary | ICD-10-CM

## 2021-01-11 DIAGNOSIS — K21.00 GASTROESOPHAGEAL REFLUX DISEASE WITH ESOPHAGITIS, UNSPECIFIED WHETHER HEMORRHAGE: ICD-10-CM

## 2021-01-11 DIAGNOSIS — I10 ESSENTIAL HYPERTENSION, MALIGNANT: ICD-10-CM

## 2021-01-11 DIAGNOSIS — E55.9 AVITAMINOSIS D: ICD-10-CM

## 2021-01-11 PROCEDURE — 86677 HELICOBACTER PYLORI ANTIBODY: CPT

## 2021-01-11 PROCEDURE — 85027 COMPLETE CBC AUTOMATED: CPT

## 2021-01-11 PROCEDURE — 84443 ASSAY THYROID STIM HORMONE: CPT

## 2021-01-11 PROCEDURE — 84466 ASSAY OF TRANSFERRIN: CPT

## 2021-01-11 PROCEDURE — 84439 ASSAY OF FREE THYROXINE: CPT

## 2021-01-11 PROCEDURE — 83540 ASSAY OF IRON: CPT

## 2021-01-11 PROCEDURE — 80053 COMPREHEN METABOLIC PANEL: CPT

## 2021-01-11 PROCEDURE — 82607 VITAMIN B-12: CPT

## 2021-01-11 PROCEDURE — 82306 VITAMIN D 25 HYDROXY: CPT

## 2021-01-11 PROCEDURE — 36415 COLL VENOUS BLD VENIPUNCTURE: CPT

## 2021-01-12 LAB
25(OH)D3 SERPL-MCNC: 41.5 NG/ML (ref 30–100)
ALBUMIN SERPL-MCNC: 4.5 G/DL (ref 3.5–5.2)
ALBUMIN/GLOB SERPL: 2.1 G/DL
ALP SERPL-CCNC: 82 U/L (ref 39–117)
ALT SERPL W P-5'-P-CCNC: 46 U/L (ref 1–41)
ANION GAP SERPL CALCULATED.3IONS-SCNC: 8.5 MMOL/L (ref 5–15)
AST SERPL-CCNC: 34 U/L (ref 1–40)
BILIRUB SERPL-MCNC: 0.5 MG/DL (ref 0–1.2)
BUN SERPL-MCNC: 19 MG/DL (ref 8–23)
BUN/CREAT SERPL: 20.4 (ref 7–25)
CALCIUM SPEC-SCNC: 9.2 MG/DL (ref 8.6–10.5)
CHLORIDE SERPL-SCNC: 104 MMOL/L (ref 98–107)
CO2 SERPL-SCNC: 28.5 MMOL/L (ref 22–29)
CREAT SERPL-MCNC: 0.93 MG/DL (ref 0.76–1.27)
DEPRECATED RDW RBC AUTO: 39.2 FL (ref 37–54)
ERYTHROCYTE [DISTWIDTH] IN BLOOD BY AUTOMATED COUNT: 12.5 % (ref 12.3–15.4)
GFR SERPL CREATININE-BSD FRML MDRD: 82 ML/MIN/1.73
GLOBULIN UR ELPH-MCNC: 2.1 GM/DL
GLUCOSE SERPL-MCNC: 103 MG/DL (ref 65–99)
H PYLORI IGA SER-ACNC: <9 UNITS (ref 0–8.9)
H PYLORI IGG SER IA-ACNC: 0.2 INDEX VALUE (ref 0–0.79)
H PYLORI IGM SER-ACNC: <9 UNITS (ref 0–8.9)
HCT VFR BLD AUTO: 44.4 % (ref 37.5–51)
HGB BLD-MCNC: 14.7 G/DL (ref 13–17.7)
IRON 24H UR-MRATE: 92 MCG/DL (ref 59–158)
IRON SATN MFR SERPL: 18 % (ref 20–50)
MCH RBC QN AUTO: 28.8 PG (ref 26.6–33)
MCHC RBC AUTO-ENTMCNC: 33.1 G/DL (ref 31.5–35.7)
MCV RBC AUTO: 86.9 FL (ref 79–97)
PLATELET # BLD AUTO: 183 10*3/MM3 (ref 140–450)
PMV BLD AUTO: 10.6 FL (ref 6–12)
POTASSIUM SERPL-SCNC: 4.3 MMOL/L (ref 3.5–5.2)
PROT SERPL-MCNC: 6.6 G/DL (ref 6–8.5)
RBC # BLD AUTO: 5.11 10*6/MM3 (ref 4.14–5.8)
SODIUM SERPL-SCNC: 141 MMOL/L (ref 136–145)
T4 FREE SERPL-MCNC: 1.04 NG/DL (ref 0.93–1.7)
TIBC SERPL-MCNC: 501 MCG/DL (ref 298–536)
TRANSFERRIN SERPL-MCNC: 336 MG/DL (ref 200–360)
TSH SERPL DL<=0.05 MIU/L-ACNC: 1.94 UIU/ML (ref 0.27–4.2)
VIT B12 BLD-MCNC: 469 PG/ML (ref 211–946)
WBC # BLD AUTO: 5.24 10*3/MM3 (ref 3.4–10.8)

## 2021-06-29 ENCOUNTER — TRANSCRIBE ORDERS (OUTPATIENT)
Dept: LAB | Facility: HOSPITAL | Age: 66
End: 2021-06-29

## 2021-06-29 ENCOUNTER — LAB (OUTPATIENT)
Dept: LAB | Facility: HOSPITAL | Age: 66
End: 2021-06-29

## 2021-06-29 DIAGNOSIS — R73.9 BLOOD GLUCOSE ELEVATED: ICD-10-CM

## 2021-06-29 DIAGNOSIS — E78.2 MIXED HYPERLIPIDEMIA: ICD-10-CM

## 2021-06-29 DIAGNOSIS — I10 ESSENTIAL HYPERTENSION, MALIGNANT: ICD-10-CM

## 2021-06-29 DIAGNOSIS — I10 ESSENTIAL HYPERTENSION, MALIGNANT: Primary | ICD-10-CM

## 2021-06-29 LAB
ALBUMIN SERPL-MCNC: 4.5 G/DL (ref 3.5–5.2)
ALBUMIN/GLOB SERPL: 2.3 G/DL
ALP SERPL-CCNC: 80 U/L (ref 39–117)
ALT SERPL W P-5'-P-CCNC: 42 U/L (ref 1–41)
ANION GAP SERPL CALCULATED.3IONS-SCNC: 7.7 MMOL/L (ref 5–15)
AST SERPL-CCNC: 30 U/L (ref 1–40)
BILIRUB SERPL-MCNC: 0.6 MG/DL (ref 0–1.2)
BUN SERPL-MCNC: 23 MG/DL (ref 8–23)
BUN/CREAT SERPL: 18.3 (ref 7–25)
CALCIUM SPEC-SCNC: 9.2 MG/DL (ref 8.6–10.5)
CHLORIDE SERPL-SCNC: 105 MMOL/L (ref 98–107)
CHOLEST SERPL-MCNC: 157 MG/DL (ref 0–200)
CO2 SERPL-SCNC: 28.3 MMOL/L (ref 22–29)
CREAT SERPL-MCNC: 1.26 MG/DL (ref 0.76–1.27)
GFR SERPL CREATININE-BSD FRML MDRD: 57 ML/MIN/1.73
GLOBULIN UR ELPH-MCNC: 2 GM/DL
GLUCOSE SERPL-MCNC: 115 MG/DL (ref 65–99)
HBA1C MFR BLD: 6.52 % (ref 4.8–5.6)
HDLC SERPL-MCNC: 40 MG/DL (ref 40–60)
LDLC SERPL CALC-MCNC: 102 MG/DL (ref 0–100)
LDLC/HDLC SERPL: 2.53 {RATIO}
POTASSIUM SERPL-SCNC: 4 MMOL/L (ref 3.5–5.2)
PROT SERPL-MCNC: 6.5 G/DL (ref 6–8.5)
SODIUM SERPL-SCNC: 141 MMOL/L (ref 136–145)
TRIGL SERPL-MCNC: 79 MG/DL (ref 0–150)
VLDLC SERPL-MCNC: 15 MG/DL (ref 5–40)

## 2021-06-29 PROCEDURE — 83036 HEMOGLOBIN GLYCOSYLATED A1C: CPT

## 2021-06-29 PROCEDURE — 36415 COLL VENOUS BLD VENIPUNCTURE: CPT

## 2021-06-29 PROCEDURE — 80053 COMPREHEN METABOLIC PANEL: CPT

## 2021-06-29 PROCEDURE — 80061 LIPID PANEL: CPT

## 2021-08-05 ENCOUNTER — HOSPITAL ENCOUNTER (OUTPATIENT)
Dept: GENERAL RADIOLOGY | Facility: HOSPITAL | Age: 66
Discharge: HOME | End: 2021-08-05

## 2021-08-05 ENCOUNTER — HOSPITAL ENCOUNTER (OUTPATIENT)
Dept: ULTRASOUND IMAGING | Facility: HOSPITAL | Age: 66
Discharge: HOME | End: 2021-08-05

## 2021-08-05 ENCOUNTER — TRANSCRIBE ORDERS (OUTPATIENT)
Dept: ADMINISTRATIVE | Facility: HOSPITAL | Age: 66
End: 2021-08-05

## 2021-08-05 DIAGNOSIS — R10.84 ABDOMINAL PAIN, GENERALIZED: ICD-10-CM

## 2021-08-05 DIAGNOSIS — M79.662 PAIN OF LEFT LOWER LEG: ICD-10-CM

## 2021-08-05 DIAGNOSIS — M79.662 PAIN OF LEFT LOWER LEG: Primary | ICD-10-CM

## 2021-08-05 PROCEDURE — 93971 EXTREMITY STUDY: CPT

## 2021-08-05 PROCEDURE — 74022 RADEX COMPL AQT ABD SERIES: CPT

## 2021-10-11 ENCOUNTER — HOSPITAL ENCOUNTER (EMERGENCY)
Facility: HOSPITAL | Age: 66
Discharge: HOME | End: 2021-10-12
Attending: EMERGENCY MEDICINE | Admitting: EMERGENCY MEDICINE

## 2021-10-11 DIAGNOSIS — S20.229A CONTUSION OF BACK, UNSPECIFIED LATERALITY, INITIAL ENCOUNTER: Primary | ICD-10-CM

## 2021-10-11 PROCEDURE — 99283 EMERGENCY DEPT VISIT LOW MDM: CPT

## 2021-10-11 PROCEDURE — 96372 THER/PROPH/DIAG INJ SC/IM: CPT

## 2021-10-12 ENCOUNTER — APPOINTMENT (OUTPATIENT)
Dept: CT IMAGING | Facility: HOSPITAL | Age: 66
End: 2021-10-12

## 2021-10-12 VITALS
HEIGHT: 71 IN | RESPIRATION RATE: 16 BRPM | DIASTOLIC BLOOD PRESSURE: 55 MMHG | BODY MASS INDEX: 39.2 KG/M2 | TEMPERATURE: 98.1 F | HEART RATE: 53 BPM | SYSTOLIC BLOOD PRESSURE: 106 MMHG | OXYGEN SATURATION: 95 % | WEIGHT: 280 LBS

## 2021-10-12 PROCEDURE — 25010000002 ONDANSETRON PER 1 MG: Performed by: EMERGENCY MEDICINE

## 2021-10-12 PROCEDURE — 25010000002 HYDROMORPHONE PER 4 MG: Performed by: EMERGENCY MEDICINE

## 2021-10-12 PROCEDURE — 96372 THER/PROPH/DIAG INJ SC/IM: CPT

## 2021-10-12 PROCEDURE — 72131 CT LUMBAR SPINE W/O DYE: CPT

## 2021-10-12 RX ORDER — METHYLPREDNISOLONE 4 MG/1
TABLET ORAL
Qty: 1 EACH | Refills: 0 | Status: SHIPPED | OUTPATIENT
Start: 2021-10-12 | End: 2021-10-12

## 2021-10-12 RX ORDER — ONDANSETRON 2 MG/ML
4 INJECTION INTRAMUSCULAR; INTRAVENOUS ONCE
Status: COMPLETED | OUTPATIENT
Start: 2021-10-12 | End: 2021-10-12

## 2021-10-12 RX ORDER — HYDROMORPHONE HCL 110MG/55ML
2 PATIENT CONTROLLED ANALGESIA SYRINGE INTRAVENOUS ONCE
Status: COMPLETED | OUTPATIENT
Start: 2021-10-12 | End: 2021-10-12

## 2021-10-12 RX ORDER — METHYLPREDNISOLONE 4 MG/1
TABLET ORAL
Qty: 1 EACH | Refills: 0 | Status: SHIPPED | OUTPATIENT
Start: 2021-10-12 | End: 2022-06-27

## 2021-10-12 RX ADMIN — HYDROMORPHONE HYDROCHLORIDE 2 MG: 2 INJECTION, SOLUTION INTRAMUSCULAR; INTRAVENOUS; SUBCUTANEOUS at 01:37

## 2021-10-12 RX ADMIN — ONDANSETRON 4 MG: 2 INJECTION INTRAMUSCULAR; INTRAVENOUS at 01:37

## 2021-10-12 NOTE — ED PROVIDER NOTES
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  He straddled the floor joist with his groin and also hit his back on the wall at the same time.  Initially was somewhat sore but thought he would get better.  He has a history of back surgery in the past.  Since that time he has continued to have significant pain in his lower back that is not clearing as he thought it would.  He had a little soreness in his left leg where he hit it but that is also better and he is able to ambulate.  He says his groin is not really all that tender is just in the low back.      History provided by:  Patient   used: No    Fall  Mechanism of injury: fall    Injury location:  Torso  Torso injury location:  Back  Incident location:  Home  Time since incident:  4 days  Arrived directly from scene: no    Fall:     Fall occurred:  Standing    Impact surface:  Hard floor    Point of impact:  Unable to specify    Entrapped after fall: no    Protective equipment: none    Suspicion of alcohol use: no    Suspicion of drug use: no    Tetanus status:  Unknown  Prior to arrival data:     Bystander interventions:  None    Patient ambulatory at scene: yes      Blood loss:  None    Responsiveness at scene:  Alert    Orientation at scene:  Person, place, situation and time    Loss of consciousness: no      Amnesic to event: no      Airway interventions:  None    Breathing interventions:  None    IV access status:  None    IO access:  None    Fluids administered:  None    Cardiac interventions:  None    Medications administered:  None    Immobilization:  None    Airway condition since incident:  Stable    Breathing condition since incident:  Stable    Circulation condition since incident:  Stable    Mental status condition since incident:  Stable    Disability condition since incident:   Stable  Associated symptoms: back pain    Associated symptoms: no difficulty breathing, no headaches, no loss of consciousness and no neck pain    Risk factors: no AICD, no beta blocker therapy, no CABG, no diabetes, no dialysis, no hemophilia, no pacemaker and not pregnant        Review of Systems   Constitutional: Negative.    HENT: Negative.    Respiratory: Negative.    Cardiovascular: Negative.    Gastrointestinal: Negative.    Genitourinary: Negative.    Musculoskeletal: Positive for back pain. Negative for neck pain.   Skin: Negative.    Neurological: Negative.  Negative for loss of consciousness and headaches.   Psychiatric/Behavioral: Negative.    All other systems reviewed and are negative.      Past Medical History:   Diagnosis Date   • Ankle fracture     left 02/2015   • Anxiety    • Arthritis    • Asthma    • BPH (benign prostatic hyperplasia)    • Cataract    • Chronic back pain    • Chronic sinusitis    • Colon polyps    • Constipation     OPIOD INDUCED   • CTS (carpal tunnel syndrome)    • Deep vein thrombosis (DVT) of iliac vein (CMS/[facility])    • Depression    • Diabetes mellitus (CMS/[facility])     PRE DIABETES NO MEDS   • Difficulty walking    • Erectile dysfunction    • GERD (gastroesophageal reflux disease)    • Headache, tension-type    • Herniated disc, cervical    • HL (hearing loss)    • Hyperlipidemia    • Hypertension    • IBS (irritable bowel syndrome)    • Left knee pain    • Lumbar herniated disc    • Memory loss    • Migraine headache    • Mitral valve prolapse    • Movement disorder    • Narcolepsy    • Obesity    • KO (obstructive sleep apnea)     non compliant with CPAP    • Peripheral vascular disease (CMS/[facility])    • Prediabetes    • Restless leg     supressed with narcotics    • Sleep apnea    • Snoring    • SOB (shortness of breath)    • Umbilical hernia    • UTI (urinary tract infection)    • Varicose veins    • Venous insufficiency    • Wears glasses        Allergies   Allergen Reactions    • Adhesive Tape Rash and Itching   • Calcium Channel Blockers Swelling     Lower extremity edema       • Nickel Swelling and Itching   • Chlorhexidine Rash   • Zinc Oxide Rash and Unknown (See Comments)     SENSITIVITY ,CAUSES RASH         Past Surgical History:   Procedure Laterality Date   • APPENDECTOMY     • BACK SURGERY     • BACK SURGERY     • CEREBRAL ANGIOGRAM     • FRACTURE SURGERY      LEFT ANKLE   • KNEE ARTHROSCOPY Left 6/27/2018    Procedure: diagnostic arthroscopy partial medial meniscectomy or other procedures as necessary, left knee;  Surgeon: Tj Hoffman MD;  Location: [facility];  Service: Orthopedics   • LAMINECTOMY     • SINUS SURGERY     • TONSILLECTOMY     • ULNAR NERVE TRANSPOSITION     • VASCULAR SURGERY     • VASECTOMY     • WISDOM TOOTH EXTRACTION         Family History   Problem Relation Age of Onset   • Cancer Other    • Diabetes Other    • Heart disease Other    • Hypertension Other    • Endocrine tumor Mother    • Anxiety disorder Mother    • Arthritis Mother    • Hypertension Mother    • Seizures Mother         Due to brain surgery   • Heart attack Father    • Hypertension Father    • Glaucoma Father    • Cancer Maternal Grandmother    • Heart attack Paternal Grandfather    • Hypertension Brother    • Neuropathy Brother    • Hypertension Sister    • Migraines Sister        Social History     Socioeconomic History   • Marital status:    Tobacco Use   • Smoking status: Never Smoker   • Smokeless tobacco: Never Used   • Tobacco comment: Never used.   Substance and Sexual Activity   • Alcohol use: Yes     Types: 2 Shots of liquor per week     Comment: per week   • Drug use: No   • Sexual activity: Never       Prior to Admission medications    Medication Sig Start Date End Date Taking? Authorizing Provider   butalbital-acetaminophen-caffeine (FIORICET) -40 MG capsule capsule Take 1 capsule by mouth as needed. 10/22/13   Provider, MD Kobe   chlorthalidone (HYGROTON)  25 MG tablet Take 25 mg by mouth.    Kobe Orona MD   clonazePAM (KLONOPIN) 0.5 MG tablet Take 1 tablet by mouth 2 (Two) Times a Day As Needed for Anxiety. 2/12/19   Divine Perez APRN   cyclobenzaprine (FLEXERIL) 10 MG tablet Take 1 tablet by mouth 3 (Three) Times a Day As Needed for Muscle Spasms. 2/5/19   Divine Perez APRN   diazePAM (VALIUM) 10 MG tablet Take 10 mg by mouth 2 (Two) Times a Day As Needed for Anxiety.    Kobe Orona MD   escitalopram (LEXAPRO) 10 MG tablet 20 mg. 6/18/20   Kobe Orona MD   gabapentin (NEURONTIN) 800 MG tablet Take 1 tablet by mouth 3 (Three) Times a Day. 10/23/19   Valentine Oates APRN   latanoprost (XALATAN) 0.005 % ophthalmic solution 1 drop Every Night.    Kobe Orona MD   lisinopril (PRINIVIL,ZESTRIL) 20 MG tablet Take 20 mg by mouth Daily.    Kobe Orona MD   omeprazole (priLOSEC) 40 MG capsule TAKE 1 CAPSULE EVERY DAY 10/8/18   Divine Perez APRN   oxyCODONE-acetaminophen (PERCOCET)  MG per tablet 10 tablets. 8/3/19   Kobe Orona MD   pravastatin (PRAVACHOL) 40 MG tablet TAKE 1 TABLET EVERY EVENING 4/29/19   Ross Ellsworth MD   spironolactone (ALDACTONE) 25 MG tablet TAKE 1 TABLET EVERY DAY 12/18/18   Divine Perez APRN   SUMAtriptan (IMITREX) 50 MG tablet Take 50 mg by mouth.    Emergency, Nurse Epic, RN       Medications   HYDROmorphone (DILAUDID) injection 2 mg (has no administration in time range)   ondansetron (ZOFRAN) injection 4 mg (has no administration in time range)       Vitals:    10/12/21 0016   BP: 109/78   Pulse: 75   Resp: 16   Temp:    SpO2: 99%         Objective   Physical Exam  Vitals and nursing note reviewed.   Constitutional:       Appearance: Normal appearance.   HENT:      Head: Normocephalic.   Eyes:      Extraocular Movements: Extraocular movements intact.      Pupils: Pupils are equal, round, and reactive to light.   Musculoskeletal:         General:  Tenderness present.      Cervical back: Normal range of motion and neck supple.      Comments: Patient has a small bruise on the internal aspect of the left lower thigh area just above the knee.  He also has some tenderness in the left anterior shin area.  There is no deformity in these areas.  He is tender to palpation and any movement in the lower back but there is no obvious deformity noted.   Skin:     General: Skin is warm and dry.   Neurological:      General: No focal deficit present.      Mental Status: He is alert.   Psychiatric:         Mood and Affect: Mood normal.         Behavior: Behavior normal.         Procedures         Lab Results (last 24 hours)     ** No results found for the last 24 hours. **          CT Lumbar Spine Without Contrast    (Results Pending)       ED Course  ED Course as of 10/12/21 0104   e Oct 12, 2021   0103 I told the patient his CT scan of his back does not reveal any signs of acute fractures.  He has a lot of chronic changes and postsurgical changes but nothing acute.  This is apparently just a bad bruising of his back and will take some time to get better but it should be okay.  We will try him on a round of steroids to see if it would help.  We will get him a shot for the pain tonight.  He is discharged in stable condition. [TR]      ED Course User Index  [TR] Willis Pichardo Jr., MD          MDM  Number of Diagnoses or Management Options  Contusion of back, unspecified laterality, initial encounter: new and requires workup     Amount and/or Complexity of Data Reviewed  Tests in the radiology section of CPT®: ordered and reviewed    Risk of Complications, Morbidity, and/or Mortality  Presenting problems: moderate  Diagnostic procedures: moderate  Management options: moderate    Patient Progress  Patient progress: stable      Final diagnoses:   Contusion of back, unspecified laterality, initial encounter          Willis Pichardo Jr., MD  10/12/21 0104

## 2021-10-12 NOTE — ED NOTES
Pt presents CC of a fall on Thursday  10/7/21. Pt states he fell through a temporary board in the floor and landed straddling the beam. Pt denies hitting his head or LOC. The pt c/o lumbar back pain and left leg. The pt states he has taken flexeril and aleve for pain.   Shelbie Moser, OBINNA  10/11/21 2154       Shelbie Moser, RN  10/11/21 2156       Ehsan, Shelbie, RN  10/11/21 2157

## 2021-10-23 ENCOUNTER — APPOINTMENT (OUTPATIENT)
Dept: GENERAL RADIOLOGY | Facility: HOSPITAL | Age: 66
End: 2021-10-23

## 2021-10-23 ENCOUNTER — HOSPITAL ENCOUNTER (EMERGENCY)
Facility: HOSPITAL | Age: 66
Discharge: HOME | End: 2021-10-23
Attending: EMERGENCY MEDICINE | Admitting: EMERGENCY MEDICINE

## 2021-10-23 ENCOUNTER — APPOINTMENT (OUTPATIENT)
Dept: CT IMAGING | Facility: HOSPITAL | Age: 66
End: 2021-10-23

## 2021-10-23 VITALS
TEMPERATURE: 98.3 F | SYSTOLIC BLOOD PRESSURE: 135 MMHG | BODY MASS INDEX: 38.5 KG/M2 | WEIGHT: 275 LBS | HEART RATE: 69 BPM | OXYGEN SATURATION: 97 % | HEIGHT: 71 IN | RESPIRATION RATE: 18 BRPM | DIASTOLIC BLOOD PRESSURE: 72 MMHG
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
## 2021-10-23 DIAGNOSIS — S02.2XXA CLOSED FRACTURE OF NASAL BONE, INITIAL ENCOUNTER: Primary | ICD-10-CM

## 2021-10-23 PROCEDURE — 96372 THER/PROPH/DIAG INJ SC/IM: CPT

## 2021-10-23 PROCEDURE — 70450 CT HEAD/BRAIN W/O DYE: CPT

## 2021-10-23 PROCEDURE — 73110 X-RAY EXAM OF WRIST: CPT

## 2021-10-23 PROCEDURE — 25010000003 MORPHINE SULFATE (PF) 4 MG/ML SOLUTION: Performed by: EMERGENCY MEDICINE

## 2021-10-23 PROCEDURE — 70486 CT MAXILLOFACIAL W/O DYE: CPT

## 2021-10-23 PROCEDURE — 72125 CT NECK SPINE W/O DYE: CPT

## 2021-10-23 PROCEDURE — 25010000002 KETOROLAC TROMETHAMINE PER 15 MG: Performed by: EMERGENCY MEDICINE

## 2021-10-23 PROCEDURE — 99283 EMERGENCY DEPT VISIT LOW MDM: CPT

## 2021-10-23 RX ORDER — KETOROLAC TROMETHAMINE 30 MG/ML
30 INJECTION, SOLUTION INTRAMUSCULAR; INTRAVENOUS ONCE
Status: COMPLETED | OUTPATIENT
Start: 2021-10-23 | End: 2021-10-23

## 2021-10-23 RX ORDER — ASPIRIN 81 MG/1
81 TABLET, CHEWABLE ORAL DAILY
COMMUNITY
End: 2022-06-27

## 2021-10-23 RX ORDER — CLONAZEPAM 0.5 MG/1
1 TABLET ORAL ONCE
Status: COMPLETED | OUTPATIENT
Start: 2021-10-23 | End: 2021-10-23

## 2021-10-23 RX ORDER — HYDROCODONE BITARTRATE AND ACETAMINOPHEN 5; 325 MG/1; MG/1
1 TABLET ORAL EVERY 6 HOURS PRN
Qty: 10 TABLET | Refills: 0 | Status: SHIPPED | OUTPATIENT
Start: 2021-10-23 | End: 2022-06-27

## 2021-10-23 RX ORDER — OXYMETAZOLINE HYDROCHLORIDE 0.05 G/100ML
2 SPRAY NASAL 2 TIMES DAILY
Qty: 30 ML | Refills: 0 | Status: SHIPPED | OUTPATIENT
Start: 2021-10-23 | End: 2022-06-27

## 2021-10-23 RX ORDER — MORPHINE SULFATE 4 MG/ML
4 INJECTION, SOLUTION INTRAMUSCULAR; INTRAVENOUS ONCE
Status: COMPLETED | OUTPATIENT
Start: 2021-10-23 | End: 2021-10-23

## 2021-10-23 RX ORDER — PRAMIPEXOLE DIHYDROCHLORIDE 1 MG/1
1 TABLET ORAL 2 TIMES DAILY
COMMUNITY

## 2021-10-23 RX ORDER — NAPROXEN 500 MG/1
500 TABLET ORAL 2 TIMES DAILY WITH MEALS
COMMUNITY

## 2021-10-23 RX ADMIN — CLONAZEPAM 1 MG: 0.5 TABLET ORAL at 06:38

## 2021-10-23 RX ADMIN — MORPHINE SULFATE 4 MG: 4 INJECTION, SOLUTION INTRAMUSCULAR; INTRAVENOUS at 04:18

## 2021-10-23 RX ADMIN — KETOROLAC TROMETHAMINE 30 MG: 30 INJECTION, SOLUTION INTRAMUSCULAR; INTRAVENOUS at 04:19

## 2021-10-23 NOTE — ED PROVIDER NOTES
TRIAGE CHIEF COMPLAINT:     Nursing and triage notes reviewed    Chief Complaint   Patient presents with   • Fall      HPI: Gonzalo Oates is a 66 y.o. male who presents to the emergency department complaining of facial pain following a fall.  Patient states he was on the toilet and he fell asleep and fell forward and landed on his nose.  He states there was a large amount of blood that came from his nose.  He has some pain in his mouth.  He also complains of mild pain in his bilateral wrists.  Patient states he is certain he did not pass out and that he actually fell asleep.  He complains of some mild pain in his neck.  He denies any chest pain or shortness of breath.  He denies dizziness or lightheadedness.  No nausea or vomiting.    REVIEW OF SYSTEMS: All other systems reviewed and are negative     PAST MEDICAL HISTORY:   Past Medical History:   Diagnosis Date   • Ankle fracture     left 02/2015   • Anxiety    • Arthritis    • Asthma    • BPH (benign prostatic hyperplasia)    • Cataract    • Chronic back pain    • Chronic sinusitis    • Colon polyps    • Constipation     OPIOD INDUCED   • CTS (carpal tunnel syndrome)    • Deep vein thrombosis (DVT) of iliac vein (HCC)    • Depression    • Diabetes mellitus (HCC)     PRE DIABETES NO MEDS   • Difficulty walking    • Erectile dysfunction    • GERD (gastroesophageal reflux disease)    • Headache, tension-type    • Herniated disc, cervical    • HL (hearing loss)    • Hyperlipidemia    • Hypertension    • IBS (irritable bowel syndrome)    • Left knee pain    • Lumbar herniated disc    • Memory loss    • Migraine headache    • Mitral valve prolapse    • Movement disorder    • Narcolepsy    • Obesity    • KO (obstructive sleep apnea)     non compliant with CPAP    • Peripheral vascular disease (HCC)    • Prediabetes    • Restless leg     supressed with narcotics    • Sleep apnea    • Snoring    • SOB (shortness of breath)    • Umbilical hernia    • UTI (urinary tract  infection)    • Varicose veins    • Venous insufficiency    • Wears glasses         FAMILY HISTORY:   Family History   Problem Relation Age of Onset   • Cancer Other    • Diabetes Other    • Heart disease Other    • Hypertension Other    • Endocrine tumor Mother    • Anxiety disorder Mother    • Arthritis Mother    • Hypertension Mother    • Seizures Mother         Due to brain surgery   • Heart attack Father    • Hypertension Father    • Glaucoma Father    • Cancer Maternal Grandmother    • Heart attack Paternal Grandfather    • Hypertension Brother    • Neuropathy Brother    • Hypertension Sister    • Migraines Sister         SOCIAL HISTORY:   Social History     Socioeconomic History   • Marital status:    Tobacco Use   • Smoking status: Never Smoker   • Smokeless tobacco: Never Used   • Tobacco comment: Never used.   Substance and Sexual Activity   • Alcohol use: Yes     Types: 2 Shots of liquor per week     Comment: per week   • Drug use: No   • Sexual activity: Never        SURGICAL HISTORY:   Past Surgical History:   Procedure Laterality Date   • APPENDECTOMY     • BACK SURGERY     • BACK SURGERY     • CEREBRAL ANGIOGRAM     • FRACTURE SURGERY      LEFT ANKLE   • KNEE ARTHROSCOPY Left 6/27/2018    Procedure: diagnostic arthroscopy partial medial meniscectomy or other procedures as necessary, left knee;  Surgeon: Tj Hoffman MD;  Location: [facility];  Service: Orthopedics   • LAMINECTOMY     • SINUS SURGERY     • TONSILLECTOMY     • ULNAR NERVE TRANSPOSITION     • VASCULAR SURGERY     • VASECTOMY     • WISDOM TOOTH EXTRACTION          CURRENT MEDICATIONS:      Medication List      ASK your doctor about these medications    aspirin 81 MG chewable tablet     chlorthalidone 25 MG tablet  Commonly known as: HYGROTON     clonazePAM 0.5 MG tablet  Commonly known as: KlonoPIN  Take 1 tablet by mouth 2 (Two) Times a Day As Needed for Anxiety.     cyclobenzaprine 10 MG tablet  Commonly known as:  FLEXERIL  Take 1 tablet by mouth 3 (Three) Times a Day As Needed for Muscle Spasms.     diazePAM 10 MG tablet  Commonly known as: VALIUM     escitalopram 10 MG tablet  Commonly known as: LEXAPRO     Fioricet -40 MG capsule capsule  Generic drug: butalbital-acetaminophen-caffeine     gabapentin 800 MG tablet  Commonly known as: NEURONTIN  Take 1 tablet by mouth 3 (Three) Times a Day.     latanoprost 0.005 % ophthalmic solution  Commonly known as: XALATAN     lisinopril 20 MG tablet  Commonly known as: PRINIVIL,ZESTRIL     methylPREDNISolone 4 MG dose pack  Commonly known as: MEDROL  Take as directed on package instructions.     naproxen 500 MG tablet  Commonly known as: NAPROSYN     omeprazole 40 MG capsule  Commonly known as: priLOSEC  TAKE 1 CAPSULE EVERY DAY     oxyCODONE-acetaminophen  MG per tablet  Commonly known as: PERCOCET     pramipexole 1 MG tablet  Commonly known as: MIRAPEX     pravastatin 40 MG tablet  Commonly known as: PRAVACHOL  TAKE 1 TABLET EVERY EVENING     sertraline 50 MG tablet  Commonly known as: ZOLOFT     spironolactone 25 MG tablet  Commonly known as: ALDACTONE  TAKE 1 TABLET EVERY DAY     SUMAtriptan 50 MG tablet  Commonly known as: IMITREX     vitamin D3 125 MCG (5000 UT) capsule capsule             ALLERGIES: Adhesive tape, Calcium channel blockers, Nickel, Chlorhexidine, and Zinc oxide     PHYSICAL EXAM:   VITAL SIGNS:   Vitals:    10/23/21 0335   BP:    Pulse:    Resp:    Temp: 98.3 °F (36.8 °C)   SpO2:       CONSTITUTIONAL: Awake, oriented, appears nontoxic   HENT: There is moist blood on the bilateral nares, there is a small punctate wound on the anterior bridge of the nose, there is dried blood here but no current active bleeding, oral mucosa pink and moist, no obvious trauma inside the mouth, airway patent. Nares patent without drainage. External ears normal.   EYES: Conjunctivae clear, EOMI, PERRL   NECK: Trachea midline, mild tenderness on the left side of patient's  cervical spine, no obvious step-off or deformity.  No tenderness along the thoracic or lumbar spine.  CARDIOVASCULAR: Normal heart rate, Normal rhythm, No murmurs, rubs, gallops   PULMONARY/CHEST: Clear to auscultation, no rhonchi, wheezes, or rales. Symmetrical breath sounds.  ABDOMINAL: Nondistended, soft, nontender - no rebound or guarding.  NEUROLOGIC: Nonfocal, moving all four extremities, no gross sensory or motor deficits.   EXTREMITIES: No clubbing, cyanosis, or edema.  Mild discomfort with palpation of the wrists bilaterally but no obvious bony deformities.  Radial and ulnar pulses are intact bilaterally.  SKIN: Warm, Dry, No erythema, No rash     ED COURSE / MEDICAL DECISION MAKING:   Gonzalo Oates is a 66 y.o. male who presents to the emergency department for evaluation of facial pain following a fall.  Patient is nondistressed on arrival in emergency department but does have blood in his bilateral nares and on the bridge of his nose.  There is no current active bleeding.  Patient did have tenderness and pain in his neck and so was placed in a cervical collar on arrival.    Will obtain imaging studies for further evaluation of his symptoms.    X-rays of the bilateral wrist per my interpretation do not reveal any obvious acute abnormality.    CT scan of the cervical spine and the head per radiology interpretation do not reveal any obvious acute findings.  CT scan of the facial bones does reveal a nondisplaced nasal bone fracture.    Patient's pain treated in the emergency department.  The small scratch on the nose will not require sutures as it is well approximated and superficial.    Will provide decongestions as well and advised patient not to blow nose until cleared to do so by the ear nose and throat physician.    DECISION TO DISCHARGE/ADMIT: see ED care timeline     FINAL IMPRESSION:   1 --nasal bone fracture  2 --fall  3 --     Electronically signed by: Shelly De La O MD, 10/23/2021 03:51  Shelly Yu MD  10/23/21 0649

## 2022-02-17 ENCOUNTER — HOSPITAL ENCOUNTER (OUTPATIENT)
Dept: GENERAL RADIOLOGY | Facility: HOSPITAL | Age: 67
Discharge: HOME | End: 2022-02-17
Admitting: FAMILY MEDICINE

## 2022-02-17 ENCOUNTER — TRANSCRIBE ORDERS (OUTPATIENT)
Dept: GENERAL RADIOLOGY | Facility: HOSPITAL | Age: 67
End: 2022-02-17

## 2022-02-17 DIAGNOSIS — M79.672 LEFT FOOT PAIN: ICD-10-CM

## 2022-02-17 DIAGNOSIS — M79.672 LEFT FOOT PAIN: Primary | ICD-10-CM

## 2022-02-17 PROCEDURE — 73620 X-RAY EXAM OF FOOT: CPT

## 2022-05-31 ENCOUNTER — TRANSCRIBE ORDERS (OUTPATIENT)
Dept: LAB | Facility: HOSPITAL | Age: 67
End: 2022-05-31

## 2022-05-31 ENCOUNTER — LAB (OUTPATIENT)
Dept: LAB | Facility: HOSPITAL | Age: 67
End: 2022-05-31

## 2022-05-31 DIAGNOSIS — E55.9 AVITAMINOSIS D: ICD-10-CM

## 2022-05-31 DIAGNOSIS — I10 ESSENTIAL HYPERTENSION, MALIGNANT: ICD-10-CM

## 2022-05-31 DIAGNOSIS — E11.9 DIABETES MELLITUS WITHOUT COMPLICATION: ICD-10-CM

## 2022-05-31 DIAGNOSIS — E78.2 MIXED HYPERLIPIDEMIA: ICD-10-CM

## 2022-05-31 DIAGNOSIS — I10 ESSENTIAL HYPERTENSION, MALIGNANT: Primary | ICD-10-CM

## 2022-05-31 DIAGNOSIS — Z12.5 SPECIAL SCREENING FOR MALIGNANT NEOPLASM OF PROSTATE: ICD-10-CM

## 2022-05-31 LAB
25(OH)D3 SERPL-MCNC: 42.1 NG/ML (ref 30–100)
ALBUMIN SERPL-MCNC: 4.3 G/DL (ref 3.5–5.2)
ALBUMIN/GLOB SERPL: 2.2 G/DL
ALP SERPL-CCNC: 76 U/L (ref 39–117)
ALT SERPL W P-5'-P-CCNC: 29 U/L (ref 1–41)
ANION GAP SERPL CALCULATED.3IONS-SCNC: 11 MMOL/L (ref 5–15)
AST SERPL-CCNC: 24 U/L (ref 1–40)
BILIRUB SERPL-MCNC: 0.3 MG/DL (ref 0–1.2)
BUN SERPL-MCNC: 22 MG/DL (ref 8–23)
BUN/CREAT SERPL: 22.7 (ref 7–25)
CALCIUM SPEC-SCNC: 9.5 MG/DL (ref 8.6–10.5)
CHLORIDE SERPL-SCNC: 104 MMOL/L (ref 98–107)
CHOLEST SERPL-MCNC: 153 MG/DL (ref 0–200)
CO2 SERPL-SCNC: 27 MMOL/L (ref 22–29)
CREAT SERPL-MCNC: 0.97 MG/DL (ref 0.76–1.27)
EGFRCR SERPLBLD CKD-EPI 2021: 85.6 ML/MIN/1.73
GLOBULIN UR ELPH-MCNC: 2 GM/DL
GLUCOSE SERPL-MCNC: 94 MG/DL (ref 65–99)
HBA1C MFR BLD: 5.6 % (ref 4.8–5.6)
HDLC SERPL-MCNC: 50 MG/DL (ref 40–60)
LDLC SERPL CALC-MCNC: 91 MG/DL (ref 0–100)
LDLC/HDLC SERPL: 1.83 {RATIO}
POTASSIUM SERPL-SCNC: 3.7 MMOL/L (ref 3.5–5.2)
PROT SERPL-MCNC: 6.3 G/DL (ref 6–8.5)
PSA SERPL-MCNC: 0.6 NG/ML (ref 0–4)
SODIUM SERPL-SCNC: 142 MMOL/L (ref 136–145)
TRIGL SERPL-MCNC: 58 MG/DL (ref 0–150)
VLDLC SERPL-MCNC: 12 MG/DL (ref 5–40)

## 2022-05-31 PROCEDURE — 80061 LIPID PANEL: CPT

## 2022-05-31 PROCEDURE — 80053 COMPREHEN METABOLIC PANEL: CPT

## 2022-05-31 PROCEDURE — 82306 VITAMIN D 25 HYDROXY: CPT

## 2022-05-31 PROCEDURE — 36415 COLL VENOUS BLD VENIPUNCTURE: CPT

## 2022-05-31 PROCEDURE — 83036 HEMOGLOBIN GLYCOSYLATED A1C: CPT

## 2022-05-31 PROCEDURE — G0103 PSA SCREENING: HCPCS

## 2022-06-24 DIAGNOSIS — M25.511 ARTHRALGIA OF RIGHT SHOULDER REGION: Primary | ICD-10-CM

## 2022-06-27 ENCOUNTER — OFFICE VISIT (OUTPATIENT)
Dept: ORTHOPEDIC SURGERY | Facility: CLINIC | Age: 67
End: 2022-06-27

## 2022-06-27 VITALS — WEIGHT: 228.2 LBS | BODY MASS INDEX: 31.95 KG/M2 | HEIGHT: 71 IN | TEMPERATURE: 98 F

## 2022-06-27 DIAGNOSIS — M17.12 PRIMARY OSTEOARTHRITIS OF LEFT KNEE: ICD-10-CM

## 2022-06-27 DIAGNOSIS — M25.562 ARTHRALGIA OF LEFT KNEE: Primary | ICD-10-CM

## 2022-06-27 DIAGNOSIS — M21.162 ACQUIRED VARUS DEFORMITY OF KNEE, LEFT: ICD-10-CM

## 2022-06-27 DIAGNOSIS — M25.511 ARTHRALGIA OF RIGHT SHOULDER REGION: ICD-10-CM

## 2022-06-27 DIAGNOSIS — M19.011 OSTEOARTHRITIS OF RIGHT AC (ACROMIOCLAVICULAR) JOINT: ICD-10-CM

## 2022-06-27 DIAGNOSIS — M75.41 IMPINGEMENT SYNDROME OF RIGHT SHOULDER: ICD-10-CM

## 2022-06-27 PROCEDURE — 73030 X-RAY EXAM OF SHOULDER: CPT | Performed by: ORTHOPAEDIC SURGERY

## 2022-06-27 PROCEDURE — 99214 OFFICE O/P EST MOD 30 MIN: CPT | Performed by: ORTHOPAEDIC SURGERY

## 2022-06-27 PROCEDURE — 20610 DRAIN/INJ JOINT/BURSA W/O US: CPT | Performed by: ORTHOPAEDIC SURGERY

## 2022-06-27 PROCEDURE — 73560 X-RAY EXAM OF KNEE 1 OR 2: CPT | Performed by: ORTHOPAEDIC SURGERY

## 2022-06-27 RX ORDER — METFORMIN HYDROCHLORIDE 750 MG/1
TABLET, EXTENDED RELEASE ORAL
COMMUNITY
Start: 2022-04-15

## 2022-06-27 RX ORDER — SERTRALINE HYDROCHLORIDE 100 MG/1
TABLET, FILM COATED ORAL
COMMUNITY
Start: 2022-06-01 | End: 2023-01-25

## 2022-06-27 RX ADMIN — METHYLPREDNISOLONE ACETATE 40 MG: 40 INJECTION, SUSPENSION INTRA-ARTICULAR; INTRALESIONAL; INTRAMUSCULAR; SOFT TISSUE at 16:14

## 2022-06-27 RX ADMIN — LIDOCAINE HYDROCHLORIDE 2 ML: 20 INJECTION, SOLUTION INFILTRATION; PERINEURAL at 16:14

## 2022-07-10 RX ORDER — LIDOCAINE HYDROCHLORIDE 20 MG/ML
2 INJECTION, SOLUTION INFILTRATION; PERINEURAL
Status: COMPLETED | OUTPATIENT
Start: 2022-06-27 | End: 2022-06-27

## 2022-07-10 RX ORDER — METHYLPREDNISOLONE ACETATE 40 MG/ML
40 INJECTION, SUSPENSION INTRA-ARTICULAR; INTRALESIONAL; INTRAMUSCULAR; SOFT TISSUE
Status: COMPLETED | OUTPATIENT
Start: 2022-06-27 | End: 2022-06-27

## 2022-08-15 ENCOUNTER — TELEPHONE (OUTPATIENT)
Dept: ORTHOPEDIC SURGERY | Facility: CLINIC | Age: 67
End: 2022-08-15

## 2022-08-15 NOTE — TELEPHONE ENCOUNTER
"----- Message from Gonzalo Oates sent at 8/12/2022 10:33 PM EDT -----  Regarding: Left knee injections by Dr. Hoffman  \"Patient reports that with treatments (at our clinic Dr. Hoffman provided a series of Supartz visco injections last year that were helpful) and since the last visit, overall left knee pain was decreased until just recently, motion and strength are maintained.\"    There is a misunderstanding.  The series of injections was not effective and my knee has continued to cause pain which has increased in the last year due to increased kneeling while remodeling my home.    I received more relief from the arthrocentesis injections.  "

## 2022-08-17 ENCOUNTER — TELEPHONE (OUTPATIENT)
Dept: ORTHOPEDIC SURGERY | Facility: CLINIC | Age: 67
End: 2022-08-17

## 2022-08-17 NOTE — TELEPHONE ENCOUNTER
Caller: Gonzalo Oates Gene    Relationship: Self    Best call back number: [phone]    What is the medical concern/diagnosis: CERVICAL DISCS    What specialty or service is being requested: ORTHO    What is the provider, practice or medical service name: [facility]     What is the office location: [place]    What is the office phone number: [phone]  (FANNIE)    FAX : [phone]    Any additional details: NEEDS A REFERRAL W/ OFFICE NOTES TO SEE PATIENT.

## 2022-09-30 ENCOUNTER — APPOINTMENT (OUTPATIENT)
Dept: GENERAL RADIOLOGY | Facility: HOSPITAL | Age: 67
End: 2022-09-30

## 2022-09-30 ENCOUNTER — HOSPITAL ENCOUNTER (EMERGENCY)
Facility: HOSPITAL | Age: 67
Discharge: HOME | End: 2022-09-30
Attending: EMERGENCY MEDICINE | Admitting: EMERGENCY MEDICINE

## 2022-09-30 ENCOUNTER — TELEMEDICINE (OUTPATIENT)
Dept: FAMILY MEDICINE CLINIC | Facility: TELEHEALTH | Age: 67
End: 2022-09-30

## 2022-09-30 VITALS
WEIGHT: 220 LBS | BODY MASS INDEX: 30.8 KG/M2 | SYSTOLIC BLOOD PRESSURE: 108 MMHG | HEART RATE: 52 BPM | RESPIRATION RATE: 18 BRPM | OXYGEN SATURATION: 97 % | HEIGHT: 71 IN | DIASTOLIC BLOOD PRESSURE: 70 MMHG | TEMPERATURE: 97.5 F

## 2022-09-30 DIAGNOSIS — K52.9 GASTROENTERITIS: ICD-10-CM

## 2022-09-30 DIAGNOSIS — R19.7 NAUSEA VOMITING AND DIARRHEA: ICD-10-CM

## 2022-09-30 DIAGNOSIS — R07.9 CHEST PAIN, UNSPECIFIED TYPE: Primary | ICD-10-CM

## 2022-09-30 DIAGNOSIS — R11.2 NAUSEA VOMITING AND DIARRHEA: ICD-10-CM

## 2022-09-30 LAB
ALBUMIN SERPL-MCNC: 4.2 G/DL (ref 3.5–5.2)
ALBUMIN/GLOB SERPL: 1.7 G/DL
ALP SERPL-CCNC: 79 U/L (ref 39–117)
ALT SERPL W P-5'-P-CCNC: 22 U/L (ref 1–41)
ANION GAP SERPL CALCULATED.3IONS-SCNC: 9 MMOL/L (ref 5–15)
AST SERPL-CCNC: 18 U/L (ref 1–40)
BASOPHILS # BLD AUTO: 0.02 10*3/MM3 (ref 0–0.2)
BASOPHILS NFR BLD AUTO: 0.2 % (ref 0–1.5)
BILIRUB SERPL-MCNC: 0.4 MG/DL (ref 0–1.2)
BUN SERPL-MCNC: 27 MG/DL (ref 8–23)
BUN/CREAT SERPL: 26.2 (ref 7–25)
CALCIUM SPEC-SCNC: 9.4 MG/DL (ref 8.6–10.5)
CHLORIDE SERPL-SCNC: 107 MMOL/L (ref 98–107)
CO2 SERPL-SCNC: 26 MMOL/L (ref 22–29)
CREAT SERPL-MCNC: 1.03 MG/DL (ref 0.76–1.27)
DEPRECATED RDW RBC AUTO: 44.8 FL (ref 37–54)
EGFRCR SERPLBLD CKD-EPI 2021: 79.6 ML/MIN/1.73
EOSINOPHIL # BLD AUTO: 0.12 10*3/MM3 (ref 0–0.4)
EOSINOPHIL NFR BLD AUTO: 1.3 % (ref 0.3–6.2)
ERYTHROCYTE [DISTWIDTH] IN BLOOD BY AUTOMATED COUNT: 13.6 % (ref 12.3–15.4)
GLOBULIN UR ELPH-MCNC: 2.5 GM/DL
GLUCOSE SERPL-MCNC: 90 MG/DL (ref 65–99)
HCT VFR BLD AUTO: 45.8 % (ref 37.5–51)
HGB BLD-MCNC: 14.8 G/DL (ref 13–17.7)
HOLD SPECIMEN: NORMAL
IMM GRANULOCYTES # BLD AUTO: 0.03 10*3/MM3 (ref 0–0.05)
IMM GRANULOCYTES NFR BLD AUTO: 0.3 % (ref 0–0.5)
LIPASE SERPL-CCNC: 59 U/L (ref 13–60)
LYMPHOCYTES # BLD AUTO: 1.2 10*3/MM3 (ref 0.7–3.1)
LYMPHOCYTES NFR BLD AUTO: 12.9 % (ref 19.6–45.3)
MCH RBC QN AUTO: 29.3 PG (ref 26.6–33)
MCHC RBC AUTO-ENTMCNC: 32.3 G/DL (ref 31.5–35.7)
MCV RBC AUTO: 90.7 FL (ref 79–97)
MONOCYTES # BLD AUTO: 0.59 10*3/MM3 (ref 0.1–0.9)
MONOCYTES NFR BLD AUTO: 6.3 % (ref 5–12)
NEUTROPHILS NFR BLD AUTO: 7.34 10*3/MM3 (ref 1.7–7)
NEUTROPHILS NFR BLD AUTO: 79 % (ref 42.7–76)
NRBC BLD AUTO-RTO: 0 /100 WBC (ref 0–0.2)
NT-PROBNP SERPL-MCNC: 60.6 PG/ML (ref 0–900)
PLATELET # BLD AUTO: 179 10*3/MM3 (ref 140–450)
PMV BLD AUTO: 9.7 FL (ref 6–12)
POTASSIUM SERPL-SCNC: 4 MMOL/L (ref 3.5–5.2)
PROT SERPL-MCNC: 6.7 G/DL (ref 6–8.5)
QT INTERVAL: 418 MS
QT INTERVAL: 428 MS
QTC INTERVAL: 370 MS
QTC INTERVAL: 413 MS
RBC # BLD AUTO: 5.05 10*6/MM3 (ref 4.14–5.8)
SODIUM SERPL-SCNC: 142 MMOL/L (ref 136–145)
TROPONIN T SERPL-MCNC: <0.01 NG/ML (ref 0–0.03)
TROPONIN T SERPL-MCNC: <0.01 NG/ML (ref 0–0.03)
WBC NRBC COR # BLD: 9.3 10*3/MM3 (ref 3.4–10.8)
WHOLE BLOOD HOLD COAG: NORMAL
WHOLE BLOOD HOLD SPECIMEN: NORMAL

## 2022-09-30 PROCEDURE — 25010000002 ONDANSETRON PER 1 MG: Performed by: EMERGENCY MEDICINE

## 2022-09-30 PROCEDURE — 99284 EMERGENCY DEPT VISIT MOD MDM: CPT

## 2022-09-30 PROCEDURE — 83880 ASSAY OF NATRIURETIC PEPTIDE: CPT | Performed by: EMERGENCY MEDICINE

## 2022-09-30 PROCEDURE — 93005 ELECTROCARDIOGRAM TRACING: CPT | Performed by: EMERGENCY MEDICINE

## 2022-09-30 PROCEDURE — 36415 COLL VENOUS BLD VENIPUNCTURE: CPT

## 2022-09-30 PROCEDURE — 71045 X-RAY EXAM CHEST 1 VIEW: CPT

## 2022-09-30 PROCEDURE — 93005 ELECTROCARDIOGRAM TRACING: CPT

## 2022-09-30 PROCEDURE — 96375 TX/PRO/DX INJ NEW DRUG ADDON: CPT

## 2022-09-30 PROCEDURE — 96374 THER/PROPH/DIAG INJ IV PUSH: CPT

## 2022-09-30 PROCEDURE — 84484 ASSAY OF TROPONIN QUANT: CPT | Performed by: EMERGENCY MEDICINE

## 2022-09-30 PROCEDURE — 25010000002 KETOROLAC TROMETHAMINE PER 15 MG: Performed by: EMERGENCY MEDICINE

## 2022-09-30 PROCEDURE — 85025 COMPLETE CBC W/AUTO DIFF WBC: CPT | Performed by: EMERGENCY MEDICINE

## 2022-09-30 PROCEDURE — 83690 ASSAY OF LIPASE: CPT | Performed by: EMERGENCY MEDICINE

## 2022-09-30 PROCEDURE — 80053 COMPREHEN METABOLIC PANEL: CPT | Performed by: EMERGENCY MEDICINE

## 2022-09-30 RX ORDER — PANTOPRAZOLE SODIUM 40 MG/10ML
40 INJECTION, POWDER, LYOPHILIZED, FOR SOLUTION INTRAVENOUS ONCE
Status: COMPLETED | OUTPATIENT
Start: 2022-09-30 | End: 2022-09-30

## 2022-09-30 RX ORDER — SODIUM CHLORIDE 0.9 % (FLUSH) 0.9 %
10 SYRINGE (ML) INJECTION AS NEEDED
Status: DISCONTINUED | OUTPATIENT
Start: 2022-09-30 | End: 2022-09-30 | Stop reason: HOSPADM

## 2022-09-30 RX ORDER — GABAPENTIN 400 MG/1
800 CAPSULE ORAL ONCE
Status: COMPLETED | OUTPATIENT
Start: 2022-09-30 | End: 2022-09-30

## 2022-09-30 RX ORDER — SEMAGLUTIDE 1.34 MG/ML
INJECTION, SOLUTION SUBCUTANEOUS WEEKLY
COMMUNITY
Start: 2022-08-29

## 2022-09-30 RX ORDER — KETOROLAC TROMETHAMINE 15 MG/ML
15 INJECTION, SOLUTION INTRAMUSCULAR; INTRAVENOUS ONCE
Status: COMPLETED | OUTPATIENT
Start: 2022-09-30 | End: 2022-09-30

## 2022-09-30 RX ORDER — PROMETHAZINE HYDROCHLORIDE 25 MG/1
25 TABLET ORAL EVERY 6 HOURS PRN
Qty: 12 TABLET | Refills: 0 | Status: SHIPPED | OUTPATIENT
Start: 2022-09-30 | End: 2023-01-25

## 2022-09-30 RX ORDER — HYDROCHLOROTHIAZIDE 12.5 MG/1
12.5 TABLET ORAL DAILY
COMMUNITY
Start: 2022-07-21

## 2022-09-30 RX ORDER — ONDANSETRON 2 MG/ML
4 INJECTION INTRAMUSCULAR; INTRAVENOUS ONCE
Status: COMPLETED | OUTPATIENT
Start: 2022-09-30 | End: 2022-09-30

## 2022-09-30 RX ORDER — ASPIRIN 81 MG/1
324 TABLET, CHEWABLE ORAL ONCE
Status: COMPLETED | OUTPATIENT
Start: 2022-09-30 | End: 2022-09-30

## 2022-09-30 RX ADMIN — GABAPENTIN 800 MG: 400 CAPSULE ORAL at 11:55

## 2022-09-30 RX ADMIN — KETOROLAC TROMETHAMINE 15 MG: 15 INJECTION, SOLUTION INTRAMUSCULAR; INTRAVENOUS at 10:35

## 2022-09-30 RX ADMIN — PANTOPRAZOLE SODIUM 40 MG: 40 INJECTION, POWDER, FOR SOLUTION INTRAVENOUS at 10:35

## 2022-09-30 RX ADMIN — SODIUM CHLORIDE 1000 ML: 9 INJECTION, SOLUTION INTRAVENOUS at 10:35

## 2022-09-30 RX ADMIN — ASPIRIN 81 MG 324 MG: 81 TABLET ORAL at 10:34

## 2022-09-30 RX ADMIN — ONDANSETRON 4 MG: 2 INJECTION INTRAMUSCULAR; INTRAVENOUS at 13:32

## 2022-09-30 NOTE — PROGRESS NOTES
"You have chosen to receive care through a telehealth visit.  Do you consent to use a video/audio connection for your medical care today? {YES NO:80744::\"Yes\"}     CHIEF COMPLAINT  No chief complaint on file.        HPI  Gonzalo Oates is a 67 y.o. male  presents with complaint of    Review of Systems    Past Medical History:   Diagnosis Date   • Ankle fracture     left 02/2015, Dr Bose did sx   • Anxiety    • Arthritis    • Asthma    • BPH (benign prostatic hyperplasia)    • Cataract    • Chronic back pain    • Chronic sinusitis    • Colon polyps    • Constipation     OPIOD INDUCED   • CTS (carpal tunnel syndrome)    • Deep vein thrombosis (DVT) of iliac vein (HCC)    • Depression    • Diabetes mellitus (HCC)     PRE DIABETES NO MEDS   • Difficulty walking    • Erectile dysfunction    • GERD (gastroesophageal reflux disease)    • Headache, tension-type    • Herniated disc, cervical    • HL (hearing loss)    • Hyperlipidemia    • Hypertension    • IBS (irritable bowel syndrome)    • Left knee pain    • Lumbar herniated disc    • Memory loss    • Migraine headache    • Mitral valve prolapse    • Movement disorder    • Narcolepsy    • Obesity    • KO (obstructive sleep apnea)     non compliant with CPAP    • Peripheral vascular disease (HCC)    • Prediabetes    • Restless leg     supressed with narcotics    • Sleep apnea    • Snoring    • SOB (shortness of breath)    • Umbilical hernia    • UTI (urinary tract infection)    • Varicose veins    • Venous insufficiency    • Wears glasses        Family History   Problem Relation Age of Onset   • Cancer Other    • Diabetes Other    • Heart disease Other    • Hypertension Other    • Endocrine tumor Mother    • Anxiety disorder Mother    • Arthritis Mother    • Hypertension Mother    • Seizures Mother         Due to brain surgery   • Heart attack Father    • Hypertension Father    • Glaucoma Father    • Cancer Maternal Grandmother    • Heart attack Paternal Grandfather  " "  • Hypertension Brother    • Neuropathy Brother    • Hypertension Sister    • Migraines Sister        Social History     Socioeconomic History   • Marital status:    Tobacco Use   • Smoking status: Never Smoker   • Smokeless tobacco: Never Used   • Tobacco comment: Never used.   Vaping Use   • Vaping Use: Never used   Substance and Sexual Activity   • Alcohol use: Yes     Types: 2 Shots of liquor per week     Comment: per week   • Drug use: No   • Sexual activity: Never       Gonzalo Oates  reports that he has never smoked. He has never used smokeless tobacco.. I have educated him on the risk of diseases from using tobacco products such as {Tobacco Cessation Diseases:06461::\"cancer\",\"COPD\",\"heart disease\"}.     I advised him to quit and he is {Willing/Not Willing to Quit Tobacco Products:36572}.    I spent {Time Spent Tobacco :91709} minutes counseling the patient.              There were no vitals taken for this visit.    PHYSICAL EXAM  Virtual Visit Physical Exam    Results for orders placed or performed in visit on 05/31/22   Comprehensive Metabolic Panel    Specimen: Blood   Result Value Ref Range    Glucose 94 65 - 99 mg/dL    BUN 22 8 - 23 mg/dL    Creatinine 0.97 0.76 - 1.27 mg/dL    Sodium 142 136 - 145 mmol/L    Potassium 3.7 3.5 - 5.2 mmol/L    Chloride 104 98 - 107 mmol/L    CO2 27.0 22.0 - 29.0 mmol/L    Calcium 9.5 8.6 - 10.5 mg/dL    Total Protein 6.3 6.0 - 8.5 g/dL    Albumin 4.30 3.50 - 5.20 g/dL    ALT (SGPT) 29 1 - 41 U/L    AST (SGOT) 24 1 - 40 U/L    Alkaline Phosphatase 76 39 - 117 U/L    Total Bilirubin 0.3 0.0 - 1.2 mg/dL    Globulin 2.0 gm/dL    A/G Ratio 2.2 g/dL    BUN/Creatinine Ratio 22.7 7.0 - 25.0    Anion Gap 11.0 5.0 - 15.0 mmol/L    eGFR 85.6 >60.0 mL/min/1.73   Lipid Panel    Specimen: Blood   Result Value Ref Range    Total Cholesterol 153 0 - 200 mg/dL    Triglycerides 58 0 - 150 mg/dL    HDL Cholesterol 50 40 - 60 mg/dL    LDL Cholesterol  91 0 - 100 mg/dL    VLDL " Cholesterol 12 5 - 40 mg/dL    LDL/HDL Ratio 1.83    Hemoglobin A1c    Specimen: Blood   Result Value Ref Range    Hemoglobin A1C 5.60 4.80 - 5.60 %   PSA Screen    Specimen: Blood   Result Value Ref Range    PSA 0.605 0.000 - 4.000 ng/mL   Vitamin D 25 Hydroxy    Specimen: Blood   Result Value Ref Range    25 Hydroxy, Vitamin D 42.1 30.0 - 100.0 ng/ml       There are no diagnoses linked to this encounter.      FOLLOW-UP  As discussed during visit with PCP/[facility] if no improvement or Urgent Care/Emergency Department if worsening of symptoms    Patient verbalizes understanding of medication dosage, comfort measures, instructions for treatment and follow-up.    ZAHIDA Winkler  09/30/2022  06:37 EDT    The use of a video visit has been reviewed with the patient and verbal informed consent has been obtained. Myself and Gonzalo Oates participated in this visit. The patient is located in [address] [place] [address].    I am located in [place], [place]. Mychart and Zoom were utilized. I spent *** minutes in the patient's chart for this visit.

## 2022-09-30 NOTE — PROGRESS NOTES
HPI  Gonzalo Oates is a 67 y.o. male  presents with complaint of sore throat for 1 week. Yesterday started with nausea, then vomit x3 hours last night, then developed diarrhea. Now reporting nausea is continuing. Has taken promethazine 25mg but it is 6 years old and not helping. Also states his chest started hurting yesterday, took Pepcid complete but that did not help any and continues to have chest pain. Had an EKG that was abnormal last week; wore a holter monitor which his cardiologist stated was ok. Also recently increased his ozempic, which is has caused nausea in the past but never vomiting or CP. Temp currently 97. Denies SOA.         Review of Systems    Past Medical History:   Diagnosis Date   • Ankle fracture     left 02/2015, Dr Bose did sx   • Anxiety    • Arthritis    • Asthma    • BPH (benign prostatic hyperplasia)    • Cataract    • Chronic back pain    • Chronic sinusitis    • Colon polyps    • Constipation     OPIOD INDUCED   • CTS (carpal tunnel syndrome)    • Deep vein thrombosis (DVT) of iliac vein (HCC)    • Depression    • Diabetes mellitus (HCC)     PRE DIABETES NO MEDS   • Difficulty walking    • Erectile dysfunction    • GERD (gastroesophageal reflux disease)    • Headache, tension-type    • Herniated disc, cervical    • HL (hearing loss)    • Hyperlipidemia    • Hypertension    • IBS (irritable bowel syndrome)    • Left knee pain    • Lumbar herniated disc    • Memory loss    • Migraine headache    • Mitral valve prolapse    • Movement disorder    • Narcolepsy    • Obesity    • KO (obstructive sleep apnea)     non compliant with CPAP    • Peripheral vascular disease (HCC)    • Prediabetes    • Restless leg     supressed with narcotics    • Sleep apnea    • Snoring    • SOB (shortness of breath)    • Umbilical hernia    • UTI (urinary tract infection)    • Varicose veins    • Venous insufficiency    • Wears glasses        Family History   Problem Relation Age of Onset   • Cancer  Other    • Diabetes Other    • Heart disease Other    • Hypertension Other    • Endocrine tumor Mother    • Anxiety disorder Mother    • Arthritis Mother    • Hypertension Mother    • Seizures Mother         Due to brain surgery   • Heart attack Father    • Hypertension Father    • Glaucoma Father    • Cancer Maternal Grandmother    • Heart attack Paternal Grandfather    • Hypertension Brother    • Neuropathy Brother    • Hypertension Sister    • Migraines Sister        Social History     Socioeconomic History   • Marital status:    Tobacco Use   • Smoking status: Never Smoker   • Smokeless tobacco: Never Used   • Tobacco comment: Never used.   Vaping Use   • Vaping Use: Never used   Substance and Sexual Activity   • Alcohol use: Yes     Types: 2 Shots of liquor per week     Comment: per week   • Drug use: No   • Sexual activity: Never         There were no vitals taken for this visit.    PHYSICAL EXAM  Physical Exam   Constitutional: He appears well-developed and well-nourished.   HENT:   Head: Normocephalic.   Nose: Nose normal.   Neck: Neck normal appearance.  Pulmonary/Chest: Effort normal.   Neurological: He is alert.   Psychiatric: He has a normal mood and affect. His speech is normal.       Diagnoses and all orders for this visit:    1. Chest pain, unspecified type (Primary)    2. Nausea vomiting and diarrhea      *Instructed patient to go to PCP, UC, or ER now. Pt verbalized understanding and stated he would go.         Paulina Foote, APRN  09/30/2022  07:27 EDT    The use of a video visit has been reviewed with the patient and verbal informed consent has been obtained. Myself and Gonzalo Oates participated in this visit. The patient is located in [place], [place], and I am located in [place], [place]. MyChart and Zoom were utilized.

## 2022-12-08 ENCOUNTER — TRANSCRIBE ORDERS (OUTPATIENT)
Dept: ADMINISTRATIVE | Facility: HOSPITAL | Age: 67
End: 2022-12-08

## 2022-12-08 DIAGNOSIS — M48.02 CERVICAL STENOSIS OF SPINAL CANAL: Primary | ICD-10-CM

## 2022-12-28 ENCOUNTER — HOSPITAL ENCOUNTER (EMERGENCY)
Facility: HOSPITAL | Age: 67
Discharge: HOME | DRG: 603 | End: 2022-12-28
Attending: EMERGENCY MEDICINE | Admitting: EMERGENCY MEDICINE
Payer: MEDICARE

## 2022-12-28 VITALS
DIASTOLIC BLOOD PRESSURE: 105 MMHG | SYSTOLIC BLOOD PRESSURE: 173 MMHG | BODY MASS INDEX: 31.33 KG/M2 | WEIGHT: 223.8 LBS | TEMPERATURE: 98.1 F | RESPIRATION RATE: 18 BRPM | HEIGHT: 71 IN | OXYGEN SATURATION: 97 % | HEART RATE: 66 BPM

## 2022-12-28 DIAGNOSIS — L03.90 CELLULITIS, UNSPECIFIED CELLULITIS SITE: Primary | ICD-10-CM

## 2022-12-28 PROCEDURE — 99283 EMERGENCY DEPT VISIT LOW MDM: CPT

## 2022-12-28 RX ORDER — LIDOCAINE HYDROCHLORIDE 20 MG/ML
JELLY TOPICAL ONCE
Status: COMPLETED | OUTPATIENT
Start: 2022-12-28 | End: 2022-12-28

## 2022-12-28 RX ORDER — CLINDAMYCIN HYDROCHLORIDE 150 MG/1
450 CAPSULE ORAL 3 TIMES DAILY
Qty: 90 CAPSULE | Refills: 0 | Status: SHIPPED | OUTPATIENT
Start: 2022-12-28 | End: 2022-12-31 | Stop reason: HOSPADM

## 2022-12-28 RX ORDER — CLINDAMYCIN HYDROCHLORIDE 150 MG/1
450 CAPSULE ORAL ONCE
Status: COMPLETED | OUTPATIENT
Start: 2022-12-28 | End: 2022-12-28

## 2022-12-28 RX ADMIN — CLINDAMYCIN HYDROCHLORIDE 450 MG: 150 CAPSULE ORAL at 06:18

## 2022-12-28 RX ADMIN — LIDOCAINE HYDROCHLORIDE: 20 JELLY TOPICAL at 06:19

## 2022-12-28 NOTE — ED PROVIDER NOTES
TRIAGE CHIEF COMPLAINT:     Nursing and triage notes reviewed    Chief Complaint   Patient presents with   • Chemical Exposure      HPI: Gonzalo Oates is a 67 y.o. male who presents to the emergency department complaining of pain and redness to his right lower extremity.  Patient states 1 week previously he accidentally was exposed to sulfuric acid.  He states that he got a small amount on his close and started having some burning on his bilateral shins.  He states when he had gotten home he noticed a blister in the mid right shin.  He washed his legs off and remove the clothing.  He states the left leg has been fine but the right leg has developed some spreading redness over the past week.  He denies having a fever.  He states he called a nurse hotline and was directed to the emergency department for evaluation.    REVIEW OF SYSTEMS: All other systems reviewed and are negative     PAST MEDICAL HISTORY:   Past Medical History:   Diagnosis Date   • Ankle fracture     left 02/2015, Dr Bose did sx   • Anxiety    • Arthritis    • Asthma    • BPH (benign prostatic hyperplasia)    • Cataract    • Chronic back pain    • Chronic sinusitis    • Colon polyps    • Constipation     OPIOD INDUCED   • CTS (carpal tunnel syndrome)    • Deep vein thrombosis (DVT) of iliac vein (HCC)    • Depression    • Diabetes mellitus (HCC)     PRE DIABETES NO MEDS   • Difficulty walking    • Erectile dysfunction    • GERD (gastroesophageal reflux disease)    • Headache, tension-type    • Herniated disc, cervical    • HL (hearing loss)    • Hyperlipidemia    • Hypertension    • IBS (irritable bowel syndrome)    • Left knee pain    • Lumbar herniated disc    • Memory loss    • Migraine headache    • Mitral valve prolapse    • Movement disorder    • Narcolepsy    • Obesity    • KO (obstructive sleep apnea)     non compliant with CPAP    • Peripheral vascular disease (HCC)    • Prediabetes    • Restless leg     supressed with narcotics    •  Sleep apnea    • Snoring    • SOB (shortness of breath)    • Umbilical hernia    • UTI (urinary tract infection)    • Varicose veins    • Venous insufficiency    • Wears glasses         FAMILY HISTORY:   Family History   Problem Relation Age of Onset   • Cancer Other    • Diabetes Other    • Heart disease Other    • Hypertension Other    • Endocrine tumor Mother    • Anxiety disorder Mother    • Arthritis Mother    • Hypertension Mother    • Seizures Mother         Due to brain surgery   • Heart attack Father    • Hypertension Father    • Glaucoma Father    • Cancer Maternal Grandmother    • Heart attack Paternal Grandfather    • Hypertension Brother    • Neuropathy Brother    • Hypertension Sister    • Migraines Sister         SOCIAL HISTORY:   Social History     Socioeconomic History   • Marital status:    Tobacco Use   • Smoking status: Never   • Smokeless tobacco: Never   • Tobacco comments:     Never used.   Vaping Use   • Vaping Use: Never used   Substance and Sexual Activity   • Alcohol use: Yes     Types: 2 Shots of liquor per week     Comment: per week   • Drug use: No   • Sexual activity: Never        SURGICAL HISTORY:   Past Surgical History:   Procedure Laterality Date   • ANTERIOR LUMBAR FUSION W/ FRA  1983    L5-S1, in [place]   • APPENDECTOMY     • BACK SURGERY      hemilectomy, 1983   • CEREBRAL ANGIOGRAM     • FRACTURE SURGERY Left 2012    LEFT ANKLE, [facility], Dr Bose   • KNEE ARTHROSCOPY Left 06/27/2018    Procedure: diagnostic arthroscopy partial medial meniscectomy or other procedures as necessary, left knee;  Surgeon: Tj Hoffman MD;  Location: [facility];  Service: Orthopedics   • LUMBAR LAMINECTOMY WITH FUSION      L3-4, 2016, Dr Weber, Dr Caruso   • SINUS SURGERY     • TONSILLECTOMY     • ULNAR NERVE TRANSPOSITION      in [place] 2006   • VASCULAR SURGERY     • VASECTOMY     • WISDOM TOOTH EXTRACTION          CURRENT MEDICATIONS:      Medication List      ASK your doctor  about these medications    clonazePAM 0.5 MG tablet  Commonly known as: KlonoPIN  Take 1 tablet by mouth 2 (Two) Times a Day As Needed for Anxiety.     cyclobenzaprine 10 MG tablet  Commonly known as: FLEXERIL  Take 1 tablet by mouth 3 (Three) Times a Day As Needed for Muscle Spasms.     gabapentin 800 MG tablet  Commonly known as: NEURONTIN  Take 1 tablet by mouth 3 (Three) Times a Day.     hydroCHLOROthiazide 12.5 MG tablet  Commonly known as: HYDRODIURIL     lisinopril 20 MG tablet  Commonly known as: PRINIVIL,ZESTRIL     metFORMIN  MG 24 hr tablet  Commonly known as: GLUCOPHAGE-XR     naproxen 500 MG tablet  Commonly known as: NAPROSYN     omeprazole 40 MG capsule  Commonly known as: priLOSEC  TAKE 1 CAPSULE EVERY DAY     Ozempic (1 MG/DOSE) 4 MG/3ML solution pen-injector  Generic drug: Semaglutide (1 MG/DOSE)     pramipexole 1 MG tablet  Commonly known as: MIRAPEX     pravastatin 40 MG tablet  Commonly known as: PRAVACHOL  TAKE 1 TABLET EVERY EVENING     promethazine 25 MG tablet  Commonly known as: PHENERGAN  Take 1 tablet by mouth Every 6 (Six) Hours As Needed for Nausea or Vomiting.     sertraline 100 MG tablet  Commonly known as: ZOLOFT     spironolactone 25 MG tablet  Commonly known as: ALDACTONE  TAKE 1 TABLET EVERY DAY     vitamin D3 125 MCG (5000 UT) capsule capsule             ALLERGIES: Adhesive tape, Calcium channel blockers, Nickel, Chlorhexidine, and Zinc oxide     PHYSICAL EXAM:   VITAL SIGNS:   Vitals:    12/28/22 0511   BP: (!) 173/105   Pulse: 66   Resp: 18   Temp: 98.1 °F (36.7 °C)   SpO2: 97%      CONSTITUTIONAL: Awake, oriented, appears nontoxic   HENT: Atraumatic, normocephalic, oral mucosa pink and moist, airway patent. Nares patent without drainage. External ears normal.   EYES: Conjunctivae clear  NECK: Trachea midline   CARDIOVASCULAR: Normal heart rate, Normal rhythm, No murmurs, rubs, gallops   PULMONARY/CHEST: Clear to auscultation, no rhonchi, wheezes, or rales. Symmetrical  breath sounds.  ABDOMINAL: Nondistended, soft, nontender - no rebound or guarding.  NEUROLOGIC: Nonfocal, moving all four extremities, no gross sensory or motor deficits.   EXTREMITIES: No clubbing, cyanosis.  There is a small shallow ulcerated lesion on the mid anterior right shin, there is surrounding erythema that is warm to touch.  No evidence for abscess, no drainage  SKIN: Warm, Dry, findings as above    ED COURSE / MEDICAL DECISION MAKING:   Gonzalo Oates is a 67 y.o. male who presents to the emergency department for evaluation of redness and pain in the right lower extremity.  Patient is nondistressed on arrival in the emergency department.  He is mildly hypertensive.  Other vital signs are stable.    Differential diagnosis includes cellulitis, abscess, chemical burn among other etiologies.    No further testing was ordered for further evaluation of the patient's presentation.    Chart review including any outside testing was not indicated.    Patient was treated with antibiotics, topical lidocaine, and a wound dressing.    Physical examination on arrival is consistent with a developing cellulitis which is likely developed from his burn.  There is a small open ulceration from the initial blistering.  There is no evidence of deep injury or abscess at this time.  Patient's vital signs are stable and he is afebrile, I do not feel further labs are indicated at this time.  Will initiate antibiotic therapy.  We will have patient return in 48 hours if symptoms or not improving.  Patient expressed understanding and was comfortable with this plan and discharged in good condition.    DECISION TO DISCHARGE/ADMIT: see ED care timeline     FINAL IMPRESSION:   1 --cellulitis  2 --   3 --     Electronically signed by: Shelly De La O MD, 12/28/2022 06:05 Shelly Harvey MD  12/28/22 0610

## 2022-12-30 ENCOUNTER — HOSPITAL ENCOUNTER (INPATIENT)
Facility: HOSPITAL | Age: 67
LOS: 1 days | Discharge: HOME | DRG: 603 | End: 2022-12-31
Attending: STUDENT IN AN ORGANIZED HEALTH CARE EDUCATION/TRAINING PROGRAM | Admitting: STUDENT IN AN ORGANIZED HEALTH CARE EDUCATION/TRAINING PROGRAM
Payer: MEDICARE

## 2022-12-30 ENCOUNTER — APPOINTMENT (OUTPATIENT)
Dept: GENERAL RADIOLOGY | Facility: HOSPITAL | Age: 67
DRG: 603 | End: 2022-12-30
Payer: MEDICARE

## 2022-12-30 DIAGNOSIS — L03.115 CELLULITIS OF RIGHT LOWER EXTREMITY: Primary | ICD-10-CM

## 2022-12-30 PROBLEM — L03.90 CELLULITIS: Status: ACTIVE | Noted: 2022-12-30

## 2022-12-30 LAB
ANION GAP SERPL CALCULATED.3IONS-SCNC: 3.4 MMOL/L (ref 5–15)
BASOPHILS # BLD AUTO: 0.04 10*3/MM3 (ref 0–0.2)
BASOPHILS NFR BLD AUTO: 0.9 % (ref 0–1.5)
BUN SERPL-MCNC: 15 MG/DL (ref 8–23)
BUN/CREAT SERPL: 18.3 (ref 7–25)
CALCIUM SPEC-SCNC: 9.5 MG/DL (ref 8.6–10.5)
CHLORIDE SERPL-SCNC: 107 MMOL/L (ref 98–107)
CO2 SERPL-SCNC: 29.6 MMOL/L (ref 22–29)
CREAT SERPL-MCNC: 0.82 MG/DL (ref 0.76–1.27)
CRP SERPL-MCNC: <0.3 MG/DL (ref 0–0.5)
DEPRECATED RDW RBC AUTO: 43.2 FL (ref 37–54)
EGFRCR SERPLBLD CKD-EPI 2021: 96.3 ML/MIN/1.73
EOSINOPHIL # BLD AUTO: 0.14 10*3/MM3 (ref 0–0.4)
EOSINOPHIL NFR BLD AUTO: 3 % (ref 0.3–6.2)
ERYTHROCYTE [DISTWIDTH] IN BLOOD BY AUTOMATED COUNT: 13.4 % (ref 12.3–15.4)
GLUCOSE BLDC GLUCOMTR-MCNC: 84 MG/DL (ref 70–130)
GLUCOSE BLDC GLUCOMTR-MCNC: 91 MG/DL (ref 70–130)
GLUCOSE SERPL-MCNC: 95 MG/DL (ref 65–99)
HBA1C MFR BLD: 5.2 % (ref 4.8–5.6)
HCT VFR BLD AUTO: 44.2 % (ref 37.5–51)
HGB BLD-MCNC: 14.3 G/DL (ref 13–17.7)
IMM GRANULOCYTES # BLD AUTO: 0.01 10*3/MM3 (ref 0–0.05)
IMM GRANULOCYTES NFR BLD AUTO: 0.2 % (ref 0–0.5)
LYMPHOCYTES # BLD AUTO: 1.15 10*3/MM3 (ref 0.7–3.1)
LYMPHOCYTES NFR BLD AUTO: 24.7 % (ref 19.6–45.3)
MCH RBC QN AUTO: 28.6 PG (ref 26.6–33)
MCHC RBC AUTO-ENTMCNC: 32.4 G/DL (ref 31.5–35.7)
MCV RBC AUTO: 88.4 FL (ref 79–97)
MONOCYTES # BLD AUTO: 0.55 10*3/MM3 (ref 0.1–0.9)
MONOCYTES NFR BLD AUTO: 11.8 % (ref 5–12)
NEUTROPHILS NFR BLD AUTO: 2.76 10*3/MM3 (ref 1.7–7)
NEUTROPHILS NFR BLD AUTO: 59.4 % (ref 42.7–76)
NRBC BLD AUTO-RTO: 0 /100 WBC (ref 0–0.2)
PLATELET # BLD AUTO: 149 10*3/MM3 (ref 140–450)
PMV BLD AUTO: 10 FL (ref 6–12)
POTASSIUM SERPL-SCNC: 4.5 MMOL/L (ref 3.5–5.2)
RBC # BLD AUTO: 5 10*6/MM3 (ref 4.14–5.8)
SODIUM SERPL-SCNC: 140 MMOL/L (ref 136–145)
WBC NRBC COR # BLD: 4.65 10*3/MM3 (ref 3.4–10.8)

## 2022-12-30 PROCEDURE — 73590 X-RAY EXAM OF LOWER LEG: CPT

## 2022-12-30 PROCEDURE — 82962 GLUCOSE BLOOD TEST: CPT

## 2022-12-30 PROCEDURE — 80048 BASIC METABOLIC PNL TOTAL CA: CPT | Performed by: STUDENT IN AN ORGANIZED HEALTH CARE EDUCATION/TRAINING PROGRAM

## 2022-12-30 PROCEDURE — 87070 CULTURE OTHR SPECIMN AEROBIC: CPT | Performed by: STUDENT IN AN ORGANIZED HEALTH CARE EDUCATION/TRAINING PROGRAM

## 2022-12-30 PROCEDURE — 25010000002 VANCOMYCIN 5 G RECONSTITUTED SOLUTION: Performed by: STUDENT IN AN ORGANIZED HEALTH CARE EDUCATION/TRAINING PROGRAM

## 2022-12-30 PROCEDURE — 99223 1ST HOSP IP/OBS HIGH 75: CPT | Performed by: STUDENT IN AN ORGANIZED HEALTH CARE EDUCATION/TRAINING PROGRAM

## 2022-12-30 PROCEDURE — 85025 COMPLETE CBC W/AUTO DIFF WBC: CPT | Performed by: STUDENT IN AN ORGANIZED HEALTH CARE EDUCATION/TRAINING PROGRAM

## 2022-12-30 PROCEDURE — 99284 EMERGENCY DEPT VISIT MOD MDM: CPT

## 2022-12-30 PROCEDURE — 87147 CULTURE TYPE IMMUNOLOGIC: CPT | Performed by: STUDENT IN AN ORGANIZED HEALTH CARE EDUCATION/TRAINING PROGRAM

## 2022-12-30 PROCEDURE — 87205 SMEAR GRAM STAIN: CPT | Performed by: STUDENT IN AN ORGANIZED HEALTH CARE EDUCATION/TRAINING PROGRAM

## 2022-12-30 PROCEDURE — 25010000002 ENOXAPARIN PER 10 MG: Performed by: STUDENT IN AN ORGANIZED HEALTH CARE EDUCATION/TRAINING PROGRAM

## 2022-12-30 PROCEDURE — 36415 COLL VENOUS BLD VENIPUNCTURE: CPT

## 2022-12-30 PROCEDURE — 83036 HEMOGLOBIN GLYCOSYLATED A1C: CPT | Performed by: STUDENT IN AN ORGANIZED HEALTH CARE EDUCATION/TRAINING PROGRAM

## 2022-12-30 PROCEDURE — 86140 C-REACTIVE PROTEIN: CPT | Performed by: STUDENT IN AN ORGANIZED HEALTH CARE EDUCATION/TRAINING PROGRAM

## 2022-12-30 RX ORDER — SODIUM CHLORIDE 0.9 % (FLUSH) 0.9 %
10 SYRINGE (ML) INJECTION EVERY 12 HOURS SCHEDULED
Status: DISCONTINUED | OUTPATIENT
Start: 2022-12-30 | End: 2022-12-31 | Stop reason: HOSPADM

## 2022-12-30 RX ORDER — GABAPENTIN 400 MG/1
800 CAPSULE ORAL EVERY 8 HOURS SCHEDULED
Status: DISCONTINUED | OUTPATIENT
Start: 2022-12-30 | End: 2022-12-31 | Stop reason: HOSPADM

## 2022-12-30 RX ORDER — ENOXAPARIN SODIUM 100 MG/ML
40 INJECTION SUBCUTANEOUS DAILY
Status: DISCONTINUED | OUTPATIENT
Start: 2022-12-30 | End: 2022-12-31 | Stop reason: HOSPADM

## 2022-12-30 RX ORDER — PRAVASTATIN SODIUM 20 MG
40 TABLET ORAL NIGHTLY
Status: DISCONTINUED | OUTPATIENT
Start: 2022-12-30 | End: 2022-12-31 | Stop reason: HOSPADM

## 2022-12-30 RX ORDER — PANTOPRAZOLE SODIUM 40 MG/1
40 TABLET, DELAYED RELEASE ORAL EVERY MORNING
Status: DISCONTINUED | OUTPATIENT
Start: 2022-12-31 | End: 2022-12-31 | Stop reason: HOSPADM

## 2022-12-30 RX ORDER — ONDANSETRON 2 MG/ML
4 INJECTION INTRAMUSCULAR; INTRAVENOUS EVERY 6 HOURS PRN
Status: DISCONTINUED | OUTPATIENT
Start: 2022-12-30 | End: 2022-12-31 | Stop reason: HOSPADM

## 2022-12-30 RX ORDER — ACETAMINOPHEN 650 MG/1
650 SUPPOSITORY RECTAL EVERY 4 HOURS PRN
Status: DISCONTINUED | OUTPATIENT
Start: 2022-12-30 | End: 2022-12-31 | Stop reason: HOSPADM

## 2022-12-30 RX ORDER — DEXTROSE MONOHYDRATE 25 G/50ML
25 INJECTION, SOLUTION INTRAVENOUS
Status: DISCONTINUED | OUTPATIENT
Start: 2022-12-30 | End: 2022-12-31 | Stop reason: HOSPADM

## 2022-12-30 RX ORDER — SODIUM CHLORIDE 9 MG/ML
40 INJECTION, SOLUTION INTRAVENOUS AS NEEDED
Status: DISCONTINUED | OUTPATIENT
Start: 2022-12-30 | End: 2022-12-31 | Stop reason: HOSPADM

## 2022-12-30 RX ORDER — NICOTINE POLACRILEX 4 MG
15 LOZENGE BUCCAL
Status: DISCONTINUED | OUTPATIENT
Start: 2022-12-30 | End: 2022-12-31 | Stop reason: HOSPADM

## 2022-12-30 RX ORDER — OXYCODONE HYDROCHLORIDE AND ACETAMINOPHEN 5; 325 MG/1; MG/1
1 TABLET ORAL EVERY 6 HOURS PRN
Status: DISCONTINUED | OUTPATIENT
Start: 2022-12-30 | End: 2022-12-31 | Stop reason: HOSPADM

## 2022-12-30 RX ORDER — NAPROXEN 500 MG/1
500 TABLET ORAL 2 TIMES DAILY WITH MEALS
Status: DISCONTINUED | OUTPATIENT
Start: 2022-12-30 | End: 2022-12-31 | Stop reason: HOSPADM

## 2022-12-30 RX ORDER — SPIRONOLACTONE 25 MG/1
25 TABLET ORAL DAILY
Status: DISCONTINUED | OUTPATIENT
Start: 2022-12-31 | End: 2022-12-31 | Stop reason: HOSPADM

## 2022-12-30 RX ORDER — CYCLOBENZAPRINE HCL 10 MG
10 TABLET ORAL 3 TIMES DAILY PRN
Status: DISCONTINUED | OUTPATIENT
Start: 2022-12-30 | End: 2022-12-31 | Stop reason: HOSPADM

## 2022-12-30 RX ORDER — DULOXETIN HYDROCHLORIDE 60 MG/1
60 CAPSULE, DELAYED RELEASE ORAL NIGHTLY
COMMUNITY

## 2022-12-30 RX ORDER — ACETAMINOPHEN 160 MG/5ML
650 SOLUTION ORAL EVERY 4 HOURS PRN
Status: DISCONTINUED | OUTPATIENT
Start: 2022-12-30 | End: 2022-12-31 | Stop reason: HOSPADM

## 2022-12-30 RX ORDER — DULOXETIN HYDROCHLORIDE 30 MG/1
60 CAPSULE, DELAYED RELEASE ORAL NIGHTLY
Status: DISCONTINUED | OUTPATIENT
Start: 2022-12-30 | End: 2022-12-31 | Stop reason: HOSPADM

## 2022-12-30 RX ORDER — INSULIN ASPART 100 [IU]/ML
0-9 INJECTION, SOLUTION INTRAVENOUS; SUBCUTANEOUS
Status: DISCONTINUED | OUTPATIENT
Start: 2022-12-30 | End: 2022-12-31 | Stop reason: HOSPADM

## 2022-12-30 RX ORDER — PRAMIPEXOLE DIHYDROCHLORIDE 1 MG/1
1 TABLET ORAL EVERY 12 HOURS SCHEDULED
Status: DISCONTINUED | OUTPATIENT
Start: 2022-12-30 | End: 2022-12-31 | Stop reason: HOSPADM

## 2022-12-30 RX ORDER — OXYCODONE HYDROCHLORIDE AND ACETAMINOPHEN 5; 325 MG/1; MG/1
1 TABLET ORAL 2 TIMES DAILY PRN
COMMUNITY

## 2022-12-30 RX ORDER — PROMETHAZINE HYDROCHLORIDE 12.5 MG/1
25 TABLET ORAL EVERY 6 HOURS PRN
Status: DISCONTINUED | OUTPATIENT
Start: 2022-12-30 | End: 2022-12-31 | Stop reason: HOSPADM

## 2022-12-30 RX ORDER — CLONAZEPAM 0.5 MG/1
0.5 TABLET ORAL 2 TIMES DAILY PRN
Status: DISCONTINUED | OUTPATIENT
Start: 2022-12-30 | End: 2022-12-31 | Stop reason: HOSPADM

## 2022-12-30 RX ORDER — SODIUM CHLORIDE 0.9 % (FLUSH) 0.9 %
10 SYRINGE (ML) INJECTION AS NEEDED
Status: DISCONTINUED | OUTPATIENT
Start: 2022-12-30 | End: 2022-12-31 | Stop reason: HOSPADM

## 2022-12-30 RX ORDER — ACETAMINOPHEN 325 MG/1
650 TABLET ORAL EVERY 4 HOURS PRN
Status: DISCONTINUED | OUTPATIENT
Start: 2022-12-30 | End: 2022-12-31 | Stop reason: HOSPADM

## 2022-12-30 RX ORDER — LISINOPRIL 20 MG/1
20 TABLET ORAL DAILY
Status: DISCONTINUED | OUTPATIENT
Start: 2022-12-30 | End: 2022-12-31 | Stop reason: HOSPADM

## 2022-12-30 RX ADMIN — VANCOMYCIN HYDROCHLORIDE 1250 MG: 500 INJECTION, POWDER, LYOPHILIZED, FOR SOLUTION INTRAVENOUS at 21:10

## 2022-12-30 RX ADMIN — CYCLOBENZAPRINE 10 MG: 10 TABLET, FILM COATED ORAL at 14:30

## 2022-12-30 RX ADMIN — PRAVASTATIN SODIUM 40 MG: 20 TABLET ORAL at 20:52

## 2022-12-30 RX ADMIN — ENOXAPARIN SODIUM 40 MG: 100 INJECTION SUBCUTANEOUS at 14:15

## 2022-12-30 RX ADMIN — Medication 10 ML: at 21:10

## 2022-12-30 RX ADMIN — Medication 10 ML: at 14:16

## 2022-12-30 RX ADMIN — PRAMIPEXOLE DIHYDROCHLORIDE 1 MG: 1 TABLET ORAL at 20:52

## 2022-12-30 RX ADMIN — NAPROXEN 500 MG: 500 TABLET ORAL at 17:06

## 2022-12-30 RX ADMIN — VANCOMYCIN HYDROCHLORIDE 2000 MG: 500 INJECTION, POWDER, LYOPHILIZED, FOR SOLUTION INTRAVENOUS at 12:20

## 2022-12-30 RX ADMIN — GABAPENTIN 800 MG: 400 CAPSULE ORAL at 21:09

## 2022-12-30 RX ADMIN — DULOXETINE HYDROCHLORIDE 60 MG: 30 CAPSULE, DELAYED RELEASE ORAL at 20:52

## 2022-12-30 RX ADMIN — GABAPENTIN 800 MG: 400 CAPSULE ORAL at 14:16
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
## 2022-12-30 NOTE — PROGRESS NOTES
"Pharmacy Consult-Vancomycin Dosing    Gonzalo Oates is a  67 y.o. male receiving vancomycin therapy.     Indication: SSTI  Consulting Provider: Kerley    Goal AUC: 400-600 mg/:L*hr    Current Antimicrobial Therapy  Anti-Infectives (From admission, onward)      Ordered     Dose/Rate Route Frequency Start Stop    12/30/22 1243  Pharmacy to dose vancomycin        Ordering Provider: Kerley, Brian Joseph,      Does not apply Continuous PRN 12/30/22 1243 01/06/23 1242    12/30/22 1030  vancomycin 2000 mg/500 mL 0.9% NS IVPB ([facility])        Ordering Provider: Chaz Mcintyre MD    20 mg/kg × 96.6 kg Intravenous Once 12/30/22 1032 12/30/22 1220            Labs  Results from last 7 days   Lab Units 12/30/22  0933   WBC 10*3/mm3 4.65   CREATININE mg/dL 0.82      Estimated Creatinine Clearance: 105.8 mL/min (by C-G formula based on SCr of 0.82 mg/dL).  Temp Readings from Last 1 Encounters:   12/30/22 97.7 °F (36.5 °C) (Oral)       Microbiology Culture results  Microbiology Results (last 10 days)       Procedure Component Value - Date/Time    Wound Culture - Swab, Leg [[id number]] Collected: 12/30/22 1111    Lab Status: Preliminary result Specimen: Swab from Leg Updated: 12/30/22 1142     Gram Stain No WBCs or organisms seen            Evaluation of Dosing     Last Dose Received in the ED/Outside Facility: Yes  Is Patient on Dialysis or Renal Replacement: No    Ht - 180.3 cm (71\")  Wt - 101 kg (223 lb 8.7 oz)    Evaluation of Level                      Insight[employer] AUC Calculation    Current AUC:   237 mg/L*hr    Predicted Steady State AUC on Current Dose: New start  _________________________________    Predicted Steady State AUC on New Dose:   538 mg/L*hr    Assessment/Plan    Pharmacy to dose vancomycin for SSTI. Goal -600 mg/L*hr.  Patient received loading dose of vancomycin 2000mg (~19.8mg/kg) IV on 12/30 @ 1220. Initiate maintenance dose of vancomycin 1250mg (~12.4mg/kg) IV Q12hr on 12/30 @ 2100.  Assess " clearance by vancomycin random level on 1/1 @ 0600.  Pharmacy will continue to monitor renal function, cultures and sensitivities, and clinical status to adjust regimen as necessary.    Thank you for the consult,    Mitul Block, PharmD, BCPS   12/30/22 13:07 EST

## 2022-12-30 NOTE — PLAN OF CARE
Goal Outcome Evaluation:  Plan of Care Reviewed With: patient        Progress: no change  Outcome Evaluation: New admission for cellulits. IV abx given. Will continue to monitor.
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
## 2022-12-31 VITALS
WEIGHT: 223.55 LBS | TEMPERATURE: 97.8 F | RESPIRATION RATE: 17 BRPM | HEIGHT: 71 IN | DIASTOLIC BLOOD PRESSURE: 86 MMHG | OXYGEN SATURATION: 98 % | HEART RATE: 61 BPM | SYSTOLIC BLOOD PRESSURE: 127 MMHG | BODY MASS INDEX: 31.3 KG/M2

## 2022-12-31 LAB
ANION GAP SERPL CALCULATED.3IONS-SCNC: 7.3 MMOL/L (ref 5–15)
BASOPHILS # BLD AUTO: 0.03 10*3/MM3 (ref 0–0.2)
BASOPHILS NFR BLD AUTO: 0.7 % (ref 0–1.5)
BUN SERPL-MCNC: 16 MG/DL (ref 8–23)
BUN/CREAT SERPL: 17.2 (ref 7–25)
CALCIUM SPEC-SCNC: 9.4 MG/DL (ref 8.6–10.5)
CHLORIDE SERPL-SCNC: 106 MMOL/L (ref 98–107)
CO2 SERPL-SCNC: 27.7 MMOL/L (ref 22–29)
CREAT SERPL-MCNC: 0.93 MG/DL (ref 0.76–1.27)
DEPRECATED RDW RBC AUTO: 43.7 FL (ref 37–54)
EGFRCR SERPLBLD CKD-EPI 2021: 90 ML/MIN/1.73
EOSINOPHIL # BLD AUTO: 0.18 10*3/MM3 (ref 0–0.4)
EOSINOPHIL NFR BLD AUTO: 4 % (ref 0.3–6.2)
ERYTHROCYTE [DISTWIDTH] IN BLOOD BY AUTOMATED COUNT: 13.4 % (ref 12.3–15.4)
GLUCOSE BLDC GLUCOMTR-MCNC: 158 MG/DL (ref 70–130)
GLUCOSE BLDC GLUCOMTR-MCNC: 78 MG/DL (ref 70–130)
GLUCOSE BLDC GLUCOMTR-MCNC: 92 MG/DL (ref 70–130)
GLUCOSE SERPL-MCNC: 132 MG/DL (ref 65–99)
HCT VFR BLD AUTO: 41.3 % (ref 37.5–51)
HGB BLD-MCNC: 13.4 G/DL (ref 13–17.7)
IMM GRANULOCYTES # BLD AUTO: 0.01 10*3/MM3 (ref 0–0.05)
IMM GRANULOCYTES NFR BLD AUTO: 0.2 % (ref 0–0.5)
LYMPHOCYTES # BLD AUTO: 1.15 10*3/MM3 (ref 0.7–3.1)
LYMPHOCYTES NFR BLD AUTO: 25.8 % (ref 19.6–45.3)
MCH RBC QN AUTO: 29.1 PG (ref 26.6–33)
MCHC RBC AUTO-ENTMCNC: 32.4 G/DL (ref 31.5–35.7)
MCV RBC AUTO: 89.8 FL (ref 79–97)
MONOCYTES # BLD AUTO: 0.39 10*3/MM3 (ref 0.1–0.9)
MONOCYTES NFR BLD AUTO: 8.8 % (ref 5–12)
MRSA DNA SPEC QL NAA+PROBE: NORMAL
NEUTROPHILS NFR BLD AUTO: 2.69 10*3/MM3 (ref 1.7–7)
NEUTROPHILS NFR BLD AUTO: 60.5 % (ref 42.7–76)
NRBC BLD AUTO-RTO: 0 /100 WBC (ref 0–0.2)
PLATELET # BLD AUTO: 156 10*3/MM3 (ref 140–450)
PMV BLD AUTO: 9.9 FL (ref 6–12)
POTASSIUM SERPL-SCNC: 4.4 MMOL/L (ref 3.5–5.2)
RBC # BLD AUTO: 4.6 10*6/MM3 (ref 4.14–5.8)
SODIUM SERPL-SCNC: 141 MMOL/L (ref 136–145)
WBC NRBC COR # BLD: 4.45 10*3/MM3 (ref 3.4–10.8)

## 2022-12-31 PROCEDURE — 99239 HOSP IP/OBS DSCHRG MGMT >30: CPT | Performed by: STUDENT IN AN ORGANIZED HEALTH CARE EDUCATION/TRAINING PROGRAM

## 2022-12-31 PROCEDURE — 25010000002 ENOXAPARIN PER 10 MG: Performed by: STUDENT IN AN ORGANIZED HEALTH CARE EDUCATION/TRAINING PROGRAM

## 2022-12-31 PROCEDURE — 85025 COMPLETE CBC W/AUTO DIFF WBC: CPT | Performed by: STUDENT IN AN ORGANIZED HEALTH CARE EDUCATION/TRAINING PROGRAM

## 2022-12-31 PROCEDURE — 25010000002 VANCOMYCIN 5 G RECONSTITUTED SOLUTION: Performed by: STUDENT IN AN ORGANIZED HEALTH CARE EDUCATION/TRAINING PROGRAM

## 2022-12-31 PROCEDURE — 82962 GLUCOSE BLOOD TEST: CPT

## 2022-12-31 PROCEDURE — 87641 MR-STAPH DNA AMP PROBE: CPT | Performed by: STUDENT IN AN ORGANIZED HEALTH CARE EDUCATION/TRAINING PROGRAM

## 2022-12-31 PROCEDURE — 80048 BASIC METABOLIC PNL TOTAL CA: CPT | Performed by: STUDENT IN AN ORGANIZED HEALTH CARE EDUCATION/TRAINING PROGRAM

## 2022-12-31 RX ORDER — AMOXICILLIN AND CLAVULANATE POTASSIUM 875; 125 MG/1; MG/1
1 TABLET, FILM COATED ORAL 2 TIMES DAILY
Qty: 10 TABLET | Refills: 0 | Status: SHIPPED | OUTPATIENT
Start: 2022-12-31 | End: 2023-01-25

## 2022-12-31 RX ORDER — CARBOXYMETHYLCELLULOSE SODIUM 5 MG/ML
2 SOLUTION/ DROPS OPHTHALMIC 3 TIMES DAILY PRN
Status: DISCONTINUED | OUTPATIENT
Start: 2022-12-31 | End: 2022-12-31 | Stop reason: HOSPADM

## 2022-12-31 RX ADMIN — OXYCODONE HYDROCHLORIDE AND ACETAMINOPHEN 1 TABLET: 5; 325 TABLET ORAL at 03:05

## 2022-12-31 RX ADMIN — VANCOMYCIN HYDROCHLORIDE 1250 MG: 500 INJECTION, POWDER, LYOPHILIZED, FOR SOLUTION INTRAVENOUS at 08:21

## 2022-12-31 RX ADMIN — LISINOPRIL 20 MG: 20 TABLET ORAL at 08:22

## 2022-12-31 RX ADMIN — CYCLOBENZAPRINE 10 MG: 10 TABLET, FILM COATED ORAL at 08:21

## 2022-12-31 RX ADMIN — Medication 10 ML: at 08:22

## 2022-12-31 RX ADMIN — NAPROXEN 500 MG: 500 TABLET ORAL at 08:21

## 2022-12-31 RX ADMIN — ENOXAPARIN SODIUM 40 MG: 100 INJECTION SUBCUTANEOUS at 08:21

## 2022-12-31 RX ADMIN — CARBOXYMETHYLCELLULOSE SODIUM 2 DROP: 5 SOLUTION/ DROPS OPHTHALMIC at 01:07

## 2022-12-31 RX ADMIN — PRAMIPEXOLE DIHYDROCHLORIDE 1 MG: 1 TABLET ORAL at 08:21

## 2022-12-31 RX ADMIN — GABAPENTIN 800 MG: 400 CAPSULE ORAL at 06:07

## 2022-12-31 RX ADMIN — PANTOPRAZOLE SODIUM 40 MG: 40 TABLET, DELAYED RELEASE ORAL at 06:07

## 2022-12-31 RX ADMIN — SPIRONOLACTONE 25 MG: 25 TABLET, FILM COATED ORAL at 08:22

## 2022-12-31 RX ADMIN — GABAPENTIN 800 MG: 400 CAPSULE ORAL at 14:39
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
## 2022-12-31 NOTE — PAYOR COMM NOTE
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 National Kidney Foundation and American Society of Nephrology (ASN) Task Force recommended calculation based on the Chronic Kidney Disease Epidemiology Collaboration (CKD-EPI) equation refit without adjustment for race.       Narrative:      GFR Normal >60  Chronic Kidney Disease <60  Kidney Failure <15      CBC Auto Differential [[id number]]  (Normal) Collected: 12/31/22 0555    Specimen: Blood Updated: 12/31/22 0632     WBC 4.45 10*3/mm3      RBC 4.60 10*6/mm3      Hemoglobin 13.4 g/dL      Hematocrit 41.3 %      MCV 89.8 fL      MCH 29.1 pg      MCHC 32.4 g/dL      RDW 13.4 %      RDW-SD 43.7 fl      MPV 9.9 fL      Platelets 156 10*3/mm3      Neutrophil % 60.5 %      Lymphocyte % 25.8 %      Monocyte % 8.8 %      Eosinophil % 4.0 %      Basophil % 0.7 %      Immature Grans % 0.2 %      Neutrophils, Absolute 2.69 10*3/mm3      Lymphocytes, Absolute 1.15 10*3/mm3      Monocytes, Absolute 0.39 10*3/mm3      Eosinophils, Absolute 0.18 10*3/mm3      Basophils, Absolute 0.03 10*3/mm3      Immature Grans, Absolute 0.01 10*3/mm3      nRBC 0.0 /100 WBC     POC Glucose Once [[id number]]  (Abnormal) Collected: 12/31/22  0602    Specimen: Blood Updated: 12/31/22 0610     Glucose 158 mg/dL      Comment: Serial Number: [id number]:  [id number]       POC Glucose Once [[id number]]  (Normal) Collected: 12/30/22 2005    Specimen: Blood Updated: 12/30/22 2012     Glucose 84 mg/dL      Comment: Serial Number: [id number]:  [id number]       POC Glucose Once [[id number]]  (Normal) Collected: 12/30/22 1614    Specimen: Blood Updated: 12/30/22 1624     Glucose 91 mg/dL      Comment: Serial Number: [id number]:  [id number]       Hemoglobin A1c [[id number]]  (Normal) Collected: 12/30/22 0933    Specimen: Blood Updated: 12/30/22 1329     Hemoglobin A1C 5.20 %     Narrative:      Hemoglobin A1C Ranges:    Increased Risk for Diabetes  5.7% to 6.4%  Diabetes                     >= 6.5%  Diabetic Goal                < 7.0%    C-reactive Protein [[id number]]  (Normal) Collected: 12/30/22 0933    Specimen: Blood Updated: 12/30/22 1022     C-Reactive Protein <0.30 mg/dL     Basic Metabolic Panel [[id number]]  (Abnormal) Collected: 12/30/22 0933    Specimen: Blood Updated: 12/30/22 1002     Glucose 95 mg/dL      BUN 15 mg/dL      Creatinine 0.82 mg/dL      Sodium 140 mmol/L      Potassium 4.5 mmol/L      Comment: Slight hemolysis detected by analyzer. Results may be affected.        Chloride 107 mmol/L      CO2 29.6 mmol/L      Calcium 9.5 mg/dL      BUN/Creatinine Ratio 18.3     Anion Gap 3.4 mmol/L      eGFR 96.3 mL/min/1.73      Comment: National Kidney Foundation and American Society of Nephrology (ASN) Task Force recommended calculation based on the Chronic Kidney Disease Epidemiology Collaboration (CKD-EPI) equation refit without adjustment for race.       Narrative:      GFR Normal >60  Chronic Kidney Disease <60  Kidney Failure <15      CBC & Differential [[id number]]  (Normal) Collected: 12/30/22 0933    Specimen: Blood Updated: 12/30/22 0939    Narrative:      The following orders were created for panel order CBC & Differential.  Procedure                                Abnormality         Status                     ---------                               -----------         ------                     CBC Auto Differential[[id number]]        Normal              Final result                 Please view results for these tests on the individual orders.    CBC Auto Differential [[id number]]  (Normal) Collected: 12/30/22 0933    Specimen: Blood Updated: 12/30/22 0939     WBC 4.65 10*3/mm3      RBC 5.00 10*6/mm3      Hemoglobin 14.3 g/dL      Hematocrit 44.2 %      MCV 88.4 fL      MCH 28.6 pg      MCHC 32.4 g/dL      RDW 13.4 %      RDW-SD 43.2 fl      MPV 10.0 fL      Platelets 149 10*3/mm3      Neutrophil % 59.4 %      Lymphocyte % 24.7 %      Monocyte % 11.8 %      Eosinophil % 3.0 %      Basophil % 0.9 %      Immature Grans % 0.2 %      Neutrophils, Absolute 2.76 10*3/mm3      Lymphocytes, Absolute 1.15 10*3/mm3      Monocytes, Absolute 0.55 10*3/mm3      Eosinophils, Absolute 0.14 10*3/mm3      Basophils, Absolute 0.04 10*3/mm3      Immature Grans, Absolute 0.01 10*3/mm3      nRBC 0.0 /100 WBC           Imaging Results (Last 48 Hours)     Procedure Component Value Units Date/Time    XR Tibia Fibula 2 View Right [[id number]] Collected: 12/30/22 0932     Updated: 12/30/22 0936    Narrative:      CLINICAL INDICATION:    worsening cellulitis eval foreign body, fx     EXAMINATION TECHNIQUE:   XR TIBIA FIBULA 2 VW RIGHT-     COMPARISON:  None.     FINDINGS:  No acute fracture or malalignment. Moderate diffuse soft tissue swelling  and subcutaneous reticulation of the entire leg. No radiodense foreign  bodies. There is a rim calcification along the right side of the mid leg  subcutaneous region, likely a benign calcification/focal fat necrosis.  Bone mineralization is within normal limits. No soft tissue gas.       Impression:      No acute osseous findings. No radiodense foreign bodies. Soft tissue  swelling, concerning for cellulitis/edema.           Images  personally reviewed, interpreted and dictated by ANNA Beth.                This report was signed and finalized on 12/30/2022 9:34 AM by ANNA Beth.          Physician Progress Notes (last 48 hours)  Notes from 12/29/22 0959 through 12/31/22 0959   No notes of this type exist for this encounter.       Consult Notes (last 48 hours)  Notes from 12/29/22 0959 through 12/31/22 0959   No notes of this type exist for this encounter.

## 2022-12-31 NOTE — PHARMACY RECOMMENDATION
"Pharmacy Consult-Vancomycin Dosing    Gonzalo Oates is a  67 y.o. male receiving vancomycin therapy.     Indication: SSTI  Consulting Provider: Dr. B. Kerley    Goal AUC: 400 to 600 (mg/L)×hr    Current Antimicrobial Therapy  Anti-Infectives (From admission, onward)      Ordered     Dose/Rate Route Frequency Start Stop    12/30/22 1243  Pharmacy to dose vancomycin        Ordering Provider: Kerley, Brian Joseph, DO     Does not apply Continuous PRN 12/30/22 1243 01/06/23 1242    12/30/22 1030  vancomycin 2000 mg/500 mL 0.9% NS IVPB ([facility])        Ordering Provider: Chaz Mcintyre MD    20 mg/kg × 96.6 kg Intravenous Once 12/30/22 1032 12/30/22 1220            Labs  Results from last 7 days   Lab Units 12/30/22  0933   WBC 10*3/mm3 4.65   CREATININE mg/dL 0.82      Estimated Creatinine Clearance: 105.8 mL/min (by C-G formula based on SCr of 0.82 mg/dL).  Temp Readings from Last 1 Encounters:   12/30/22 97.7 °F (36.5 °C) (Oral)       Microbiology Culture results  Microbiology Results (last 10 days)       Procedure Component Value - Date/Time    Wound Culture - Swab, Leg [[id number]] Collected: 12/30/22 1111    Lab Status: Preliminary result Specimen: Swab from Leg Updated: 12/30/22 1142     Gram Stain No WBCs or organisms seen            Evaluation of Dosing     Last Dose Received in the ED/Outside Facility: Yes  Is Patient on Dialysis or Renal Replacement: No    Ht - 180.3 cm (71\")  Wt - 101 kg (223 lb 8.7 oz)    Evaluation of Level                      [employer] AUC Calculation     Current dose/frequency: 1250 mg q 12 hrs     Current AUC:   602 (mg/L)×hr     Current C(trough): 19.8 mcg/mL  _________________________________     New dose/frequency: 1000 mg q 12 hrs     New AUC:   488 (mg/L)×hr     New C(trough): 15.9 mcg/mL      Assessment/Plan    Pharmacy to dose vancomycin for SSTI. Goal  to 600 (mg/L)×hr.  Adjusting vancomycin maintenance dose to 1000 mg IV q 12 hrs.  Assess clearance by vancomycin " random level on 1/1 at 0600.  Pharmacy will continue to monitor renal function, cultures and sensitivities, and clinical status to adjust regimen as necessary.      Thank you for the consult,    Zak Morris [facility], Pharm.D.  12/31/22  12:25 EST

## 2022-12-31 NOTE — CASE MANAGEMENT/SOCIAL WORK
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  DME cane  Pt states he has used [employer]  services in the past and would use them again if needed   Pt's spouse Sarah to provide transportation upon discharge home   Discharge Needs Assessment     Row Name 12/31/22 1143       Living Environment    People in Home spouse    Name(s) of People in Home Sarah Oates, spouse    Current Living Arrangements home    Duration at Residence 15 years    Primary Care Provided by self    Provides Primary Care For no one    Family Caregiver if Needed spouse    Quality of Family Relationships involved    Able to Return to Prior Arrangements yes       Resource/Environmental Concerns    Resource/Environmental Concerns none       Transition Planning    Patient/Family Anticipates Transition to home    Patient/Family Anticipated Services at Transition home health care  Possible  care   [employer] in the past    Transportation Anticipated family or friend will provide       Discharge Needs Assessment    Readmission Within the Last 30 Days no previous admission in last 30 days    Equipment Currently Used at Home cane, straight and glucometer    Concerns to be Addressed no discharge needs identified    Anticipated Changes Related to Illness none    Equipment Needed After Discharge none               Discharge Plan     Row Name 12/31/22 1200       Plan    Plan Spoke with pt about DCP Confirmed current address and phone contacts   Sarah Oates, spouse [phone]  No POA or LW  PCP Millie Madrid MD   Pt states he can perform all ADL good and only uses a cane to  ambulate  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          Demographic Summary     Row Name 12/31/22 1140       General Information    Admission Type inpatient    Arrived From emergency department    Referral Source admission list    Reason for Consult discharge planning    Preferred Language English       Contact Information    Permission Granted to Share Info With     Contact Information Obtained for                Functional Status     Row Name 12/31/22 1142       Functional Status    Usual Activity Tolerance good    Functional Status Comments Pt states he can perform all ADL good       Functional Status, IADL    Medications independent    Meal Preparation independent    Housekeeping independent    Laundry independent    Shopping independent       Mental Status Summary    Recent Changes in Mental Status/Cognitive Functioning no changes       Employment/    Employment Status disabled               Psychosocial    No documentation.                Abuse/Neglect    No documentation.                Legal    No documentation.                Substance Abuse    No documentation.                Patient Forms    No documentation.                   Jennifer Ventura RN
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
## 2022-12-31 NOTE — PLAN OF CARE
Goal Outcome Evaluation:  Plan of Care Reviewed With: patient        Progress: no change  Outcome Evaluation: No acute events overnight. Patient has rested some. VSS. Continue to monitor.

## 2022-12-31 NOTE — PLAN OF CARE
Goal Outcome Evaluation:  Plan of Care Reviewed With: patient        Progress: no change  Outcome Evaluation: VSS. No acute events. Anticipate DC home today.

## 2023-01-01 ENCOUNTER — HOSPITAL ENCOUNTER (EMERGENCY)
Facility: HOSPITAL | Age: 68
Discharge: HOME | End: 2023-01-02
Attending: EMERGENCY MEDICINE | Admitting: EMERGENCY MEDICINE
Payer: MEDICARE

## 2023-01-01 VITALS
WEIGHT: 223 LBS | OXYGEN SATURATION: 96 % | SYSTOLIC BLOOD PRESSURE: 167 MMHG | BODY MASS INDEX: 31.22 KG/M2 | TEMPERATURE: 98 F | RESPIRATION RATE: 16 BRPM | HEIGHT: 71 IN | DIASTOLIC BLOOD PRESSURE: 92 MMHG | HEART RATE: 57 BPM

## 2023-01-01 DIAGNOSIS — L03.115 CELLULITIS OF RIGHT LOWER EXTREMITY: Primary | ICD-10-CM

## 2023-01-01 LAB
BACTERIA SPEC AEROBE CULT: ABNORMAL
GRAM STN SPEC: ABNORMAL

## 2023-01-01 PROCEDURE — 99282 EMERGENCY DEPT VISIT SF MDM: CPT

## 2023-01-02 NOTE — ED PROVIDER NOTES
Subjective  History of Present Illness:    Chief Complaint:   Chief Complaint   Patient presents with   • Cellulitis      History of Present Illness: Gonzalo Oates is a 67 y.o. male who presents to the emergency department complaining of drainage from wound to right shin.  Was seen here in the ER initially on 12/28 states he thinks he got some sulfuric acid on his right shin a week prior to that and had a small blister with some surrounding cellulitis.  Was initially put on clindamycin but discharged.  Came back to the hospital 12/30 with worsening cellulitis and was admitted to the hospital.  Wound culture grew GBS was treated with Augmentin.  Has had 3 doses of Augmentin and states he usually uses a compressive cuff on his lower extremities for chronic edema.  He states tonight when he put the cuff back on for the first time since being discharged some pus drained out of the wound on his right shin.  He states overall the redness and swelling is improving significantly since it was worse at its peak.  Main reason for coming in tonight is some purulent drainage  Onset: 12/28  Duration: Improving but ongoing  Exacerbating / Alleviating factors: Worse when applying compressive cuff  Associated symptoms: No fever chills or worsening swelling      Nurses Notes reviewed and agree, including vitals, allergies, social history and prior medical history.     Review of Systems   Constitutional: Negative.    HENT: Negative.    Eyes: Negative.    Respiratory: Negative.    Cardiovascular: Negative.    Gastrointestinal: Negative.    Genitourinary: Negative.    Musculoskeletal: Negative.    Skin:        Drainage from wound to right shin   Allergic/Immunologic: Negative.    Neurological: Negative.    Psychiatric/Behavioral: Negative.    All other systems reviewed and are negative.      Past Medical History:   Diagnosis Date   • Ankle fracture     left 02/2015, Dr Bose did sx   • Anxiety    • Arthritis    • Asthma    • BPH  (benign prostatic hyperplasia)    • Cataract    • Chronic back pain    • Chronic sinusitis    • Colon polyps    • Constipation     OPIOD INDUCED   • CTS (carpal tunnel syndrome)    • Deep vein thrombosis (DVT) of iliac vein ([facility])    • Depression    • Diabetes mellitus (HCC)     PRE DIABETES NO MEDS   • Difficulty walking    • Erectile dysfunction    • GERD (gastroesophageal reflux disease)    • Headache, tension-type    • Herniated disc, cervical    • HL (hearing loss)    • Hyperlipidemia    • Hypertension    • IBS (irritable bowel syndrome)    • Left knee pain    • Lumbar herniated disc    • Memory loss    • Migraine headache    • Mitral valve prolapse    • Movement disorder    • Narcolepsy    • Obesity    • KO (obstructive sleep apnea)     non compliant with CPAP    • Peripheral vascular disease (HCC)    • Prediabetes    • Restless leg     supressed with narcotics    • Sleep apnea    • Snoring    • SOB (shortness of breath)    • Umbilical hernia    • UTI (urinary tract infection)    • Varicose veins    • Venous insufficiency    • Wears glasses        Allergies:    Adhesive tape, Calcium channel blockers, Nickel, Chlorhexidine, and Zinc oxide      Past Surgical History:   Procedure Laterality Date   • ANTERIOR LUMBAR FUSION W/ FRA  1983    L5-S1, in [place]   • APPENDECTOMY     • BACK SURGERY      hemilectomy, 1983   • CEREBRAL ANGIOGRAM     • FRACTURE SURGERY Left 2012    LEFT ANKLE, [facility], Dr Bose   • KNEE ARTHROSCOPY Left 06/27/2018    Procedure: diagnostic arthroscopy partial medial meniscectomy or other procedures as necessary, left knee;  Surgeon: Tj Hoffman MD;  Location: [facility];  Service: Orthopedics   • LUMBAR LAMINECTOMY WITH FUSION      L3-4, 2016, Dr Weber, Dr Caruso   • SINUS SURGERY     • TONSILLECTOMY     • ULNAR NERVE TRANSPOSITION      in [place] 2006   • VASCULAR SURGERY     • VASECTOMY     • WISDOM TOOTH EXTRACTION           Social History     Socioeconomic History   •  Marital status:    Tobacco Use   • Smoking status: Never   • Smokeless tobacco: Never   • Tobacco comments:     Never used.   Vaping Use   • Vaping Use: Never used   Substance and Sexual Activity   • Alcohol use: Yes     Types: 2 Shots of liquor per week     Comment: per week   • Drug use: No   • Sexual activity: Never         Family History   Problem Relation Age of Onset   • Cancer Other    • Diabetes Other    • Heart disease Other    • Hypertension Other    • Endocrine tumor Mother    • Anxiety disorder Mother    • Arthritis Mother    • Hypertension Mother    • Seizures Mother         Due to brain surgery   • Heart attack Father    • Hypertension Father    • Glaucoma Father    • Cancer Maternal Grandmother    • Heart attack Paternal Grandfather    • Hypertension Brother    • Neuropathy Brother    • Hypertension Sister    • Migraines Sister        Objective  Physical Exam:  /92 (BP Location: Left arm, Patient Position: Sitting)   Pulse 57   Temp 98 °F (36.7 °C) (Oral)   Resp 16   Ht 180.3 cm (71\")   Wt 101 kg (223 lb)   SpO2 96%   BMI 31.10 kg/m²      Physical Exam  Vitals and nursing note reviewed.   Constitutional:       General: He is not in acute distress.     Appearance: Normal appearance. He is normal weight. He is not ill-appearing, toxic-appearing or diaphoretic.   HENT:      Head: Normocephalic and atraumatic.      Nose: Nose normal.   Eyes:      Extraocular Movements: Extraocular movements intact.   Cardiovascular:      Rate and Rhythm: Normal rate and regular rhythm.      Pulses: Normal pulses.   Pulmonary:      Effort: Pulmonary effort is normal.   Abdominal:      General: Abdomen is flat.   Musculoskeletal:      Cervical back: Normal range of motion.   Skin:     Comments: There is a granulating wound to the right shin that appears to be without significant complication.  There is some surrounding cellulitis that is receding from the marker marks that were drawn a few days ago,  patient admits cellulitis and swelling seem to be improving.   Neurological:      Mental Status: He is alert.           Procedures    ED Course:         Lab Results (last 24 hours)     ** No results found for the last 24 hours. **           No radiology results from the last 24 hrs       [facility]    Gonzalo Oates is a 67 y.o. male who presents to the emergency department for evaluation of drainage from wound to right shin    Differential diagnosis includes abscess, cellulitis among other etiologies.      Chart review including any outside testing was recent discharge diagnosis, 2 past ED notes including imaging and labs    No fluctuant abscess noted, cellulitis seems to be improving based on patient's report and receding cellulitis from marker lines.  Suspect likely drainage due to the compressive nature of the cuff that he put on his leg, given the fact that he is on appropriate antibiotics based on wound culture I will feel there is any indication for incision and drainage at this time, did encourage him to watch closely and if drainage increases or redness or swelling worsens or pain worsens or he develops a fever he is to come back to the ER immediately.  Discussed with Dr. Main.  At this time we do not feel labs or imaging are indicated given overall improvement.    Plan for disposition is home with continuation of current antibiotic therapy.  Patient/family comfortable with and understanding of the plan.    Final diagnoses:   Cellulitis of right lower extremity        Cole Archer PA-C  01/01/23 9820       Cole Archer PA-C  01/01/23 0439

## 2023-01-02 NOTE — DISCHARGE INSTRUCTIONS
Please continue the antibiotics that you are on.  You may continue to use your compressive device.  As long as the drainage continues to lessen and the redness and swelling improve each day your wound should continue to heal nicely.  If any of your symptoms worsen please do not hesitate to return to the ER for repeat evaluation.
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
## 2023-01-09 ENCOUNTER — HOSPITAL ENCOUNTER (OUTPATIENT)
Dept: MRI IMAGING | Facility: HOSPITAL | Age: 68
Discharge: HOME | End: 2023-01-09
Admitting: ORTHOPAEDIC SURGERY
Payer: MEDICARE

## 2023-01-09 DIAGNOSIS — M48.02 CERVICAL STENOSIS OF SPINAL CANAL: ICD-10-CM

## 2023-01-09 PROCEDURE — 72141 MRI NECK SPINE W/O DYE: CPT

## 2023-01-25 ENCOUNTER — OFFICE VISIT (OUTPATIENT)
Dept: ORTHOPEDIC SURGERY | Facility: CLINIC | Age: 68
End: 2023-01-25
Payer: MEDICARE

## 2023-01-25 VITALS — BODY MASS INDEX: 32.62 KG/M2 | HEIGHT: 71 IN | WEIGHT: 233 LBS | TEMPERATURE: 97.3 F

## 2023-01-25 DIAGNOSIS — M17.12 PRIMARY OSTEOARTHRITIS OF LEFT KNEE: ICD-10-CM

## 2023-01-25 DIAGNOSIS — M25.562 ARTHRALGIA OF LEFT KNEE: Primary | ICD-10-CM

## 2023-01-25 PROCEDURE — 20610 DRAIN/INJ JOINT/BURSA W/O US: CPT | Performed by: ORTHOPAEDIC SURGERY

## 2023-01-25 RX ORDER — METHYLPREDNISOLONE ACETATE 40 MG/ML
40 INJECTION, SUSPENSION INTRA-ARTICULAR; INTRALESIONAL; INTRAMUSCULAR; SOFT TISSUE
Status: COMPLETED | OUTPATIENT
Start: 2023-01-25 | End: 2023-01-25

## 2023-01-25 RX ORDER — LIDOCAINE HYDROCHLORIDE 10 MG/ML
2 INJECTION, SOLUTION INFILTRATION; PERINEURAL
Status: COMPLETED | OUTPATIENT
Start: 2023-01-25 | End: 2023-01-25

## 2023-01-25 RX ORDER — MULTIPLE VITAMINS W/ MINERALS TAB 9MG-400MCG
1 TAB ORAL DAILY
COMMUNITY

## 2023-01-25 RX ADMIN — LIDOCAINE HYDROCHLORIDE 2 ML: 10 INJECTION, SOLUTION INFILTRATION; PERINEURAL at 10:27

## 2023-01-25 RX ADMIN — METHYLPREDNISOLONE ACETATE 40 MG: 40 INJECTION, SUSPENSION INTRA-ARTICULAR; INTRALESIONAL; INTRAMUSCULAR; SOFT TISSUE at 10:27

## 2023-01-25 NOTE — PROGRESS NOTES
Subjective   Gonzalo Oates is a 67 y.o. male is here today for injection therapy.  Injections of the Left Knee      CHIEF COMPLAINT:    Here for repeat injection therapy    History of Present Illness     Since last injection, patient notes moderate relief of symptoms.  No adverse effects of prior injection.  Patient requests repeat injection.     Patient tells me he  was recently hospitalized after he sustained a right lower leg chemical burn that developed cellulitis around it.  He was seen at the ER and in the hospital 2 days and treated with IV antibiotics with good improvement he was then sent home on oral antibiotics Augmentin that he completed 2 weeks ago.  His right leg now has just a small 2 cm diameter dry scab with mild circular slight erythema around the scab and no spreading of the redness nor streaks.  As he is 2 weeks from antibiotic completion and with continued improvement in healing of the smaller stable scab, we can proceed safely with contralateral left knee injection therapy today.    Past Medical History:   Diagnosis Date   • Ankle fracture     left 02/2015, Dr Bose did sx   • Anxiety    • Arthritis    • Arthritis of back    • Arthritis of neck    • Asthma    • BPH (benign prostatic hyperplasia)    • Cataract    • Cellulitis of right lower extremity 12/21/2022   • Chemical burn of right lower leg 12/21/2022   • Chronic back pain    • Chronic sinusitis    • Colon polyps    • Constipation     OPIOD INDUCED   • CTS (carpal tunnel syndrome)    • Deep vein thrombosis (DVT) of iliac vein (HCC)    • Depression    • Diabetes mellitus (HCC)     PRE DIABETES NO MEDS   • Difficulty walking    • Erectile dysfunction    • GERD (gastroesophageal reflux disease)    • Headache, tension-type    • Herniated disc, cervical    • HL (hearing loss)    • Hyperlipidemia    • Hypertension    • IBS (irritable bowel syndrome)    • Knee swelling    • Left knee pain    • Lumbar herniated disc    • Memory loss    •  Migraine headache    • Mitral valve prolapse    • Movement disorder    • Narcolepsy    • Neuroma of foot    • Obesity    • KO (obstructive sleep apnea)     non compliant with CPAP    • Periarthritis of shoulder    • Peripheral vascular disease (HCC)    • Prediabetes    • Restless leg     supressed with narcotics    • Sleep apnea    • Snoring    • SOB (shortness of breath)    • Tennis elbow    • Umbilical hernia    • UTI (urinary tract infection)    • Varicose veins    • Venous insufficiency    • Wears glasses         Past Surgical History:   Procedure Laterality Date   • ANKLE OPEN REDUCTION INTERNAL FIXATION     • ANTERIOR LUMBAR FUSION W/ FRA  1983    L5-S1, in [place]   • APPENDECTOMY     • BACK SURGERY      hemilectomy, 1983   • CEREBRAL ANGIOGRAM     • FRACTURE SURGERY Left 2012    LEFT ANKLE, [facility], Dr Bose   • KNEE ARTHROSCOPY Left 06/27/2018    Procedure: diagnostic arthroscopy partial medial meniscectomy or other procedures as necessary, left knee;  Surgeon: Tj Hoffman MD;  Location: [facility];  Service: Orthopedics   • KNEE SURGERY     • LUMBAR LAMINECTOMY WITH FUSION      L3-4, 2016, Dr Weber, Dr Caruso   • SINUS SURGERY     • TONSILLECTOMY     • TRIGGER POINT INJECTION     • ULNAR NERVE TRANSPOSITION      in [place] 2006   • VASCULAR SURGERY     • VASECTOMY     • WISDOM TOOTH EXTRACTION         Allergies   Allergen Reactions   • Adhesive Tape Rash and Itching   • Calcium Channel Blockers Swelling     Lower extremity edema       • Nickel Swelling and Itching   • Chlorhexidine Rash   • Zinc Oxide Rash and Unknown (See Comments)     SENSITIVITY ,CAUSES RASH         Review of Systems   Constitutional: Negative for fever.   Musculoskeletal: Positive for arthralgias. Negative for gait problem and joint swelling.   Skin: Negative for color change and rash.   Neurological: Negative for weakness.   Psychiatric/Behavioral: Negative for confusion.     I have reviewed the medical and surgical  "history, family history, social history, medications, and/or allergies, and the review of systems of this report.    Objective   Temp 97.3 °F (36.3 °C)   Ht 180.3 cm (71\")   Wt 106 kg (233 lb)   BMI 32.50 kg/m²   Physical Exam  Skin exam stable with no erythema, ecchymosis or rash.  No new swelling.  No motor or sensory changes.  Distal pulse intact.  Ortho Exam  Neurologic Exam    Large Joint Arthrocentesis: L knee  Date/Time: 1/25/2023 10:27 AM  Consent given by: patient  Site marked: site marked  Timeout: Immediately prior to procedure a time out was called to verify the correct patient, procedure, equipment, support staff and site/side marked as required   Supporting Documentation  Indications: pain   Procedure Details  Location: knee - L knee  Preparation: Patient was prepped and draped in the usual sterile fashion  Needle size: 22 G  Approach: anteromedial  Medications administered: 40 mg methylPREDNISolone acetate 40 MG/ML; 2 mL lidocaine 1 %  Patient tolerance: patient tolerated the procedure well with no immediate complications          Assessment & Plan     Independent Review of Radiographic Studies:    No new imaging done today.    Laboratory and Other Studies:  No new results reviewed today.     Medical Decision Making:    No complications of procedure and treatments.  Fair progress with residual symptoms.   Continue care plan with any additional work-up and treatment as outlined in recommendations.    Diagnoses and all orders for this visit:    1. Arthralgia of left knee (Primary)  -     Large Joint Arthrocentesis: L knee    2. Primary osteoarthritis of left knee  -     Large Joint Arthrocentesis: L knee    Other orders  -     Cancel: Large Joint Arthrocentesis: L knee      Discussion of orthopaedic goals and activities and patient expressed appreciation.  Guided on proper techniques for mobility, strength, agility and/or conditioning exercises  Ice, heat, and/or modalities as beneficial  Watch for " signs and symptoms of infection  Call or notify for any adverse effect from injection therapy    Recommendations/Plan:  Brace: No brace was given at today's visit.  Referral: No referrals made at today's visit.  Test/Studies: No additional studies ordered at this time.  Work/Activity Status: Usual activities, routine exercise as tolerated, light physical work as tolerated, no strenuous activity.    Return in about 3 months (around 4/25/2023) for Recheck.  Patient is encouraged and agreeable to call or return sooner for any issues or concerns.

## 2023-01-25 NOTE — PROGRESS NOTES
Subjective   Gonzalo Oates is a 67 y.o. male is here today for injection therapy.  Injections of the Left Knee        CHIEF COMPLAINT:    Here for repeat injection therapy    History of Present Illness     Since last injection, patient notes substantial relief of symptoms.  No adverse effects of prior injection.  Patient requests repeat injection.    Past Medical History:   Diagnosis Date   • Ankle fracture     left 02/2015, Dr Bose did sx   • Anxiety    • Arthritis    • Arthritis of back    • Arthritis of neck    • Asthma    • BPH (benign prostatic hyperplasia)    • Cataract    • Chronic back pain    • Chronic sinusitis    • Colon polyps    • Constipation     OPIOD INDUCED   • CTS (carpal tunnel syndrome)    • Deep vein thrombosis (DVT) of iliac vein (HCC)    • Depression    • Diabetes mellitus (HCC)     PRE DIABETES NO MEDS   • Difficulty walking    • Erectile dysfunction    • GERD (gastroesophageal reflux disease)    • Headache, tension-type    • Herniated disc, cervical    • HL (hearing loss)    • Hyperlipidemia    • Hypertension    • IBS (irritable bowel syndrome)    • Knee swelling    • Left knee pain    • Lumbar herniated disc    • Memory loss    • Migraine headache    • Mitral valve prolapse    • Movement disorder    • Narcolepsy    • Neuroma of foot    • Obesity    • KO (obstructive sleep apnea)     non compliant with CPAP    • Periarthritis of shoulder    • Peripheral vascular disease (HCC)    • Prediabetes    • Restless leg     supressed with narcotics    • Sleep apnea    • Snoring    • SOB (shortness of breath)    • Tennis elbow    • Umbilical hernia    • UTI (urinary tract infection)    • Varicose veins    • Venous insufficiency    • Wears glasses         Past Surgical History:   Procedure Laterality Date   • ANKLE OPEN REDUCTION INTERNAL FIXATION     • ANTERIOR LUMBAR FUSION W/ FRA  1983    L5-S1, in [place]   • APPENDECTOMY     • BACK SURGERY      hemilectomy, 1983   • CEREBRAL ANGIOGRAM     •  "FRACTURE SURGERY Left 2012    LEFT ANKLE, [facility], Dr Bose   • KNEE ARTHROSCOPY Left 06/27/2018    Procedure: diagnostic arthroscopy partial medial meniscectomy or other procedures as necessary, left knee;  Surgeon: Tj Hoffman MD;  Location: [facility];  Service: Orthopedics   • KNEE SURGERY     • LUMBAR LAMINECTOMY WITH FUSION      L3-4, 2016, Dr Weber, Dr Caruso   • SINUS SURGERY     • TONSILLECTOMY     • TRIGGER POINT INJECTION     • ULNAR NERVE TRANSPOSITION      in [place] 2006   • VASCULAR SURGERY     • VASECTOMY     • WISDOM TOOTH EXTRACTION         Allergies   Allergen Reactions   • Adhesive Tape Rash and Itching   • Calcium Channel Blockers Swelling     Lower extremity edema       • Nickel Swelling and Itching   • Chlorhexidine Rash   • Zinc Oxide Rash and Unknown (See Comments)     SENSITIVITY ,CAUSES RASH         Review of Systems   Constitutional: Negative for fever.   Musculoskeletal: Positive for arthralgias, gait problem (uses cane) and joint swelling.   Skin:        Recently treated for right lower extremity cellulitis     {Hx Review:24733::\"I have reviewed the medical and surgical history, family history, social history, medications, and/or allergies, and the review of systems of this report.\"}    Objective   Temp 97.3 °F (36.3 °C)   Ht 180.3 cm (71\")   Wt 106 kg (233 lb)   BMI 32.50 kg/m²   Physical Exam  {16TC Visco PE:42246::\"Skin exam stable with no erythema, ecchymosis or rash.\",\"No new swelling.\",\"No motor or sensory changes.\",\"Distal pulse intact.\"}  Ortho Exam  Neurologic Exam    Large Joint Arthrocentesis: L knee  Date/Time: 1/25/2023 10:11 AM  Consent given by: patient  Site marked: site marked  Timeout: Immediately prior to procedure a time out was called to verify the correct patient, procedure, equipment, support staff and site/side marked as required   Supporting Documentation  Indications: pain   Procedure Details  Location: knee - L knee  Preparation: Patient was " prepped and draped in the usual sterile fashion  Needle size: 22 G  Medications administered: 40 mg methylPREDNISolone acetate 40 MG/ML; 2 mL lidocaine 1 %  Patient tolerance: patient tolerated the procedure well with no immediate complications          Assessment & Plan     Independent Review of Radiographic Studies:    No new imaging done today.    Laboratory and Other Studies:  No new results reviewed today.     Medical Decision Making:    No complications of procedure and treatments.  Good progress, significantly improved.  Continue current treatment plan.    There are no diagnoses linked to this encounter.    Discussion of orthopaedic goals and activities and patient expressed appreciation.  Guided on proper techniques for mobility, strength, agility and/or conditioning exercises  Ice, heat, and/or modalities as beneficial  Watch for signs and symptoms of infection  Call or notify for any adverse effect from injection therapy    Recommendations/Plan:  Brace: No brace was given at today's visit.  Referral: No referrals made at today's visit.  Test/Studies: No additional studies ordered at this time.  Surgery: No surgery proposed at this visit.  Work/Activity Status: Usual activities, routine exercise as tolerated, light physical work as tolerated, no strenuous activity.    No follow-ups on file.  Patient is encouraged and agreeable to call or return sooner for any issues or concerns.

## 2023-01-30 ENCOUNTER — TRANSCRIBE ORDERS (OUTPATIENT)
Dept: ADMINISTRATIVE | Facility: HOSPITAL | Age: 68
End: 2023-01-30
Payer: MEDICARE

## 2023-01-30 DIAGNOSIS — M54.16 LEFT LUMBAR RADICULOPATHY: Primary | ICD-10-CM

## 2023-02-09 ENCOUNTER — HOSPITAL ENCOUNTER (OUTPATIENT)
Dept: MRI IMAGING | Facility: HOSPITAL | Age: 68
Discharge: HOME | End: 2023-02-09
Admitting: ORTHOPAEDIC SURGERY
Payer: OTHER MISCELLANEOUS

## 2023-02-09 DIAGNOSIS — M54.16 LEFT LUMBAR RADICULOPATHY: ICD-10-CM

## 2023-02-09 PROCEDURE — 72148 MRI LUMBAR SPINE W/O DYE: CPT

## 2023-04-06 ENCOUNTER — HOSPITAL ENCOUNTER (EMERGENCY)
Facility: HOSPITAL | Age: 68
Discharge: HOME | End: 2023-04-06
Attending: EMERGENCY MEDICINE | Admitting: EMERGENCY MEDICINE
Payer: MEDICARE

## 2023-04-06 ENCOUNTER — APPOINTMENT (OUTPATIENT)
Dept: GENERAL RADIOLOGY | Facility: HOSPITAL | Age: 68
End: 2023-04-06
Payer: MEDICARE

## 2023-04-06 VITALS
WEIGHT: 133 LBS | HEART RATE: 55 BPM | HEIGHT: 71 IN | DIASTOLIC BLOOD PRESSURE: 83 MMHG | RESPIRATION RATE: 17 BRPM | SYSTOLIC BLOOD PRESSURE: 150 MMHG | TEMPERATURE: 97.8 F | BODY MASS INDEX: 18.62 KG/M2 | OXYGEN SATURATION: 98 %

## 2023-04-06 DIAGNOSIS — S62.639A CLOSED AVULSION FRACTURE OF DISTAL PHALANX OF FINGER, INITIAL ENCOUNTER: Primary | ICD-10-CM

## 2023-04-06 DIAGNOSIS — T14.8XXA SKIN ABRASION: ICD-10-CM

## 2023-04-06 PROCEDURE — 73140 X-RAY EXAM OF FINGER(S): CPT

## 2023-04-06 PROCEDURE — 99284 EMERGENCY DEPT VISIT MOD MDM: CPT

## 2023-04-06 RX ORDER — HYDROCODONE BITARTRATE AND ACETAMINOPHEN 5; 325 MG/1; MG/1
1 TABLET ORAL ONCE
Status: COMPLETED | OUTPATIENT
Start: 2023-04-06 | End: 2023-04-06

## 2023-04-06 RX ORDER — NALOXONE HYDROCHLORIDE 4 MG/.1ML
SPRAY NASAL
Qty: 2 EACH | Refills: 0 | Status: SHIPPED | OUTPATIENT
Start: 2023-04-06

## 2023-04-06 RX ORDER — HYDROCODONE BITARTRATE AND ACETAMINOPHEN 5; 325 MG/1; MG/1
1 TABLET ORAL EVERY 6 HOURS PRN
Qty: 10 TABLET | Refills: 0 | Status: SHIPPED | OUTPATIENT
Start: 2023-04-06

## 2023-04-06 RX ADMIN — HYDROCODONE BITARTRATE AND ACETAMINOPHEN 1 TABLET: 5; 325 TABLET ORAL at 13:15

## 2023-04-06 NOTE — ED PROVIDER NOTES
Subjective  History of Present Illness:    Chief Complaint: Right index finger injury  History of Present Illness: Right index finger injury while working on a washer and with a drill, the finger got caught causing abrasions to the tip of the finger on the palm surface  Onset: Sudden onset  Duration: Just prior to arrival  Exacerbating / Alleviating factors: Pain with movement of the finger  Associated symptoms: Skin abrasions on the palm surface      Nurses Notes reviewed and agree, including vitals, allergies, social history and prior medical history.     REVIEW OF SYSTEMS: All systems reviewed and not pertinent unless noted.    Review of Systems   Musculoskeletal:        Right index finger injury   All other systems reviewed and are negative.      Past Medical History:   Diagnosis Date   • Ankle fracture     left 02/2015, Dr Bose did sx   • Anxiety    • Arthritis    • Arthritis of back    • Arthritis of neck    • Asthma    • BPH (benign prostatic hyperplasia)    • Cataract    • Cellulitis of right lower extremity 12/21/2022   • Chemical burn of right lower leg 12/21/2022   • Chronic back pain    • Chronic sinusitis    • Colon polyps    • Constipation     OPIOD INDUCED   • CTS (carpal tunnel syndrome)    • Deep vein thrombosis (DVT) of iliac vein    • Depression    • Diabetes mellitus     PRE DIABETES NO MEDS   • Difficulty walking    • Erectile dysfunction    • GERD (gastroesophageal reflux disease)    • Headache, tension-type    • Herniated disc, cervical    • HL (hearing loss)    • Hyperlipidemia    • Hypertension    • IBS (irritable bowel syndrome)    • Knee swelling    • Left knee pain    • Lumbar herniated disc    • Memory loss    • Migraine headache    • Mitral valve prolapse    • Movement disorder    • Narcolepsy    • Neuroma of foot    • Obesity    • KO (obstructive sleep apnea)     non compliant with CPAP    • Periarthritis of shoulder    • Peripheral vascular disease    • Prediabetes    • Restless  leg     supressed with narcotics    • Sleep apnea    • Snoring    • SOB (shortness of breath)    • Tennis elbow    • Umbilical hernia    • UTI (urinary tract infection)    • Varicose veins    • Venous insufficiency    • Wears glasses        Allergies:    Adhesive tape, Calcium channel blockers, Nickel, Chlorhexidine, and Zinc oxide      Past Surgical History:   Procedure Laterality Date   • ANKLE OPEN REDUCTION INTERNAL FIXATION     • ANTERIOR LUMBAR FUSION W/ FRA  1983    L5-S1, in [place]   • APPENDECTOMY     • BACK SURGERY      hemilectomy, 1983   • CEREBRAL ANGIOGRAM     • FRACTURE SURGERY Left 2012    LEFT ANKLE, [facility], Dr Bose   • KNEE ARTHROSCOPY Left 06/27/2018    Procedure: diagnostic arthroscopy partial medial meniscectomy or other procedures as necessary, left knee;  Surgeon: Tj Hoffman MD;  Location: [facility];  Service: Orthopedics   • KNEE SURGERY     • LUMBAR LAMINECTOMY WITH FUSION      L3-4, 2016, Dr Weber, Dr Caruso   • SINUS SURGERY     • TONSILLECTOMY     • TRIGGER POINT INJECTION     • ULNAR NERVE TRANSPOSITION      in [place] 2006   • VASCULAR SURGERY     • VASECTOMY     • WISDOM TOOTH EXTRACTION           Social History     Socioeconomic History   • Marital status:    Tobacco Use   • Smoking status: Never   • Smokeless tobacco: Never   • Tobacco comments:     Never used.   Vaping Use   • Vaping Use: Never used   Substance and Sexual Activity   • Alcohol use: Yes     Types: 2 Shots of liquor per week     Comment: per week   • Drug use: No   • Sexual activity: Never         Family History   Problem Relation Age of Onset   • Cancer Other    • Diabetes Other    • Heart disease Other    • Hypertension Other    • Endocrine tumor Mother    • Anxiety disorder Mother    • Arthritis Mother    • Hypertension Mother    • Seizures Mother         Due to brain surgery   • Heart attack Father    • Hypertension Father    • Glaucoma Father    • Cancer Maternal Grandmother    • Heart  "attack Paternal Grandfather    • Hypertension Brother    • Neuropathy Brother    • Hypertension Sister    • Migraines Sister        Objective  Physical Exam:  /83 (BP Location: Left arm, Patient Position: Sitting)   Pulse 55   Temp 97.8 °F (36.6 °C) (Oral)   Resp 17   Ht 180.3 cm (71\")   Wt 60.3 kg (133 lb)   SpO2 98%   BMI 18.55 kg/m²      Physical Exam  Vitals and nursing note reviewed.   Constitutional:       Appearance: He is well-developed.   HENT:      Head: Normocephalic and atraumatic.      Mouth/Throat:      Mouth: Mucous membranes are moist.   Pulmonary:      Effort: Pulmonary effort is normal.   Musculoskeletal:         General: Tenderness present.        Hands:       Cervical back: Normal range of motion.      Comments: Skin abrasions   Skin:     General: Skin is warm and dry.   Neurological:      Mental Status: He is alert and oriented to person, place, and time.           Procedures    ED Course:         Lab Results (last 24 hours)     ** No results found for the last 24 hours. **           XR Finger 2+ View Right    Result Date: 4/6/2023  PROCEDURE: XR FINGER 2+ VW RIGHT-  History: Right index finger crush injury  COMPARISON: None.  FINDINGS:  A 3 view exam demonstrates a small avulsion fracture of the base of the right second distal phalanx. This is best seen on the oblique view. There is associated soft tissue swelling. There are degenerative changes of the right second DIP joint.      Impression: Acute fracture as above.       Images were reviewed, interpreted, and dictated by Dr. Nyasia Yoo MD Transcribed by Patito Sharpe PA-C.  This report was signed and finalized on 4/6/2023 12:39 PM by Nyasia Yoo MD.         Medical Decision Making  68-year-old male with finger abrasion, and finger injury, differential would include skin abrasions, laceration, finger contusion, finger fracture.  X-rays were performed that showed avulsion fracture of the distal phalanx.  Abrasions were cleaned " and bandaged, patient was put in a splint, he will be discharged home with recommendation of follow-up with primary care return to the ED if worse.    Closed avulsion fracture of distal phalanx of finger, initial encounter: acute illness or injury  Skin abrasion: acute illness or injury  Amount and/or Complexity of Data Reviewed  Radiology: ordered.            Final diagnoses:   Closed avulsion fracture of distal phalanx of finger, initial encounter   Skin abrasion        Pj Hurtado Jr., PA-C  04/06/23 1258

## 2023-05-08 ENCOUNTER — OFFICE VISIT (OUTPATIENT)
Dept: ORTHOPEDIC SURGERY | Facility: CLINIC | Age: 68
End: 2023-05-08
Payer: MEDICARE

## 2023-05-08 VITALS
HEIGHT: 71 IN | SYSTOLIC BLOOD PRESSURE: 130 MMHG | BODY MASS INDEX: 33.85 KG/M2 | DIASTOLIC BLOOD PRESSURE: 70 MMHG | TEMPERATURE: 98.2 F | WEIGHT: 241.8 LBS

## 2023-05-08 DIAGNOSIS — M76.891 TENDINITIS OF RIGHT QUADRICEPS TENDON: ICD-10-CM

## 2023-05-08 DIAGNOSIS — M25.562 ARTHRALGIA OF BOTH KNEES: Primary | ICD-10-CM

## 2023-05-08 DIAGNOSIS — M25.561 ARTHRALGIA OF BOTH KNEES: Primary | ICD-10-CM

## 2023-05-08 DIAGNOSIS — S62.660A CLOSED NONDISPLACED FRACTURE OF DISTAL PHALANX OF RIGHT INDEX FINGER, INITIAL ENCOUNTER: ICD-10-CM

## 2023-05-08 DIAGNOSIS — M17.12 PRIMARY OSTEOARTHRITIS OF LEFT KNEE: ICD-10-CM

## 2023-05-08 RX ORDER — ESCITALOPRAM OXALATE 20 MG/1
20 TABLET ORAL DAILY
COMMUNITY

## 2023-05-08 RX ADMIN — METHYLPREDNISOLONE ACETATE 40 MG: 40 INJECTION, SUSPENSION INTRA-ARTICULAR; INTRALESIONAL; INTRAMUSCULAR; SOFT TISSUE at 09:07

## 2023-05-08 RX ADMIN — LIDOCAINE HYDROCHLORIDE 2 ML: 10 INJECTION, SOLUTION INFILTRATION; PERINEURAL at 09:07
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
## 2023-05-15 RX ORDER — LIDOCAINE HYDROCHLORIDE 10 MG/ML
2 INJECTION, SOLUTION INFILTRATION; PERINEURAL
Status: COMPLETED | OUTPATIENT
Start: 2023-05-08 | End: 2023-05-08

## 2023-05-15 RX ORDER — METHYLPREDNISOLONE ACETATE 40 MG/ML
40 INJECTION, SUSPENSION INTRA-ARTICULAR; INTRALESIONAL; INTRAMUSCULAR; SOFT TISSUE
Status: COMPLETED | OUTPATIENT
Start: 2023-05-08 | End: 2023-05-08

## 2023-08-17 ENCOUNTER — TRANSCRIBE ORDERS (OUTPATIENT)
Dept: LAB | Facility: HOSPITAL | Age: 68
End: 2023-08-17
Payer: MEDICARE

## 2023-08-17 ENCOUNTER — HOSPITAL ENCOUNTER (EMERGENCY)
Facility: HOSPITAL | Age: 68
Discharge: HOME | End: 2023-08-17
Attending: EMERGENCY MEDICINE
Payer: MEDICARE

## 2023-08-17 ENCOUNTER — LAB (OUTPATIENT)
Dept: LAB | Facility: HOSPITAL | Age: 68
End: 2023-08-17
Payer: MEDICARE

## 2023-08-17 VITALS
HEIGHT: 71 IN | HEART RATE: 66 BPM | DIASTOLIC BLOOD PRESSURE: 93 MMHG | SYSTOLIC BLOOD PRESSURE: 153 MMHG | BODY MASS INDEX: 35.56 KG/M2 | WEIGHT: 254 LBS | TEMPERATURE: 98 F | RESPIRATION RATE: 20 BRPM | OXYGEN SATURATION: 97 %

## 2023-08-17 DIAGNOSIS — R53.83 TIREDNESS: ICD-10-CM

## 2023-08-17 DIAGNOSIS — E53.8 BIOTIN-(PROPIONYL-COA-CARBOXYLASE) LIGASE DEFICIENCY: ICD-10-CM

## 2023-08-17 DIAGNOSIS — E11.9 DIABETES MELLITUS WITHOUT COMPLICATION: ICD-10-CM

## 2023-08-17 DIAGNOSIS — E78.2 MIXED HYPERLIPIDEMIA: ICD-10-CM

## 2023-08-17 DIAGNOSIS — E55.9 AVITAMINOSIS D: ICD-10-CM

## 2023-08-17 DIAGNOSIS — D50.0 IRON DEFICIENCY ANEMIA SECONDARY TO BLOOD LOSS (CHRONIC): ICD-10-CM

## 2023-08-17 DIAGNOSIS — Z12.5 SPECIAL SCREENING FOR MALIGNANT NEOPLASM OF PROSTATE: ICD-10-CM

## 2023-08-17 DIAGNOSIS — I10 ESSENTIAL HYPERTENSION, MALIGNANT: ICD-10-CM

## 2023-08-17 DIAGNOSIS — T63.461A WASP STING, ACCIDENTAL OR UNINTENTIONAL, INITIAL ENCOUNTER: Primary | ICD-10-CM

## 2023-08-17 DIAGNOSIS — I10 ESSENTIAL HYPERTENSION, MALIGNANT: Primary | ICD-10-CM

## 2023-08-17 LAB
25(OH)D3 SERPL-MCNC: 23.8 NG/ML (ref 30–100)
ALBUMIN SERPL-MCNC: 4.2 G/DL (ref 3.5–5.2)
ALBUMIN UR-MCNC: 6.3 MG/DL
ALBUMIN/GLOB SERPL: 1.9 G/DL
ALP SERPL-CCNC: 85 U/L (ref 39–117)
ALT SERPL W P-5'-P-CCNC: 24 U/L (ref 1–41)
ANION GAP SERPL CALCULATED.3IONS-SCNC: 11.1 MMOL/L (ref 5–15)
AST SERPL-CCNC: 27 U/L (ref 1–40)
BILIRUB SERPL-MCNC: 0.4 MG/DL (ref 0–1.2)
BUN SERPL-MCNC: 17 MG/DL (ref 8–23)
BUN/CREAT SERPL: 19.1 (ref 7–25)
CALCIUM SPEC-SCNC: 9.6 MG/DL (ref 8.6–10.5)
CHLORIDE SERPL-SCNC: 107 MMOL/L (ref 98–107)
CHOLEST SERPL-MCNC: 165 MG/DL (ref 0–200)
CO2 SERPL-SCNC: 24.9 MMOL/L (ref 22–29)
CREAT SERPL-MCNC: 0.89 MG/DL (ref 0.76–1.27)
DEPRECATED RDW RBC AUTO: 40.2 FL (ref 37–54)
EGFRCR SERPLBLD CKD-EPI 2021: 93.3 ML/MIN/1.73
ERYTHROCYTE [DISTWIDTH] IN BLOOD BY AUTOMATED COUNT: 13.1 % (ref 12.3–15.4)
GLOBULIN UR ELPH-MCNC: 2.2 GM/DL
GLUCOSE SERPL-MCNC: 131 MG/DL (ref 65–99)
HBA1C MFR BLD: 6.1 % (ref 4.8–5.6)
HCT VFR BLD AUTO: 43.4 % (ref 37.5–51)
HDLC SERPL-MCNC: 53 MG/DL (ref 40–60)
HGB BLD-MCNC: 14.5 G/DL (ref 13–17.7)
IRON 24H UR-MRATE: 51 MCG/DL (ref 59–158)
IRON SATN MFR SERPL: 9 % (ref 20–50)
LDLC SERPL CALC-MCNC: 106 MG/DL (ref 0–100)
LDLC/HDLC SERPL: 2.01 {RATIO}
MCH RBC QN AUTO: 28.4 PG (ref 26.6–33)
MCHC RBC AUTO-ENTMCNC: 33.4 G/DL (ref 31.5–35.7)
MCV RBC AUTO: 85.1 FL (ref 79–97)
PLATELET # BLD AUTO: 179 10*3/MM3 (ref 140–450)
PMV BLD AUTO: 10.9 FL (ref 6–12)
POTASSIUM SERPL-SCNC: 4.2 MMOL/L (ref 3.5–5.2)
PROT SERPL-MCNC: 6.4 G/DL (ref 6–8.5)
PSA SERPL-MCNC: 0.84 NG/ML (ref 0–4)
RBC # BLD AUTO: 5.1 10*6/MM3 (ref 4.14–5.8)
SODIUM SERPL-SCNC: 143 MMOL/L (ref 136–145)
T4 FREE SERPL-MCNC: 1.05 NG/DL (ref 0.93–1.7)
TIBC SERPL-MCNC: 562 MCG/DL (ref 298–536)
TRANSFERRIN SERPL-MCNC: 377 MG/DL (ref 200–360)
TRIGL SERPL-MCNC: 27 MG/DL (ref 0–150)
TSH SERPL DL<=0.05 MIU/L-ACNC: 0.63 UIU/ML (ref 0.27–4.2)
VIT B12 BLD-MCNC: 470 PG/ML (ref 211–946)
VLDLC SERPL-MCNC: 6 MG/DL (ref 5–40)
WBC NRBC COR # BLD: 5.09 10*3/MM3 (ref 3.4–10.8)

## 2023-08-17 PROCEDURE — 82306 VITAMIN D 25 HYDROXY: CPT

## 2023-08-17 PROCEDURE — 80053 COMPREHEN METABOLIC PANEL: CPT

## 2023-08-17 PROCEDURE — 36415 COLL VENOUS BLD VENIPUNCTURE: CPT

## 2023-08-17 PROCEDURE — 84439 ASSAY OF FREE THYROXINE: CPT

## 2023-08-17 PROCEDURE — G0103 PSA SCREENING: HCPCS

## 2023-08-17 PROCEDURE — 83036 HEMOGLOBIN GLYCOSYLATED A1C: CPT

## 2023-08-17 PROCEDURE — 84443 ASSAY THYROID STIM HORMONE: CPT

## 2023-08-17 PROCEDURE — 99283 EMERGENCY DEPT VISIT LOW MDM: CPT

## 2023-08-17 PROCEDURE — 85027 COMPLETE CBC AUTOMATED: CPT

## 2023-08-17 PROCEDURE — 82607 VITAMIN B-12: CPT

## 2023-08-17 PROCEDURE — 80061 LIPID PANEL: CPT

## 2023-08-17 PROCEDURE — 63710000001 PREDNISONE PER 1 MG: Performed by: PHYSICIAN ASSISTANT

## 2023-08-17 PROCEDURE — 82043 UR ALBUMIN QUANTITATIVE: CPT

## 2023-08-17 PROCEDURE — 83540 ASSAY OF IRON: CPT

## 2023-08-17 PROCEDURE — 84466 ASSAY OF TRANSFERRIN: CPT

## 2023-08-17 RX ORDER — FAMOTIDINE 20 MG/1
20 TABLET, FILM COATED ORAL 2 TIMES DAILY
Qty: 10 TABLET | Refills: 0 | Status: SHIPPED | OUTPATIENT
Start: 2023-08-17 | End: 2023-08-22

## 2023-08-17 RX ORDER — PREDNISONE 20 MG/1
40 TABLET ORAL ONCE
Status: COMPLETED | OUTPATIENT
Start: 2023-08-17 | End: 2023-08-17

## 2023-08-17 RX ORDER — PREDNISONE 20 MG/1
40 TABLET ORAL DAILY
Qty: 10 TABLET | Refills: 0 | Status: SHIPPED | OUTPATIENT
Start: 2023-08-17 | End: 2023-08-22

## 2023-08-17 RX ORDER — FAMOTIDINE 20 MG/1
20 TABLET, FILM COATED ORAL ONCE
Status: COMPLETED | OUTPATIENT
Start: 2023-08-17 | End: 2023-08-17

## 2023-08-17 RX ADMIN — PREDNISONE 40 MG: 20 TABLET ORAL at 01:14

## 2023-08-17 RX ADMIN — FAMOTIDINE 20 MG: 20 TABLET, FILM COATED ORAL at 01:14
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
## 2023-08-19 ENCOUNTER — APPOINTMENT (OUTPATIENT)
Dept: GENERAL RADIOLOGY | Facility: HOSPITAL | Age: 68
End: 2023-08-19
Payer: MEDICARE

## 2023-08-19 ENCOUNTER — HOSPITAL ENCOUNTER (EMERGENCY)
Facility: HOSPITAL | Age: 68
Discharge: HOME | End: 2023-08-19
Attending: EMERGENCY MEDICINE
Payer: MEDICARE

## 2023-08-19 VITALS
OXYGEN SATURATION: 98 % | DIASTOLIC BLOOD PRESSURE: 83 MMHG | TEMPERATURE: 97.5 F | RESPIRATION RATE: 18 BRPM | HEIGHT: 71 IN | BODY MASS INDEX: 35.56 KG/M2 | WEIGHT: 254 LBS | HEART RATE: 55 BPM | SYSTOLIC BLOOD PRESSURE: 147 MMHG

## 2023-08-19 DIAGNOSIS — S76.212A STRAIN OF LEFT GROIN: Primary | ICD-10-CM

## 2023-08-19 PROCEDURE — 73502 X-RAY EXAM HIP UNI 2-3 VIEWS: CPT

## 2023-08-19 PROCEDURE — 99282 EMERGENCY DEPT VISIT SF MDM: CPT

## 2023-08-19 RX ORDER — BACLOFEN 10 MG/1
10 TABLET ORAL 3 TIMES DAILY
Qty: 20 TABLET | Refills: 0 | Status: SHIPPED | OUTPATIENT
Start: 2023-08-19

## 2023-08-19 RX ORDER — HYDROCODONE BITARTRATE AND ACETAMINOPHEN 5; 325 MG/1; MG/1
1 TABLET ORAL ONCE
Status: DISCONTINUED | OUTPATIENT
Start: 2023-08-19 | End: 2023-08-19
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
## 2023-08-24 ENCOUNTER — APPOINTMENT (OUTPATIENT)
Dept: GENERAL RADIOLOGY | Facility: HOSPITAL | Age: 68
End: 2023-08-24
Payer: MEDICARE

## 2023-08-24 ENCOUNTER — HOSPITAL ENCOUNTER (EMERGENCY)
Facility: HOSPITAL | Age: 68
Discharge: HOME | End: 2023-08-25
Payer: MEDICARE

## 2023-08-24 ENCOUNTER — APPOINTMENT (OUTPATIENT)
Dept: ULTRASOUND IMAGING | Facility: HOSPITAL | Age: 68
End: 2023-08-24
Payer: MEDICARE

## 2023-08-24 DIAGNOSIS — N43.3 HYDROCELE IN ADULT: ICD-10-CM

## 2023-08-24 DIAGNOSIS — M79.662 PAIN AND SWELLING OF LEFT LOWER LEG: Primary | ICD-10-CM

## 2023-08-24 DIAGNOSIS — M79.89 PAIN AND SWELLING OF LEFT LOWER LEG: Primary | ICD-10-CM

## 2023-08-24 PROCEDURE — 93971 EXTREMITY STUDY: CPT | Performed by: SURGERY

## 2023-08-24 PROCEDURE — 73502 X-RAY EXAM HIP UNI 2-3 VIEWS: CPT

## 2023-08-24 PROCEDURE — 73590 X-RAY EXAM OF LOWER LEG: CPT

## 2023-08-24 PROCEDURE — 73560 X-RAY EXAM OF KNEE 1 OR 2: CPT

## 2023-08-24 PROCEDURE — 99284 EMERGENCY DEPT VISIT MOD MDM: CPT

## 2023-08-24 PROCEDURE — 93971 EXTREMITY STUDY: CPT

## 2023-08-24 PROCEDURE — 76870 US EXAM SCROTUM: CPT

## 2023-08-24 PROCEDURE — 73552 X-RAY EXAM OF FEMUR 2/>: CPT

## 2023-08-25 ENCOUNTER — APPOINTMENT (OUTPATIENT)
Dept: ULTRASOUND IMAGING | Facility: HOSPITAL | Age: 68
End: 2023-08-25
Payer: MEDICARE

## 2023-08-25 VITALS
BODY MASS INDEX: 33.93 KG/M2 | WEIGHT: 256 LBS | OXYGEN SATURATION: 98 % | HEIGHT: 73 IN | RESPIRATION RATE: 16 BRPM | TEMPERATURE: 98.1 F | HEART RATE: 54 BPM | SYSTOLIC BLOOD PRESSURE: 129 MMHG | DIASTOLIC BLOOD PRESSURE: 69 MMHG

## 2023-08-25 PROCEDURE — 93975 VASCULAR STUDY: CPT

## 2023-08-25 NOTE — DISCHARGE INSTRUCTIONS
It was very nice to meet you, Gonzalo. Thank you for allowing us to take care of you today at [facility].    Your evaluation today did not show any emergent findings or have any emergent indications for admission to the hospital.     Please understand that an ER evaluation is just the start of your evaluation. We do the best we can, but we are often unable to fully figure out what is causing your symptoms from one evaluation. Thus, our goal is to determine whether you need to be evaluated in the hospital or if it is safe for you to go home and see other doctors such as a primary care physician or a specialist on an outpatient basis.     Like we discussed, it is very important that you follow up with your primary care doctor (call them to set up an appointment) within the next few days or as soon as possible so that you can be re-evaluated for improvement in your symptoms or for any other questions.     A copy of your results should be included in your paperwork.     Please return to the emergency room within 12-48 hours if you experience fever, chills, chest pain or shortness of breath, pain with inspiration/expiration, pain that travels to your arms, neck or back, nausea, vomiting, severe headache, tearing pain in your chest, dizziness, feel as though you are about to pass out, have any worsening symptoms, or any other concerns.
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
## 2023-10-31 ENCOUNTER — OFFICE VISIT (OUTPATIENT)
Dept: PULMONOLOGY | Facility: CLINIC | Age: 68
End: 2023-10-31
Payer: MEDICARE

## 2023-10-31 VITALS
SYSTOLIC BLOOD PRESSURE: 122 MMHG | DIASTOLIC BLOOD PRESSURE: 76 MMHG | RESPIRATION RATE: 18 BRPM | OXYGEN SATURATION: 100 % | HEART RATE: 64 BPM | WEIGHT: 253.8 LBS | HEIGHT: 71 IN | BODY MASS INDEX: 35.53 KG/M2

## 2023-10-31 DIAGNOSIS — G47.33 OBSTRUCTIVE SLEEP APNEA: Primary | ICD-10-CM

## 2023-10-31 DIAGNOSIS — E66.9 OBESITY (BMI 30-39.9): ICD-10-CM

## 2023-10-31 DIAGNOSIS — G25.81 RESTLESS LEG SYNDROME: ICD-10-CM

## 2023-10-31 DIAGNOSIS — G47.19 EXCESSIVE DAYTIME SLEEPINESS: ICD-10-CM

## 2023-10-31 DIAGNOSIS — R06.83 SNORING: ICD-10-CM

## 2023-10-31 DIAGNOSIS — Z79.891 CHRONICALLY ON OPIATE THERAPY: ICD-10-CM

## 2023-10-31 PROCEDURE — 3078F DIAST BP <80 MM HG: CPT | Performed by: INTERNAL MEDICINE

## 2023-10-31 PROCEDURE — 99204 OFFICE O/P NEW MOD 45 MIN: CPT | Performed by: INTERNAL MEDICINE

## 2023-10-31 PROCEDURE — 3074F SYST BP LT 130 MM HG: CPT | Performed by: INTERNAL MEDICINE

## 2023-10-31 RX ORDER — PRAMIPEXOLE DIHYDROCHLORIDE 1 MG/1
1 TABLET ORAL 2 TIMES DAILY
Qty: 60 TABLET | Refills: 5 | Status: SHIPPED | OUTPATIENT
Start: 2023-10-31 | End: 2023-10-31

## 2023-10-31 RX ORDER — ROTIGOTINE 4 MG/24H
1 PATCH, EXTENDED RELEASE TRANSDERMAL DAILY
Qty: 30 EACH | Refills: 5 | Status: SHIPPED | OUTPATIENT
Start: 2023-10-31

## 2023-10-31 RX ORDER — ROSUVASTATIN CALCIUM 5 MG/1
5 TABLET, COATED ORAL DAILY
COMMUNITY
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
## 2023-11-07 ENCOUNTER — PATIENT ROUNDING (BHMG ONLY) (OUTPATIENT)
Dept: PULMONOLOGY | Facility: CLINIC | Age: 68
End: 2023-11-07
Payer: MEDICARE

## 2023-12-28 ENCOUNTER — HOSPITAL ENCOUNTER (OUTPATIENT)
Dept: SLEEP MEDICINE | Facility: HOSPITAL | Age: 68
End: 2023-12-28
Payer: MEDICARE

## 2023-12-28 DIAGNOSIS — G47.33 OBSTRUCTIVE SLEEP APNEA: ICD-10-CM

## 2023-12-28 DIAGNOSIS — E66.9 OBESITY (BMI 30-39.9): ICD-10-CM

## 2023-12-28 DIAGNOSIS — Z79.891 CHRONICALLY ON OPIATE THERAPY: ICD-10-CM

## 2023-12-28 DIAGNOSIS — G47.19 EXCESSIVE DAYTIME SLEEPINESS: ICD-10-CM

## 2023-12-28 DIAGNOSIS — R06.83 SNORING: ICD-10-CM

## 2023-12-28 PROCEDURE — 95810 POLYSOM 6/> YRS 4/> PARAM: CPT

## 2024-01-03 DIAGNOSIS — G47.33 OSA (OBSTRUCTIVE SLEEP APNEA): Primary | ICD-10-CM

## 2024-03-13 ENCOUNTER — TRANSCRIBE ORDERS (OUTPATIENT)
Dept: ADMINISTRATIVE | Facility: HOSPITAL | Age: 69
End: 2024-03-13
Payer: MEDICARE

## 2024-03-13 DIAGNOSIS — S32.009K UNSPECIFIED FRACTURE OF UNSPECIFIED LUMBAR VERTEBRA, SUBSEQUENT ENCOUNTER FOR FRACTURE WITH NONUNION: ICD-10-CM

## 2024-03-13 DIAGNOSIS — M48.02 SPINAL STENOSIS IN CERVICAL REGION: Primary | ICD-10-CM

## 2024-05-01 ENCOUNTER — OFFICE VISIT (OUTPATIENT)
Dept: PULMONOLOGY | Facility: CLINIC | Age: 69
End: 2024-05-01
Payer: MEDICARE

## 2024-05-01 VITALS
OXYGEN SATURATION: 97 % | HEART RATE: 57 BPM | RESPIRATION RATE: 18 BRPM | SYSTOLIC BLOOD PRESSURE: 142 MMHG | HEIGHT: 71 IN | DIASTOLIC BLOOD PRESSURE: 89 MMHG | BODY MASS INDEX: 31.36 KG/M2 | WEIGHT: 224 LBS

## 2024-05-01 DIAGNOSIS — G47.19 EXCESSIVE DAYTIME SLEEPINESS: ICD-10-CM

## 2024-05-01 DIAGNOSIS — G47.33 OSA (OBSTRUCTIVE SLEEP APNEA): Primary | ICD-10-CM

## 2024-05-01 DIAGNOSIS — Z79.891 CHRONICALLY ON OPIATE THERAPY: ICD-10-CM

## 2024-05-01 DIAGNOSIS — G25.81 RESTLESS LEGS SYNDROME (RLS): ICD-10-CM

## 2024-05-01 PROCEDURE — 3079F DIAST BP 80-89 MM HG: CPT | Performed by: NURSE PRACTITIONER

## 2024-05-01 PROCEDURE — 99213 OFFICE O/P EST LOW 20 MIN: CPT | Performed by: NURSE PRACTITIONER

## 2024-05-01 PROCEDURE — 3077F SYST BP >= 140 MM HG: CPT | Performed by: NURSE PRACTITIONER

## 2024-05-01 RX ORDER — ARMODAFINIL 250 MG/1
250 TABLET ORAL DAILY
COMMUNITY

## 2024-05-03 ENCOUNTER — LAB (OUTPATIENT)
Dept: LAB | Facility: HOSPITAL | Age: 69
End: 2024-05-03
Payer: MEDICARE

## 2024-05-03 ENCOUNTER — HOSPITAL ENCOUNTER (OUTPATIENT)
Dept: GENERAL RADIOLOGY | Facility: HOSPITAL | Age: 69
Discharge: HOME | End: 2024-05-03
Payer: MEDICARE

## 2024-05-03 ENCOUNTER — TRANSCRIBE ORDERS (OUTPATIENT)
Dept: LAB | Facility: HOSPITAL | Age: 69
End: 2024-05-03
Payer: MEDICARE

## 2024-05-03 DIAGNOSIS — Z12.5 SPECIAL SCREENING FOR MALIGNANT NEOPLASM OF PROSTATE: ICD-10-CM

## 2024-05-03 DIAGNOSIS — E78.2 MIXED HYPERLIPIDEMIA: ICD-10-CM

## 2024-05-03 DIAGNOSIS — I10 ESSENTIAL HYPERTENSION, MALIGNANT: Primary | ICD-10-CM

## 2024-05-03 DIAGNOSIS — I10 ESSENTIAL HYPERTENSION, MALIGNANT: ICD-10-CM

## 2024-05-03 DIAGNOSIS — E53.8 BIOTIN-(PROPIONYL-COA-CARBOXYLASE) LIGASE DEFICIENCY: ICD-10-CM

## 2024-05-03 DIAGNOSIS — D50.0 IRON DEFICIENCY ANEMIA SECONDARY TO BLOOD LOSS (CHRONIC): ICD-10-CM

## 2024-05-03 DIAGNOSIS — M54.16 LUMBAR RADICULOPATHY, ACUTE: ICD-10-CM

## 2024-05-03 DIAGNOSIS — E55.9 VITAMIN D DEFICIENCY: ICD-10-CM

## 2024-05-03 DIAGNOSIS — E11.9 DIABETES MELLITUS WITHOUT COMPLICATION: ICD-10-CM

## 2024-05-03 LAB
25(OH)D3 SERPL-MCNC: 37.9 NG/ML (ref 30–100)
ALBUMIN SERPL-MCNC: 4.1 G/DL (ref 3.5–5.2)
ALBUMIN/GLOB SERPL: 2.1 G/DL
ALP SERPL-CCNC: 90 U/L (ref 39–117)
ALT SERPL W P-5'-P-CCNC: 38 U/L (ref 1–41)
ANION GAP SERPL CALCULATED.3IONS-SCNC: 7.6 MMOL/L (ref 5–15)
AST SERPL-CCNC: 31 U/L (ref 1–40)
BILIRUB SERPL-MCNC: 0.3 MG/DL (ref 0–1.2)
BUN SERPL-MCNC: 19 MG/DL (ref 8–23)
BUN/CREAT SERPL: 20.9 (ref 7–25)
CALCIUM SPEC-SCNC: 9.5 MG/DL (ref 8.6–10.5)
CHLORIDE SERPL-SCNC: 107 MMOL/L (ref 98–107)
CHOLEST SERPL-MCNC: 130 MG/DL (ref 0–200)
CO2 SERPL-SCNC: 28.4 MMOL/L (ref 22–29)
CREAT SERPL-MCNC: 0.91 MG/DL (ref 0.76–1.27)
DEPRECATED RDW RBC AUTO: 40.3 FL (ref 37–54)
EGFRCR SERPLBLD CKD-EPI 2021: 91.2 ML/MIN/1.73
ERYTHROCYTE [DISTWIDTH] IN BLOOD BY AUTOMATED COUNT: 12.4 % (ref 12.3–15.4)
GLOBULIN UR ELPH-MCNC: 2 GM/DL
GLUCOSE SERPL-MCNC: 121 MG/DL (ref 65–99)
HBA1C MFR BLD: 5.5 % (ref 4.8–5.6)
HCT VFR BLD AUTO: 42.5 % (ref 37.5–51)
HDLC SERPL-MCNC: 53 MG/DL (ref 40–60)
HGB BLD-MCNC: 13.5 G/DL (ref 13–17.7)
IRON 24H UR-MRATE: 58 MCG/DL (ref 59–158)
IRON SATN MFR SERPL: 15 % (ref 20–50)
LDLC SERPL CALC-MCNC: 64 MG/DL (ref 0–100)
LDLC/HDLC SERPL: 1.23 {RATIO}
MCH RBC QN AUTO: 28 PG (ref 26.6–33)
MCHC RBC AUTO-ENTMCNC: 31.8 G/DL (ref 31.5–35.7)
MCV RBC AUTO: 88.2 FL (ref 79–97)
PLATELET # BLD AUTO: 154 10*3/MM3 (ref 140–450)
PMV BLD AUTO: 10.2 FL (ref 6–12)
POTASSIUM SERPL-SCNC: 4.1 MMOL/L (ref 3.5–5.2)
PROT SERPL-MCNC: 6.1 G/DL (ref 6–8.5)
PSA SERPL-MCNC: 0.8 NG/ML (ref 0–4)
RBC # BLD AUTO: 4.82 10*6/MM3 (ref 4.14–5.8)
SODIUM SERPL-SCNC: 143 MMOL/L (ref 136–145)
TIBC SERPL-MCNC: 386 MCG/DL (ref 298–536)
TRANSFERRIN SERPL-MCNC: 259 MG/DL (ref 200–360)
TRIGL SERPL-MCNC: 59 MG/DL (ref 0–150)
VIT B12 BLD-MCNC: 420 PG/ML (ref 211–946)
VLDLC SERPL-MCNC: 13 MG/DL (ref 5–40)
WBC NRBC COR # BLD AUTO: 5.18 10*3/MM3 (ref 3.4–10.8)

## 2024-05-03 PROCEDURE — 80061 LIPID PANEL: CPT

## 2024-05-03 PROCEDURE — 85027 COMPLETE CBC AUTOMATED: CPT

## 2024-05-03 PROCEDURE — 36415 COLL VENOUS BLD VENIPUNCTURE: CPT

## 2024-05-03 PROCEDURE — 83036 HEMOGLOBIN GLYCOSYLATED A1C: CPT

## 2024-05-03 PROCEDURE — 84153 ASSAY OF PSA TOTAL: CPT

## 2024-05-03 PROCEDURE — 80053 COMPREHEN METABOLIC PANEL: CPT

## 2024-05-03 PROCEDURE — 82607 VITAMIN B-12: CPT

## 2024-05-03 PROCEDURE — 83540 ASSAY OF IRON: CPT

## 2024-05-03 PROCEDURE — 72114 X-RAY EXAM L-S SPINE BENDING: CPT

## 2024-05-03 PROCEDURE — 84466 ASSAY OF TRANSFERRIN: CPT

## 2024-05-03 PROCEDURE — 82306 VITAMIN D 25 HYDROXY: CPT

## 2024-06-11 ENCOUNTER — HOSPITAL ENCOUNTER (OUTPATIENT)
Dept: SLEEP MEDICINE | Facility: HOSPITAL | Age: 69
Discharge: HOME | End: 2024-06-11
Admitting: NURSE PRACTITIONER
Payer: MEDICARE

## 2024-06-11 DIAGNOSIS — G47.33 OSA (OBSTRUCTIVE SLEEP APNEA): ICD-10-CM

## 2024-06-11 PROCEDURE — 95811 POLYSOM 6/>YRS CPAP 4/> PARM: CPT

## 2024-06-28 ENCOUNTER — HOSPITAL ENCOUNTER (OUTPATIENT)
Dept: MRI IMAGING | Facility: HOSPITAL | Age: 69
Discharge: HOME | End: 2024-06-28
Payer: OTHER MISCELLANEOUS

## 2024-06-28 DIAGNOSIS — S32.009K UNSPECIFIED FRACTURE OF UNSPECIFIED LUMBAR VERTEBRA, SUBSEQUENT ENCOUNTER FOR FRACTURE WITH NONUNION: ICD-10-CM

## 2024-06-28 DIAGNOSIS — M48.02 SPINAL STENOSIS IN CERVICAL REGION: ICD-10-CM

## 2024-06-28 PROCEDURE — 72146 MRI CHEST SPINE W/O DYE: CPT

## 2024-06-28 PROCEDURE — 72141 MRI NECK SPINE W/O DYE: CPT

## 2024-08-08 ENCOUNTER — LAB (OUTPATIENT)
Dept: LAB | Facility: HOSPITAL | Age: 69
End: 2024-08-08
Payer: MEDICARE

## 2024-08-08 ENCOUNTER — TRANSCRIBE ORDERS (OUTPATIENT)
Dept: LAB | Facility: HOSPITAL | Age: 69
End: 2024-08-08
Payer: MEDICARE

## 2024-08-08 ENCOUNTER — HOSPITAL ENCOUNTER (OUTPATIENT)
Dept: GENERAL RADIOLOGY | Facility: HOSPITAL | Age: 69
Discharge: HOME | End: 2024-08-08
Payer: MEDICARE

## 2024-08-08 DIAGNOSIS — R53.83 TIREDNESS: Primary | ICD-10-CM

## 2024-08-08 DIAGNOSIS — D50.0 IRON DEFICIENCY ANEMIA SECONDARY TO BLOOD LOSS (CHRONIC): ICD-10-CM

## 2024-08-08 DIAGNOSIS — E11.9 DIABETES MELLITUS WITHOUT COMPLICATION: ICD-10-CM

## 2024-08-08 DIAGNOSIS — E53.8 BIOTIN-(PROPIONYL-COA-CARBOXYLASE) LIGASE DEFICIENCY: ICD-10-CM

## 2024-08-08 DIAGNOSIS — R53.83 TIREDNESS: ICD-10-CM

## 2024-08-08 DIAGNOSIS — Z12.5 SPECIAL SCREENING FOR MALIGNANT NEOPLASM OF PROSTATE: ICD-10-CM

## 2024-08-08 DIAGNOSIS — I10 ESSENTIAL HYPERTENSION, MALIGNANT: ICD-10-CM

## 2024-08-08 DIAGNOSIS — K59.09 CHRONIC CONSTIPATION: ICD-10-CM

## 2024-08-08 LAB
BASOPHILS # BLD AUTO: 0.03 10*3/MM3 (ref 0–0.2)
BASOPHILS NFR BLD AUTO: 0.6 % (ref 0–1.5)
CREAT UR-MCNC: 172.4 MG/DL
DEPRECATED RDW RBC AUTO: 39.6 FL (ref 37–54)
EOSINOPHIL # BLD AUTO: 0.32 10*3/MM3 (ref 0–0.4)
EOSINOPHIL NFR BLD AUTO: 6.6 % (ref 0.3–6.2)
ERYTHROCYTE [DISTWIDTH] IN BLOOD BY AUTOMATED COUNT: 12.2 % (ref 12.3–15.4)
HBA1C MFR BLD: 5.4 % (ref 4.8–5.6)
HCT VFR BLD AUTO: 43.2 % (ref 37.5–51)
HGB BLD-MCNC: 14.3 G/DL (ref 13–17.7)
IMM GRANULOCYTES # BLD AUTO: 0.01 10*3/MM3 (ref 0–0.05)
IMM GRANULOCYTES NFR BLD AUTO: 0.2 % (ref 0–0.5)
LYMPHOCYTES # BLD AUTO: 1.22 10*3/MM3 (ref 0.7–3.1)
LYMPHOCYTES NFR BLD AUTO: 25.1 % (ref 19.6–45.3)
MCH RBC QN AUTO: 29.5 PG (ref 26.6–33)
MCHC RBC AUTO-ENTMCNC: 33.1 G/DL (ref 31.5–35.7)
MCV RBC AUTO: 89.3 FL (ref 79–97)
MONOCYTES # BLD AUTO: 0.38 10*3/MM3 (ref 0.1–0.9)
MONOCYTES NFR BLD AUTO: 7.8 % (ref 5–12)
NEUTROPHILS NFR BLD AUTO: 2.91 10*3/MM3 (ref 1.7–7)
NEUTROPHILS NFR BLD AUTO: 59.7 % (ref 42.7–76)
NRBC BLD AUTO-RTO: 0 /100 WBC (ref 0–0.2)
PLATELET # BLD AUTO: 154 10*3/MM3 (ref 140–450)
PMV BLD AUTO: 10.3 FL (ref 6–12)
PROT ?TM UR-MCNC: 11.8 MG/DL
PROT/CREAT UR: 68.4 MG/G CREA (ref 0–200)
RBC # BLD AUTO: 4.84 10*6/MM3 (ref 4.14–5.8)
WBC NRBC COR # BLD AUTO: 4.87 10*3/MM3 (ref 3.4–10.8)

## 2024-08-08 PROCEDURE — G0103 PSA SCREENING: HCPCS

## 2024-08-08 PROCEDURE — 83540 ASSAY OF IRON: CPT

## 2024-08-08 PROCEDURE — 74022 RADEX COMPL AQT ABD SERIES: CPT

## 2024-08-08 PROCEDURE — 82652 VIT D 1 25-DIHYDROXY: CPT

## 2024-08-08 PROCEDURE — 84466 ASSAY OF TRANSFERRIN: CPT

## 2024-08-08 PROCEDURE — 83036 HEMOGLOBIN GLYCOSYLATED A1C: CPT

## 2024-08-08 PROCEDURE — 80053 COMPREHEN METABOLIC PANEL: CPT

## 2024-08-08 PROCEDURE — 82570 ASSAY OF URINE CREATININE: CPT

## 2024-08-08 PROCEDURE — 84443 ASSAY THYROID STIM HORMONE: CPT

## 2024-08-08 PROCEDURE — 84439 ASSAY OF FREE THYROXINE: CPT

## 2024-08-08 PROCEDURE — 82607 VITAMIN B-12: CPT

## 2024-08-08 PROCEDURE — 85025 COMPLETE CBC W/AUTO DIFF WBC: CPT

## 2024-08-08 PROCEDURE — 84156 ASSAY OF PROTEIN URINE: CPT

## 2024-08-08 PROCEDURE — 36415 COLL VENOUS BLD VENIPUNCTURE: CPT

## 2024-08-09 LAB
ALBUMIN SERPL-MCNC: 4 G/DL (ref 3.5–5.2)
ALBUMIN/GLOB SERPL: 2 G/DL
ALP SERPL-CCNC: 86 U/L (ref 39–117)
ALT SERPL W P-5'-P-CCNC: 21 U/L (ref 1–41)
ANION GAP SERPL CALCULATED.3IONS-SCNC: 7.1 MMOL/L (ref 5–15)
AST SERPL-CCNC: 22 U/L (ref 1–40)
BILIRUB SERPL-MCNC: 0.2 MG/DL (ref 0–1.2)
BUN SERPL-MCNC: 15 MG/DL (ref 8–23)
BUN/CREAT SERPL: 17.6 (ref 7–25)
CALCIUM SPEC-SCNC: 9.2 MG/DL (ref 8.6–10.5)
CHLORIDE SERPL-SCNC: 108 MMOL/L (ref 98–107)
CO2 SERPL-SCNC: 27.9 MMOL/L (ref 22–29)
CREAT SERPL-MCNC: 0.85 MG/DL (ref 0.76–1.27)
EGFRCR SERPLBLD CKD-EPI 2021: 94.1 ML/MIN/1.73
GLOBULIN UR ELPH-MCNC: 2 GM/DL
GLUCOSE SERPL-MCNC: 95 MG/DL (ref 65–99)
IRON 24H UR-MRATE: 55 MCG/DL (ref 59–158)
IRON SATN MFR SERPL: 15 % (ref 20–50)
POTASSIUM SERPL-SCNC: 3.9 MMOL/L (ref 3.5–5.2)
PROT SERPL-MCNC: 6 G/DL (ref 6–8.5)
PSA SERPL-MCNC: 0.72 NG/ML (ref 0–4)
SODIUM SERPL-SCNC: 143 MMOL/L (ref 136–145)
T4 FREE SERPL-MCNC: 0.99 NG/DL (ref 0.92–1.68)
TIBC SERPL-MCNC: 374 MCG/DL (ref 298–536)
TRANSFERRIN SERPL-MCNC: 251 MG/DL (ref 200–360)
TSH SERPL DL<=0.05 MIU/L-ACNC: 1.65 UIU/ML (ref 0.27–4.2)
VIT B12 BLD-MCNC: 442 PG/ML (ref 211–946)

## 2024-08-12 LAB — 1,25(OH)2D SERPL-MCNC: 45.7 PG/ML (ref 24.8–81.5)

## 2024-08-16 PROCEDURE — 87635 SARS-COV-2 COVID-19 AMP PRB: CPT | Performed by: NURSE PRACTITIONER
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
## 2024-08-29 ENCOUNTER — OFFICE VISIT (OUTPATIENT)
Dept: SURGERY | Facility: CLINIC | Age: 69
End: 2024-08-29
Payer: MEDICARE

## 2024-08-29 VITALS
BODY MASS INDEX: 30.94 KG/M2 | SYSTOLIC BLOOD PRESSURE: 140 MMHG | OXYGEN SATURATION: 97 % | HEIGHT: 71 IN | HEART RATE: 86 BPM | DIASTOLIC BLOOD PRESSURE: 82 MMHG | WEIGHT: 221 LBS | TEMPERATURE: 98.6 F

## 2024-08-29 DIAGNOSIS — K42.9 UMBILICAL HERNIA WITHOUT OBSTRUCTION AND WITHOUT GANGRENE: Primary | ICD-10-CM

## 2024-08-29 PROCEDURE — 1160F RVW MEDS BY RX/DR IN RCRD: CPT | Performed by: SURGERY

## 2024-08-29 PROCEDURE — 3077F SYST BP >= 140 MM HG: CPT | Performed by: SURGERY

## 2024-08-29 PROCEDURE — 3079F DIAST BP 80-89 MM HG: CPT | Performed by: SURGERY

## 2024-08-29 PROCEDURE — 99203 OFFICE O/P NEW LOW 30 MIN: CPT | Performed by: SURGERY

## 2024-08-29 PROCEDURE — 1159F MED LIST DOCD IN RCRD: CPT | Performed by: SURGERY

## 2024-08-29 RX ORDER — PANTOPRAZOLE SODIUM 40 MG/1
TABLET, DELAYED RELEASE ORAL
COMMUNITY
Start: 2024-08-28

## 2024-09-03 ENCOUNTER — OFFICE VISIT (OUTPATIENT)
Dept: PULMONOLOGY | Facility: CLINIC | Age: 69
End: 2024-09-03
Payer: MEDICARE

## 2024-09-03 VITALS
BODY MASS INDEX: 31.53 KG/M2 | SYSTOLIC BLOOD PRESSURE: 126 MMHG | OXYGEN SATURATION: 96 % | RESPIRATION RATE: 18 BRPM | DIASTOLIC BLOOD PRESSURE: 72 MMHG | HEIGHT: 71 IN | HEART RATE: 80 BPM | WEIGHT: 225.2 LBS

## 2024-09-03 DIAGNOSIS — E66.9 OBESITY (BMI 30-39.9): ICD-10-CM

## 2024-09-03 DIAGNOSIS — G47.10 HYPERSOMNIA: ICD-10-CM

## 2024-09-03 DIAGNOSIS — G47.33 OSA (OBSTRUCTIVE SLEEP APNEA): Primary | ICD-10-CM

## 2024-09-03 DIAGNOSIS — G25.81 RESTLESS LEG SYNDROME: ICD-10-CM

## 2024-09-03 PROCEDURE — 3078F DIAST BP <80 MM HG: CPT | Performed by: INTERNAL MEDICINE

## 2024-09-03 PROCEDURE — 99214 OFFICE O/P EST MOD 30 MIN: CPT | Performed by: INTERNAL MEDICINE

## 2024-09-03 PROCEDURE — 3074F SYST BP LT 130 MM HG: CPT | Performed by: INTERNAL MEDICINE

## 2024-09-03 NOTE — PROGRESS NOTES
"  Chief Complaint   Patient presents with    Sleeping Problem    Follow-up       Subjective   Gonzalo Oates is a 69 y.o. male.   Patient was evaluated today for follow up of Sleep apnea. Patient does report improvement when he was on BiPAP a few years ago, but was switched to CPAP but could not use it as he smothered with it.     The CPAP actually was picked up by DME company due to non compliance.     The patient also is complaining of multiple issues with regards to restlessness and pain in his lower extremities.    The patient says that he has been tried on multiple medications in the past including Mirapex, Neurontin and even Neupro patch.    He is actually currently on clonazepam and Neurontin.  He also has Percocet for as needed use.    He was started on Nuvigil, by his primary care provider for continued daytime sleepiness and although he did feel some improvement initially, he feels that he becomes sleepy around 4 PM on a regular basis and sometimes has issues doing activities.    The following portions of the patient's history were reviewed and updated as appropriate: allergies, current medications, past family history, past medical history, past social history, and past surgical history.    Review of Systems   HENT:  Positive for sinus pressure. Negative for sneezing and sore throat.    Respiratory:  Positive for shortness of breath and wheezing. Negative for cough and chest tightness.    Psychiatric/Behavioral:  Positive for sleep disturbance.        Objective   Visit Vitals  /72   Pulse 80   Resp 18   Ht 180.3 cm (70.98\") Comment: pt reported   Wt 102 kg (225 lb 3.2 oz)   SpO2 96%   BMI 31.42 kg/m²       BMI Readings from Last 8 Encounters:   09/03/24 31.42 kg/m²   08/29/24 30.84 kg/m²   08/17/24 31.38 kg/m²   08/16/24 31.10 kg/m²   05/01/24 31.26 kg/m²   06/28/24 31.26 kg/m²   10/31/23 35.40 kg/m²   08/24/23 33.78 kg/m²       Physical Exam  Vitals reviewed.   Constitutional:       Appearance: " He is well-developed.   HENT:      Head: Atraumatic.      Mouth/Throat:      Comments: Oropharynx was crowded.  Neurological:      Mental Status: He is alert and oriented to person, place, and time.           Assessment & Plan   Diagnoses and all orders for this visit:    1. KO (obstructive sleep apnea) (Primary)  -     BIPAP / CPAP Adjustment    2. Obesity (BMI 30-39.9)  -     BIPAP / CPAP Adjustment    3. Restless leg syndrome           Return in about 5 months (around 2/6/2025) for SleepONLY/Carisa.    DISCUSSION (if any):  Titration study performed in June 2024    I told the patient that his symptoms are consistent with poorly controlled sleep apnea.    Since the patient appears to be intolerant to CPAP, and continues to have symptoms suggestive of poorly controlled obstructive sleep apnea, we will try BiPAP at empiric pressure of 10/5.    Further adjustments may be necessary based on his symptoms.    If the symptoms do not improve, even after undertaking the above measures, the patient may have to undergo a repeat full night BiPAP titration study.    Patient was advised to continue using PAP for at least 4 hours every night.    Patient was advised to call this office with any issues.    As far as RLS is concerned, I told the patient that he has tried multiple medications and none of them appear to work for a prolonged duration of time.    I told the patient that it is possible, and in fact quite likely, that there may be a second etiology, such as radiculopathy (that he already has a history of) versus neuropathy etc., that may be contributing and may be the reason that is not responding to medications.    As far as hypersomnia is concerned, I once again told the patient that he will need to be compliant with BiPAP before any further recommendations can be made.    He has already been started on Nuvigil 250 mg per PCP.      We discussed a strategy of taking Nuvigil closer to 12 noon on a daily basis to see if  that can help with symptom resolution for longer/later in the day.    If BiPAP is arranged and he is compliant with the BiPAP, but continues to have issues with daytime sleepiness/hypersomnia, then we may consider adding low dose Adderall/Ritalin PRN.    I also discussed the fact that he is on multiple medications that could be contributing towards excessive sleepiness.      Dictated utilizing Dragon dictation.    This document was electronically signed by Jill Lewis MD on 09/03/24 at 16:05 EDT

## 2024-09-20 ENCOUNTER — TELEPHONE (OUTPATIENT)
Dept: SURGERY | Facility: CLINIC | Age: 69
End: 2024-09-20
Payer: MEDICARE

## 2024-10-03 ENCOUNTER — PREP FOR SURGERY (OUTPATIENT)
Dept: OTHER | Facility: HOSPITAL | Age: 69
End: 2024-10-03
Payer: MEDICARE

## 2024-10-03 DIAGNOSIS — K42.9 UMBILICAL HERNIA WITHOUT OBSTRUCTION AND WITHOUT GANGRENE: Primary | ICD-10-CM

## 2024-10-03 RX ORDER — SODIUM CHLORIDE, SODIUM LACTATE, POTASSIUM CHLORIDE, CALCIUM CHLORIDE 600; 310; 30; 20 MG/100ML; MG/100ML; MG/100ML; MG/100ML
50 INJECTION, SOLUTION INTRAVENOUS CONTINUOUS
OUTPATIENT
Start: 2024-10-03

## 2024-10-03 RX ORDER — HEPARIN SODIUM 5000 [USP'U]/ML
5000 INJECTION, SOLUTION INTRAVENOUS; SUBCUTANEOUS ONCE
OUTPATIENT
Start: 2024-10-03 | End: 2024-10-03

## 2024-10-03 RX ORDER — SODIUM CHLORIDE 0.9 % (FLUSH) 0.9 %
10 SYRINGE (ML) INJECTION AS NEEDED
OUTPATIENT
Start: 2024-10-03

## 2024-10-03 RX ORDER — SODIUM CHLORIDE 0.9 % (FLUSH) 0.9 %
10 SYRINGE (ML) INJECTION EVERY 12 HOURS SCHEDULED
OUTPATIENT
Start: 2024-10-03

## 2024-10-03 RX ORDER — SODIUM CHLORIDE 9 MG/ML
40 INJECTION, SOLUTION INTRAVENOUS AS NEEDED
OUTPATIENT
Start: 2024-10-03

## 2024-12-05 ENCOUNTER — OFFICE VISIT (OUTPATIENT)
Dept: ORTHOPEDIC SURGERY | Facility: CLINIC | Age: 69
End: 2024-12-05
Payer: MEDICARE

## 2024-12-05 VITALS
DIASTOLIC BLOOD PRESSURE: 84 MMHG | SYSTOLIC BLOOD PRESSURE: 150 MMHG | WEIGHT: 230 LBS | BODY MASS INDEX: 32.2 KG/M2 | HEIGHT: 71 IN

## 2024-12-05 DIAGNOSIS — M17.12 PRIMARY OSTEOARTHRITIS OF LEFT KNEE: Primary | ICD-10-CM

## 2024-12-05 RX ORDER — PRAMIPEXOLE DIHYDROCHLORIDE 1 MG/1
TABLET ORAL
COMMUNITY
Start: 2024-09-23

## 2024-12-05 RX ADMIN — METHYLPREDNISOLONE ACETATE 40 MG: 40 INJECTION, SUSPENSION INTRA-ARTICULAR; INTRALESIONAL; INTRAMUSCULAR; SOFT TISSUE at 15:39

## 2024-12-05 RX ADMIN — LIDOCAINE HYDROCHLORIDE 2 ML: 20 INJECTION, SOLUTION INFILTRATION; PERINEURAL at 15:39
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
## 2024-12-06 RX ORDER — METHYLPREDNISOLONE ACETATE 40 MG/ML
40 INJECTION, SUSPENSION INTRA-ARTICULAR; INTRALESIONAL; INTRAMUSCULAR; SOFT TISSUE
Status: COMPLETED | OUTPATIENT
Start: 2024-12-05 | End: 2024-12-05

## 2024-12-06 RX ORDER — LIDOCAINE HYDROCHLORIDE 20 MG/ML
2 INJECTION, SOLUTION INFILTRATION; PERINEURAL
Status: COMPLETED | OUTPATIENT
Start: 2024-12-05 | End: 2024-12-05

## 2025-01-07 ENCOUNTER — TRANSCRIBE ORDERS (OUTPATIENT)
Dept: LAB | Facility: HOSPITAL | Age: 70
End: 2025-01-07
Payer: MEDICARE

## 2025-01-07 DIAGNOSIS — I10 ESSENTIAL HYPERTENSION, BENIGN: Primary | ICD-10-CM

## 2025-01-07 DIAGNOSIS — D50.0 BLOOD LOSS ANEMIA: ICD-10-CM

## 2025-01-07 DIAGNOSIS — E11.9 DIABETES MELLITUS WITHOUT COMPLICATION: ICD-10-CM

## 2025-01-07 DIAGNOSIS — E78.2 MIXED HYPERLIPIDEMIA: ICD-10-CM

## 2025-01-10 ENCOUNTER — LAB (OUTPATIENT)
Dept: LAB | Facility: HOSPITAL | Age: 70
End: 2025-01-10
Payer: MEDICARE

## 2025-01-10 DIAGNOSIS — E11.9 DIABETES MELLITUS WITHOUT COMPLICATION: ICD-10-CM

## 2025-01-10 DIAGNOSIS — I10 ESSENTIAL HYPERTENSION, BENIGN: ICD-10-CM

## 2025-01-10 DIAGNOSIS — E78.2 MIXED HYPERLIPIDEMIA: ICD-10-CM

## 2025-01-10 DIAGNOSIS — D50.0 BLOOD LOSS ANEMIA: ICD-10-CM

## 2025-01-10 LAB
ALBUMIN SERPL-MCNC: 3.9 G/DL (ref 3.5–5.2)
ALBUMIN/GLOB SERPL: 1.7 G/DL
ALP SERPL-CCNC: 81 U/L (ref 39–117)
ALT SERPL W P-5'-P-CCNC: 24 U/L (ref 1–41)
ANION GAP SERPL CALCULATED.3IONS-SCNC: 7 MMOL/L (ref 5–15)
AST SERPL-CCNC: 24 U/L (ref 1–40)
BILIRUB SERPL-MCNC: 0.4 MG/DL (ref 0–1.2)
BUN SERPL-MCNC: 13 MG/DL (ref 8–23)
BUN/CREAT SERPL: 14.9 (ref 7–25)
CALCIUM SPEC-SCNC: 9.3 MG/DL (ref 8.6–10.5)
CHLORIDE SERPL-SCNC: 105 MMOL/L (ref 98–107)
CHOLEST SERPL-MCNC: 125 MG/DL (ref 0–200)
CO2 SERPL-SCNC: 27 MMOL/L (ref 22–29)
CREAT SERPL-MCNC: 0.87 MG/DL (ref 0.76–1.27)
DEPRECATED RDW RBC AUTO: 39.7 FL (ref 37–54)
EGFRCR SERPLBLD CKD-EPI 2021: 93.4 ML/MIN/1.73
ERYTHROCYTE [DISTWIDTH] IN BLOOD BY AUTOMATED COUNT: 12.3 % (ref 12.3–15.4)
FERRITIN SERPL-MCNC: 88.8 NG/ML (ref 30–400)
GLOBULIN UR ELPH-MCNC: 2.3 GM/DL
GLUCOSE SERPL-MCNC: 97 MG/DL (ref 65–99)
HBA1C MFR BLD: 5.1 % (ref 4.8–5.6)
HCT VFR BLD AUTO: 41.6 % (ref 37.5–51)
HDLC SERPL-MCNC: 55 MG/DL (ref 40–60)
HGB BLD-MCNC: 14.4 G/DL (ref 13–17.7)
IRON 24H UR-MRATE: 69 MCG/DL (ref 59–158)
IRON SATN MFR SERPL: 17 % (ref 20–50)
LDLC SERPL CALC-MCNC: 61 MG/DL (ref 0–100)
LDLC/HDLC SERPL: 1.15 {RATIO}
MCH RBC QN AUTO: 30.7 PG (ref 26.6–33)
MCHC RBC AUTO-ENTMCNC: 34.6 G/DL (ref 31.5–35.7)
MCV RBC AUTO: 88.7 FL (ref 79–97)
PLATELET # BLD AUTO: 140 10*3/MM3 (ref 140–450)
PMV BLD AUTO: 10 FL (ref 6–12)
POTASSIUM SERPL-SCNC: 4.1 MMOL/L (ref 3.5–5.2)
PROT SERPL-MCNC: 6.2 G/DL (ref 6–8.5)
RBC # BLD AUTO: 4.69 10*6/MM3 (ref 4.14–5.8)
SODIUM SERPL-SCNC: 139 MMOL/L (ref 136–145)
TIBC SERPL-MCNC: 401 MCG/DL (ref 298–536)
TRANSFERRIN SERPL-MCNC: 269 MG/DL (ref 200–360)
TRIGL SERPL-MCNC: 34 MG/DL (ref 0–150)
VLDLC SERPL-MCNC: 9 MG/DL (ref 5–40)
WBC NRBC COR # BLD AUTO: 4.67 10*3/MM3 (ref 3.4–10.8)

## 2025-01-10 PROCEDURE — 84466 ASSAY OF TRANSFERRIN: CPT

## 2025-01-10 PROCEDURE — 80053 COMPREHEN METABOLIC PANEL: CPT

## 2025-01-10 PROCEDURE — 36415 COLL VENOUS BLD VENIPUNCTURE: CPT

## 2025-01-10 PROCEDURE — 80061 LIPID PANEL: CPT

## 2025-01-10 PROCEDURE — 83036 HEMOGLOBIN GLYCOSYLATED A1C: CPT

## 2025-01-10 PROCEDURE — 82728 ASSAY OF FERRITIN: CPT

## 2025-01-10 PROCEDURE — 85027 COMPLETE CBC AUTOMATED: CPT

## 2025-01-10 PROCEDURE — 83540 ASSAY OF IRON: CPT

## 2025-02-05 ENCOUNTER — OFFICE VISIT (OUTPATIENT)
Dept: PULMONOLOGY | Facility: CLINIC | Age: 70
End: 2025-02-05
Payer: MEDICARE

## 2025-02-05 VITALS
DIASTOLIC BLOOD PRESSURE: 84 MMHG | BODY MASS INDEX: 33.65 KG/M2 | WEIGHT: 240.4 LBS | OXYGEN SATURATION: 95 % | HEART RATE: 65 BPM | SYSTOLIC BLOOD PRESSURE: 126 MMHG | RESPIRATION RATE: 18 BRPM | HEIGHT: 71 IN

## 2025-02-05 DIAGNOSIS — G47.33 OSA (OBSTRUCTIVE SLEEP APNEA): Primary | ICD-10-CM

## 2025-02-05 DIAGNOSIS — G25.81 RESTLESS LEG SYNDROME: ICD-10-CM

## 2025-02-05 DIAGNOSIS — E66.9 OBESITY (BMI 30-39.9): ICD-10-CM

## 2025-02-05 PROCEDURE — 3079F DIAST BP 80-89 MM HG: CPT | Performed by: NURSE PRACTITIONER

## 2025-02-05 PROCEDURE — 99213 OFFICE O/P EST LOW 20 MIN: CPT | Performed by: NURSE PRACTITIONER

## 2025-02-05 PROCEDURE — 3074F SYST BP LT 130 MM HG: CPT | Performed by: NURSE PRACTITIONER

## 2025-02-05 RX ORDER — MODAFINIL 100 MG/1
200 TABLET ORAL DAILY
COMMUNITY

## 2025-02-05 NOTE — PROGRESS NOTES
"Chief Complaint   Patient presents with    Sleeping Problem    Follow-up         Subjective   Gonzalo Oates is a 69 y.o. male.   The patient states he changed his BiPAP pressure himself to 8/3 because 10/5 was too jocelyn.     He had a lot of pain and he wakes multiple times a night.     He states he is being sued by workmans comp. He c/o back pain along with RLS.    The patient states his current RLS symptoms are not controlled. He is currently on mirapex and gabapentin. He takes gabapentin 3 times a day and mirapex twice a day.     He also states that he knows mirapex is counter productive and a person has to go off the medication sometimes.    He states he has to sleep in a different room from wife and machine is not in that room.     He has to get up to to walk every 45 minutes.     After some confusion about which machine he is using he states he is using the Resmed machine.     He states he is no longer taking armodafinil. His PCP switched him to Modafinil. He states no one will give him the medication that really works but he cannot think of the name.       The following portions of the patient's history were reviewed and updated as appropriate: allergies, current medications, past family history, past medical history, past social history, and past surgical history.      Review of Systems   HENT:  Positive for sinus pressure. Negative for sneezing and sore throat.    Respiratory:  Positive for shortness of breath. Negative for cough, chest tightness and wheezing.    Psychiatric/Behavioral:  Positive for sleep disturbance.        Objective   Visit Vitals  /84   Pulse 65   Resp 18   Ht 180.3 cm (70.98\")   Wt 109 kg (240 lb 6.4 oz)   SpO2 95%   BMI 33.54 kg/m²         Physical Exam  Vitals reviewed.   Constitutional:       Appearance: He is well-developed.   HENT:      Head: Atraumatic.      Mouth/Throat:      Mouth: Mucous membranes are moist.   Eyes:      Extraocular Movements: Extraocular movements " intact.   Cardiovascular:      Rate and Rhythm: Normal rate and regular rhythm.   Abdominal:      Comments: Obese abdomen.    Neurological:      Mental Status: He is alert and oriented to person, place, and time.           Assessment & Plan   Diagnoses and all orders for this visit:    1. KO (obstructive sleep apnea) (Primary)    2. Obesity (BMI 30-39.9)    3. Restless leg syndrome           Return in about 8 months (around 10/5/2025) for Recheck, For Dr. Lewis, Sleep ONLY.    DISCUSSION (if any):  He has multiple reasons he is not using CPAP machine, see above.    I have asked the patient to try to have more consistency in using the machine. I told him to move the machine to the room that he is currently sleeping in.     He seems to think we do not want to treat his RLS but he states he has tried mirapex, requip, codeine, gabapentin among other medications and none of these have helped.     I have discussed with the patient that unfortunately, he has tried multiple medications to treat restless leg syndrome even some of those which are off label use for that treatment and nothing has seemed to work well.  I have explained to him that I do not have any other medications to prescribe him except for the ones he has already tried or is currently on.  He verbalizes understanding.    He does admit to radiculopathy on testing.     His daytime sleepiness could be due to medications including clonazepam, gabapentin, oxycodone and Zanaflex.    He is prescribed wake promoting medication now by PCP and f/w pain management and orthopedics.     I will have patient return in 8 months.    Dictated utilizing Dragon dictation.    This document was electronically signed by ZAHIDA Gross February 7, 2025  10:47 EST    Adbry Counseling: I discussed with the patient the risks of tralokinumab including but not limited to eye infection and irritation, cold sores, injection site reactions, worsening of asthma, allergic reactions and increased risk of parasitic infection.  Live vaccines should be avoided while taking tralokinumab. The patient understands that monitoring is required and they must alert us or the primary physician if symptoms of infection or other concerning signs are noted.

## 2025-02-10 ENCOUNTER — OFFICE VISIT (OUTPATIENT)
Dept: ORTHOPEDIC SURGERY | Facility: CLINIC | Age: 70
End: 2025-02-10
Payer: MEDICARE

## 2025-02-10 VITALS
HEIGHT: 71 IN | WEIGHT: 239 LBS | SYSTOLIC BLOOD PRESSURE: 112 MMHG | BODY MASS INDEX: 33.46 KG/M2 | DIASTOLIC BLOOD PRESSURE: 70 MMHG

## 2025-02-10 DIAGNOSIS — M21.162 ACQUIRED VARUS DEFORMITY KNEE, LEFT: ICD-10-CM

## 2025-02-10 DIAGNOSIS — M50.30 DEGENERATIVE DISC DISEASE, CERVICAL: ICD-10-CM

## 2025-02-10 DIAGNOSIS — M25.561 ARTHRALGIA OF BOTH KNEES: ICD-10-CM

## 2025-02-10 DIAGNOSIS — M17.0 PRIMARY OSTEOARTHRITIS OF BOTH KNEES: Primary | ICD-10-CM

## 2025-02-10 DIAGNOSIS — M51.369 DEGENERATION OF INTERVERTEBRAL DISC OF LUMBAR REGION, UNSPECIFIED WHETHER PAIN PRESENT: ICD-10-CM

## 2025-02-10 DIAGNOSIS — M25.562 ARTHRALGIA OF BOTH KNEES: ICD-10-CM

## 2025-02-10 PROCEDURE — 3074F SYST BP LT 130 MM HG: CPT | Performed by: ORTHOPAEDIC SURGERY

## 2025-02-10 PROCEDURE — 99214 OFFICE O/P EST MOD 30 MIN: CPT | Performed by: ORTHOPAEDIC SURGERY

## 2025-02-10 PROCEDURE — 1159F MED LIST DOCD IN RCRD: CPT | Performed by: ORTHOPAEDIC SURGERY

## 2025-02-10 PROCEDURE — 1160F RVW MEDS BY RX/DR IN RCRD: CPT | Performed by: ORTHOPAEDIC SURGERY

## 2025-02-10 PROCEDURE — 3078F DIAST BP <80 MM HG: CPT | Performed by: ORTHOPAEDIC SURGERY
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
## 2025-02-13 ENCOUNTER — TREATMENT (OUTPATIENT)
Dept: PHYSICAL THERAPY | Facility: CLINIC | Age: 70
End: 2025-02-13
Payer: OTHER MISCELLANEOUS

## 2025-02-13 DIAGNOSIS — M54.2 CERVICAL PAIN: Primary | ICD-10-CM

## 2025-02-13 PROCEDURE — 97110 THERAPEUTIC EXERCISES: CPT

## 2025-02-13 PROCEDURE — 97161 PT EVAL LOW COMPLEX 20 MIN: CPT

## 2025-02-13 NOTE — PROGRESS NOTES
Physical Therapy Initial Evaluation and Plan of Care  [address] [place] [address]      Patient: Gonzalo Oates   : 1955  Diagnosis/ICD-10 Code:  Cervical pain [M54.2]  Referring practitioner: Millie Madrid MD  Date of Initial Visit: 2025  Today's Date: 2025  Patient seen for 1 session         Visit Diagnoses:    ICD-10-CM ICD-9-CM   1. Cervical pain  M54.2 723.1         Subjective Questionnaire: NDI:26      Subjective Evaluation    History of Present Illness  Mechanism of injury: Pt reports 2012 pt was leaving his place of employment carrying boxes of his belongings to his car when he herniated a disk in his neck. Pt reports somewhere around COVID he had neck surgery consisting of a laminoplasty. Helped with his pain some. Has pain in the lower part of the neck and throughout the muscles of the neck which spasm. Notes severe HAs. Had physical therapy for it about 2 years ago/ Notes numbness and tingling in his hands but states this is from carpal tunnel. Notes pain into the arm on the L side as a dull throbbing pain. Can be sharp at the top of the shoulder blade (levator). Just had trigger point injections for the first time into the levator. Notes chronic low back pain also. Spends half of the day sitting and half moving around due to not being able to sit for very long. Takes lots of medications including NSAIDS, muscle relaxers and gabapentin. Heat helps. Falls frequently.      Patient Occupation: retired   Precautions and Work Restrictions: no lifting [place], no lifting greater than 10lbsQuality of life: fair    Pain  At best pain ratin  At worst pain ratin  Location: cervical spine and cervical muscles B  Quality: throbbing, radiating, sharp and dull ache  Relieving factors: change in position and heat  Aggravating factors: lifting, movement, overhead activity, prolonged positioning, outstretched reach and repetitive movement  Progression: no  change    Social Support  Lives with: alone    Hand dominance: right    Diagnostic Tests  X-ray: abnormal  MRI studies: abnormal    Treatments  Current treatment: injection treatment and medication  Patient Goals  Patient goals for therapy: decreased pain, increased motion and increased strength             Objective          Static Posture     Head  Forward.    Shoulders  Rounded.    Postural Observations  Seated posture: poor  Standing posture: poor    Additional Postural Observation Details  Severe decrease in cervical lordosis    Neurological Testing     Sensation   Cervical/Thoracic   Left   Intact: light touch    Right   Diminished: light touch    Reflexes   Left   Brachioradialis (C6): trace (1+)  Triceps (C7): normal (2+)    Right   Brachioradialis (C6): trace (1+)  Triceps (C7): normal (2+)    Active Range of Motion   Cervical/Thoracic Spine   Cervical    Flexion: 60 degrees   Extension: 30 degrees   Left lateral flexion: 30 degrees   Right lateral flexion: 35 degrees   Left rotation: 40 degrees   Right rotation: 45 degrees     Strength/Myotome Testing     Left Shoulder     Planes of Motion   Flexion: 4   Abduction: 4   External rotation at 0°: 4+   Internal rotation at 0°: 4+     Right Shoulder     Planes of Motion   Flexion: 4   Abduction: 4   External rotation at 0°: 4+   Internal rotation at 0°: 4+     Additional Strength Details  R  80lbs, L  70lbs  R pinch 28lbs L pinch 12lbs     General Comments     Cervical/Thoracic Comments  Shoulder ROM slightly limited in flexion, scaption       Pt education regarding the severity of his neck position and the effect this has not only on his cervical spine but the muscles/nerves surrounding.    Assessment & Plan       Assessment  Impairments: abnormal muscle tone, abnormal or restricted ROM, activity intolerance, impaired physical strength and pain with function   Functional limitations: carrying objects, lifting, pushing, sitting, reaching behind back,  reaching overhead and unable to perform repetitive tasks         History # of Personal Factors and/or Comorbidities: HIGH (3+)  Examination of Body System(s): # of elements: LOW (1-2)  Clinical Presentation: STABLE   Clinical Decision Making: LOW       Timed:         Manual Therapy:         mins  46143;     Therapeutic Exercise:   10      mins  68387;     Neuromuscular Gillian:       mins  86771;    Therapeutic Activity:          mins  28855;     Gait Training:          mins  22618;     Ultrasound:          mins  86537;    Ionto                                   mins   16523  Self Care                            mins   45280  Canalith Repos         mins 20770      Un-Timed:  Electrical Stimulation:         mins  25326 ( );  Dry Needling          mins self-pay  Traction          mins 42576  Low Eval   28      Mins  64031  Mod Eval          Mins  21735  High Eval                            Mins  83105        Timed Treatment:   10   mins   Total Treatment:     38   mins      PT: Aggie Tong PT     License Number: [id number]    Electronically signed by Aggie Tong PT, 02/13/25, 7:43 AM EST    Certification Period: 2/14/2025 thru 5/14/2025  I certify that the therapy services are furnished while this patient is under my care.  The services outlined above are required by this patient, and will be reviewed every 90 days.         Physician Signature:__________________________________________________    PHYSICIAN: Millie Madrid MD  NPI: [id number]      DATE:     Please sign and return via fax to [email or web address] . Thank you, [facility].

## 2025-02-19 ENCOUNTER — TREATMENT (OUTPATIENT)
Dept: PHYSICAL THERAPY | Facility: CLINIC | Age: 70
End: 2025-02-19
Payer: OTHER MISCELLANEOUS

## 2025-02-19 DIAGNOSIS — M54.2 CERVICAL PAIN: Primary | ICD-10-CM

## 2025-02-19 PROCEDURE — 97140 MANUAL THERAPY 1/> REGIONS: CPT

## 2025-02-19 PROCEDURE — 97110 THERAPEUTIC EXERCISES: CPT

## 2025-02-19 NOTE — PROGRESS NOTES
Physical Therapy Daily Treatment Note  [address] [place] [address]      Patient: Gonzalo Oates   : 1955  Referring practitioner: Millie Madrid MD  Date of Initial Visit: Type: THERAPY  Noted: 2025  Today's Date: 2025  Patient seen for 2 sessions         Visit Diagnoses:    ICD-10-CM ICD-9-CM   1. Cervical pain  M54.2 723.1       Subjective   Patient reports that he has not done the exercises due to being in [place].     Objective   See Exercise, Manual, and Modality Logs for complete treatment.     Assessment/Plan  Pt without report of increased pain during treatment including with addition of exercises and manual therapy.  Educated pt that since the exercises are done in laying down he should hopefully be able to perform them everyday before bed or when waking in the morning to aid with compliance. Pt's skin was in good condition following heat. Did discuss being careful with any heat he uses secondary to the possibility of burning his skin. Continue with current POT progressing pt per tolerance.         Timed:         Manual Therapy: 15        mins  23971;     Therapeutic Exercise:     23    mins  85512;     Neuromuscular Gillian:      mins  57664;    Therapeutic Activity:          mins  30064;     Gait Training:           mins  32696;     Ultrasound:          mins  21813;    Ionto                                   mins   23792  Self Care                            mins   47445  Canalith Repos         mins 49813      Un-Timed:  Electrical Stimulation:        mins  19051 ( );  Dry Needling          mins self-pay  Traction          mins 63672      Timed Treatment:   38   mins   Total Treatment:     38   mins    Aggie Tong, PT  [place] License: [id number]

## 2025-02-24 ENCOUNTER — TREATMENT (OUTPATIENT)
Dept: PHYSICAL THERAPY | Facility: CLINIC | Age: 70
End: 2025-02-24
Payer: OTHER MISCELLANEOUS

## 2025-02-24 DIAGNOSIS — M54.2 CERVICAL PAIN: Primary | ICD-10-CM

## 2025-02-24 PROCEDURE — 97110 THERAPEUTIC EXERCISES: CPT

## 2025-02-24 PROCEDURE — 97140 MANUAL THERAPY 1/> REGIONS: CPT
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
## 2025-02-27 ENCOUNTER — TREATMENT (OUTPATIENT)
Dept: PHYSICAL THERAPY | Facility: CLINIC | Age: 70
End: 2025-02-27
Payer: OTHER MISCELLANEOUS

## 2025-02-27 DIAGNOSIS — M54.2 CERVICAL PAIN: Primary | ICD-10-CM

## 2025-02-27 PROCEDURE — 97110 THERAPEUTIC EXERCISES: CPT

## 2025-02-27 PROCEDURE — 97140 MANUAL THERAPY 1/> REGIONS: CPT

## 2025-02-27 NOTE — PROGRESS NOTES
Physical Therapy Daily Treatment Note  [address] [place] [address]      Patient: Gonzalo Oates   : 1955  Referring practitioner: Millie Madrid MD  Date of Initial Visit: Type: THERAPY  Noted: 2025  Today's Date: 2025  Patient seen for 4 sessions         Visit Diagnoses:    ICD-10-CM ICD-9-CM   1. Cervical pain  M54.2 723.1       Subjective   Patient reports he is hurting pretty bad. Monday he was done for and had to just sit in his chair. States KORT used to ask him his pain level before treatment and after, notes that PT does not do that. States he has had over 100 sessions of PT and this is not his first rodeo. Notes that the PT is pushing him too hard.    Objective   See Exercise, Manual, and Modality Logs for complete treatment.   Observation: continues with significant forward head      Assessment/Plan  Pt without report of pain during treatment and was able to complete all exercises, performing more reps than PT requested. Educated pt that after 100 sessions of PT if his pain has not improved it may not and that PT is focusing more on his function, movement and posture which may aid with his pain. Discussed that he is in control during sessions and is not being forced to do any exercise that create pain. Also discussed that soreness and discomfort is normal when strengthening muscles, which he needs. Pt fell asleep 3X during treatment requiring being awakened to continue with exercise performance. Resistance and exercise demand decreased to aid with pt's discomfort post treatment. Pt required cuing throughout heat application for bringing his head up as he was falling asleep with the heat on. Pt without redness or skin issues following heat today. Educated pt regarding leaning back in the chair when watching TV to try to avoid his head falling forward and putting strain on his neck. Continue with current POT progressing pt per tolerance.     Timed:         Manual  Therapy: 15        mins  86640;     Therapeutic Exercise:     25    mins  02869;     Neuromuscular Gillian:      mins  52103;    Therapeutic Activity:          mins  11864;     Gait Training:           mins  95473;     Ultrasound:          mins  35846;    Ionto                                   mins   81825  Self Care                            mins   93088  Canalith Repos         mins 31495      Un-Timed:  Electrical Stimulation:        mins  55333 ( );  Dry Needling          mins self-pay  Traction          mins 33922      Timed Treatment:   40   mins   Total Treatment:     50   mins    Aggie Tong PT  [place] License: [id number]

## 2025-03-03 ENCOUNTER — TREATMENT (OUTPATIENT)
Dept: PHYSICAL THERAPY | Facility: CLINIC | Age: 70
End: 2025-03-03
Payer: OTHER MISCELLANEOUS

## 2025-03-03 DIAGNOSIS — M54.2 CERVICAL PAIN: Primary | ICD-10-CM

## 2025-03-03 PROCEDURE — 97110 THERAPEUTIC EXERCISES: CPT

## 2025-03-03 PROCEDURE — 97140 MANUAL THERAPY 1/> REGIONS: CPT
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
## 2025-03-06 ENCOUNTER — OFFICE VISIT (OUTPATIENT)
Dept: ORTHOPEDIC SURGERY | Facility: CLINIC | Age: 70
End: 2025-03-06
Payer: MEDICARE

## 2025-03-06 DIAGNOSIS — M17.0 PRIMARY OSTEOARTHRITIS OF BOTH KNEES: Primary | ICD-10-CM

## 2025-03-06 RX ORDER — METHYLPREDNISOLONE ACETATE 40 MG/ML
40 INJECTION, SUSPENSION INTRA-ARTICULAR; INTRALESIONAL; INTRAMUSCULAR; SOFT TISSUE
Status: COMPLETED | OUTPATIENT
Start: 2025-03-06 | End: 2025-03-06

## 2025-03-06 RX ORDER — ESCITALOPRAM OXALATE 10 MG/1
TABLET ORAL
COMMUNITY
Start: 2025-01-07

## 2025-03-06 RX ORDER — LIDOCAINE HYDROCHLORIDE 20 MG/ML
2 INJECTION, SOLUTION INFILTRATION; PERINEURAL
Status: COMPLETED | OUTPATIENT
Start: 2025-03-06 | End: 2025-03-06

## 2025-03-06 RX ADMIN — METHYLPREDNISOLONE ACETATE 40 MG: 40 INJECTION, SUSPENSION INTRA-ARTICULAR; INTRALESIONAL; INTRAMUSCULAR; SOFT TISSUE at 08:56

## 2025-03-06 RX ADMIN — LIDOCAINE HYDROCHLORIDE 2 ML: 20 INJECTION, SOLUTION INFILTRATION; PERINEURAL at 08:54

## 2025-03-06 RX ADMIN — LIDOCAINE HYDROCHLORIDE 2 ML: 20 INJECTION, SOLUTION INFILTRATION; PERINEURAL at 08:56

## 2025-03-06 RX ADMIN — METHYLPREDNISOLONE ACETATE 40 MG: 40 INJECTION, SUSPENSION INTRA-ARTICULAR; INTRALESIONAL; INTRAMUSCULAR; SOFT TISSUE at 08:54
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
## 2025-06-10 ENCOUNTER — OFFICE VISIT (OUTPATIENT)
Dept: ORTHOPEDIC SURGERY | Facility: CLINIC | Age: 70
End: 2025-06-10
Payer: MEDICARE

## 2025-06-10 VITALS
HEIGHT: 71 IN | WEIGHT: 241 LBS | SYSTOLIC BLOOD PRESSURE: 140 MMHG | BODY MASS INDEX: 33.74 KG/M2 | DIASTOLIC BLOOD PRESSURE: 92 MMHG

## 2025-06-10 DIAGNOSIS — M25.561 BILATERAL CHRONIC KNEE PAIN: Primary | ICD-10-CM

## 2025-06-10 DIAGNOSIS — M25.562 BILATERAL CHRONIC KNEE PAIN: Primary | ICD-10-CM

## 2025-06-10 DIAGNOSIS — M17.0 PRIMARY OSTEOARTHRITIS OF BOTH KNEES: ICD-10-CM

## 2025-06-10 DIAGNOSIS — G89.29 BILATERAL CHRONIC KNEE PAIN: Primary | ICD-10-CM

## 2025-06-10 RX ORDER — METHYLPREDNISOLONE ACETATE 40 MG/ML
40 INJECTION, SUSPENSION INTRA-ARTICULAR; INTRALESIONAL; INTRAMUSCULAR; SOFT TISSUE
Status: COMPLETED | OUTPATIENT
Start: 2025-06-10 | End: 2025-06-10

## 2025-06-10 RX ORDER — LIDOCAINE HYDROCHLORIDE 20 MG/ML
2 INJECTION, SOLUTION INFILTRATION; PERINEURAL
Status: COMPLETED | OUTPATIENT
Start: 2025-06-10 | End: 2025-06-10

## 2025-06-10 RX ORDER — METHYLPHENIDATE HYDROCHLORIDE 10 MG/1
TABLET ORAL
COMMUNITY
Start: 2025-04-21

## 2025-06-10 RX ORDER — MODAFINIL 200 MG/1
TABLET ORAL
COMMUNITY
Start: 2025-03-25 | End: 2025-06-10

## 2025-06-10 RX ADMIN — LIDOCAINE HYDROCHLORIDE 2 ML: 20 INJECTION, SOLUTION INFILTRATION; PERINEURAL at 08:42

## 2025-06-10 RX ADMIN — METHYLPREDNISOLONE ACETATE 40 MG: 40 INJECTION, SUSPENSION INTRA-ARTICULAR; INTRALESIONAL; INTRAMUSCULAR; SOFT TISSUE at 08:42
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
## 2025-06-17 ENCOUNTER — TELEPHONE (OUTPATIENT)
Dept: ORTHOPEDIC SURGERY | Facility: CLINIC | Age: 70
End: 2025-06-17
Payer: MEDICARE

## 2025-06-24 ENCOUNTER — OFFICE VISIT (OUTPATIENT)
Dept: ORTHOPEDIC SURGERY | Facility: CLINIC | Age: 70
End: 2025-06-24
Payer: MEDICARE

## 2025-06-24 VITALS
BODY MASS INDEX: 33.32 KG/M2 | SYSTOLIC BLOOD PRESSURE: 160 MMHG | HEIGHT: 71 IN | WEIGHT: 238 LBS | DIASTOLIC BLOOD PRESSURE: 92 MMHG

## 2025-06-24 DIAGNOSIS — M25.562 CHRONIC PAIN OF LEFT KNEE: Primary | ICD-10-CM

## 2025-06-24 DIAGNOSIS — G89.29 CHRONIC PAIN OF LEFT KNEE: Primary | ICD-10-CM

## 2025-06-24 DIAGNOSIS — M17.12 PRIMARY OSTEOARTHRITIS OF LEFT KNEE: ICD-10-CM

## 2025-06-24 PROCEDURE — 3077F SYST BP >= 140 MM HG: CPT | Performed by: STUDENT IN AN ORGANIZED HEALTH CARE EDUCATION/TRAINING PROGRAM

## 2025-06-24 PROCEDURE — 1160F RVW MEDS BY RX/DR IN RCRD: CPT | Performed by: STUDENT IN AN ORGANIZED HEALTH CARE EDUCATION/TRAINING PROGRAM

## 2025-06-24 PROCEDURE — 1159F MED LIST DOCD IN RCRD: CPT | Performed by: STUDENT IN AN ORGANIZED HEALTH CARE EDUCATION/TRAINING PROGRAM

## 2025-06-24 PROCEDURE — 20610 DRAIN/INJ JOINT/BURSA W/O US: CPT | Performed by: STUDENT IN AN ORGANIZED HEALTH CARE EDUCATION/TRAINING PROGRAM

## 2025-06-24 PROCEDURE — 3080F DIAST BP >= 90 MM HG: CPT | Performed by: STUDENT IN AN ORGANIZED HEALTH CARE EDUCATION/TRAINING PROGRAM

## 2025-06-24 RX ORDER — METHYLPHENIDATE HYDROCHLORIDE 20 MG/1
TABLET ORAL
COMMUNITY
Start: 2025-06-14
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
## 2025-06-25 ENCOUNTER — OFFICE VISIT (OUTPATIENT)
Dept: ORTHOPEDIC SURGERY | Facility: CLINIC | Age: 70
End: 2025-06-25
Payer: MEDICARE

## 2025-06-25 VITALS
BODY MASS INDEX: 33.32 KG/M2 | SYSTOLIC BLOOD PRESSURE: 142 MMHG | WEIGHT: 238 LBS | DIASTOLIC BLOOD PRESSURE: 90 MMHG | HEIGHT: 71 IN

## 2025-06-25 DIAGNOSIS — S92.515A CLOSED NONDISPLACED FRACTURE OF PROXIMAL PHALANX OF LESSER TOE OF LEFT FOOT, INITIAL ENCOUNTER: Primary | ICD-10-CM

## 2025-06-25 DIAGNOSIS — M25.572 ARTHRALGIA OF LEFT FOOT: ICD-10-CM
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
## 2025-06-30 ENCOUNTER — OFFICE VISIT (OUTPATIENT)
Dept: ORTHOPEDIC SURGERY | Facility: CLINIC | Age: 70
End: 2025-06-30
Payer: MEDICARE

## 2025-06-30 VITALS
DIASTOLIC BLOOD PRESSURE: 92 MMHG | HEIGHT: 71 IN | SYSTOLIC BLOOD PRESSURE: 160 MMHG | BODY MASS INDEX: 33.32 KG/M2 | WEIGHT: 238 LBS

## 2025-06-30 DIAGNOSIS — M25.562 CHRONIC PAIN OF LEFT KNEE: Primary | ICD-10-CM

## 2025-06-30 DIAGNOSIS — M17.12 PRIMARY OSTEOARTHRITIS OF LEFT KNEE: ICD-10-CM

## 2025-06-30 DIAGNOSIS — G89.29 CHRONIC PAIN OF LEFT KNEE: Primary | ICD-10-CM

## 2025-06-30 PROCEDURE — 20610 DRAIN/INJ JOINT/BURSA W/O US: CPT | Performed by: STUDENT IN AN ORGANIZED HEALTH CARE EDUCATION/TRAINING PROGRAM

## 2025-06-30 PROCEDURE — 1160F RVW MEDS BY RX/DR IN RCRD: CPT | Performed by: STUDENT IN AN ORGANIZED HEALTH CARE EDUCATION/TRAINING PROGRAM

## 2025-06-30 PROCEDURE — 3077F SYST BP >= 140 MM HG: CPT | Performed by: STUDENT IN AN ORGANIZED HEALTH CARE EDUCATION/TRAINING PROGRAM

## 2025-06-30 PROCEDURE — 3080F DIAST BP >= 90 MM HG: CPT | Performed by: STUDENT IN AN ORGANIZED HEALTH CARE EDUCATION/TRAINING PROGRAM

## 2025-06-30 PROCEDURE — 1159F MED LIST DOCD IN RCRD: CPT | Performed by: STUDENT IN AN ORGANIZED HEALTH CARE EDUCATION/TRAINING PROGRAM
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
## 2025-07-11 ENCOUNTER — LAB (OUTPATIENT)
Dept: LAB | Facility: HOSPITAL | Age: 70
End: 2025-07-11
Payer: MEDICARE

## 2025-07-11 ENCOUNTER — TRANSCRIBE ORDERS (OUTPATIENT)
Dept: LAB | Facility: HOSPITAL | Age: 70
End: 2025-07-11
Payer: MEDICARE

## 2025-07-11 ENCOUNTER — OFFICE VISIT (OUTPATIENT)
Dept: ORTHOPEDIC SURGERY | Facility: CLINIC | Age: 70
End: 2025-07-11
Payer: MEDICARE

## 2025-07-11 VITALS
BODY MASS INDEX: 33.32 KG/M2 | SYSTOLIC BLOOD PRESSURE: 114 MMHG | DIASTOLIC BLOOD PRESSURE: 84 MMHG | WEIGHT: 238 LBS | HEIGHT: 71 IN

## 2025-07-11 DIAGNOSIS — M17.12 PRIMARY OSTEOARTHRITIS OF LEFT KNEE: ICD-10-CM

## 2025-07-11 DIAGNOSIS — E11.9 DIABETES MELLITUS WITHOUT COMPLICATION: ICD-10-CM

## 2025-07-11 DIAGNOSIS — E53.8 VITAMIN B 12 DEFICIENCY: ICD-10-CM

## 2025-07-11 DIAGNOSIS — E55.9 VITAMIN D DEFICIENCY: ICD-10-CM

## 2025-07-11 DIAGNOSIS — R94.6 NONSPECIFIC ABNORMAL RESULTS OF THYROID FUNCTION STUDY: ICD-10-CM

## 2025-07-11 DIAGNOSIS — D50.0 IRON DEFICIENCY ANEMIA SECONDARY TO BLOOD LOSS (CHRONIC): ICD-10-CM

## 2025-07-11 DIAGNOSIS — I10 BENIGN HYPERTENSION: Primary | ICD-10-CM

## 2025-07-11 DIAGNOSIS — M25.562 CHRONIC PAIN OF LEFT KNEE: Primary | ICD-10-CM

## 2025-07-11 DIAGNOSIS — G89.29 CHRONIC PAIN OF LEFT KNEE: Primary | ICD-10-CM

## 2025-07-11 DIAGNOSIS — I10 BENIGN HYPERTENSION: ICD-10-CM

## 2025-07-11 LAB
ALBUMIN SERPL-MCNC: 4.1 G/DL (ref 3.5–5.2)
ALBUMIN/GLOB SERPL: 1.6 G/DL
ALP SERPL-CCNC: 76 U/L (ref 39–117)
ALT SERPL W P-5'-P-CCNC: 32 U/L (ref 1–41)
ANION GAP SERPL CALCULATED.3IONS-SCNC: 7.7 MMOL/L (ref 5–15)
AST SERPL-CCNC: 33 U/L (ref 1–40)
BILIRUB SERPL-MCNC: 0.4 MG/DL (ref 0–1.2)
BUN SERPL-MCNC: 29 MG/DL (ref 8–23)
BUN/CREAT SERPL: 18.4 (ref 7–25)
CALCIUM SPEC-SCNC: 9.6 MG/DL (ref 8.6–10.5)
CHLORIDE SERPL-SCNC: 103 MMOL/L (ref 98–107)
CO2 SERPL-SCNC: 26.3 MMOL/L (ref 22–29)
CREAT SERPL-MCNC: 1.58 MG/DL (ref 0.76–1.27)
DEPRECATED RDW RBC AUTO: 38.4 FL (ref 37–54)
EGFRCR SERPLBLD CKD-EPI 2021: 46.8 ML/MIN/1.73
ERYTHROCYTE [DISTWIDTH] IN BLOOD BY AUTOMATED COUNT: 12 % (ref 12.3–15.4)
FERRITIN SERPL-MCNC: 349 NG/ML (ref 30–400)
GLOBULIN UR ELPH-MCNC: 2.5 GM/DL
GLUCOSE SERPL-MCNC: 95 MG/DL (ref 65–99)
HBA1C MFR BLD: 5.8 % (ref 4.8–5.6)
HCT VFR BLD AUTO: 40.2 % (ref 37.5–51)
HGB BLD-MCNC: 13.6 G/DL (ref 13–17.7)
IRON 24H UR-MRATE: 95 MCG/DL (ref 59–158)
IRON SATN MFR SERPL: 25 % (ref 20–50)
MCH RBC QN AUTO: 30.2 PG (ref 26.6–33)
MCHC RBC AUTO-ENTMCNC: 33.8 G/DL (ref 31.5–35.7)
MCV RBC AUTO: 89.3 FL (ref 79–97)
PLATELET # BLD AUTO: 146 10*3/MM3 (ref 140–450)
PMV BLD AUTO: 9.9 FL (ref 6–12)
POTASSIUM SERPL-SCNC: 4.8 MMOL/L (ref 3.5–5.2)
PROT SERPL-MCNC: 6.6 G/DL (ref 6–8.5)
RBC # BLD AUTO: 4.5 10*6/MM3 (ref 4.14–5.8)
SODIUM SERPL-SCNC: 137 MMOL/L (ref 136–145)
T4 FREE SERPL-MCNC: 1.09 NG/DL (ref 0.92–1.68)
TIBC SERPL-MCNC: 383 MCG/DL (ref 298–536)
TRANSFERRIN SERPL-MCNC: 257 MG/DL (ref 200–360)
TSH SERPL DL<=0.05 MIU/L-ACNC: 1.07 UIU/ML (ref 0.27–4.2)
WBC NRBC COR # BLD AUTO: 5.82 10*3/MM3 (ref 3.4–10.8)

## 2025-07-11 PROCEDURE — 36415 COLL VENOUS BLD VENIPUNCTURE: CPT

## 2025-07-11 PROCEDURE — 84439 ASSAY OF FREE THYROXINE: CPT

## 2025-07-11 PROCEDURE — 80053 COMPREHEN METABOLIC PANEL: CPT

## 2025-07-11 PROCEDURE — 84443 ASSAY THYROID STIM HORMONE: CPT

## 2025-07-11 PROCEDURE — 85027 COMPLETE CBC AUTOMATED: CPT

## 2025-07-11 PROCEDURE — 82607 VITAMIN B-12: CPT

## 2025-07-11 PROCEDURE — 83036 HEMOGLOBIN GLYCOSYLATED A1C: CPT

## 2025-07-11 PROCEDURE — 83540 ASSAY OF IRON: CPT

## 2025-07-11 PROCEDURE — 82306 VITAMIN D 25 HYDROXY: CPT

## 2025-07-11 PROCEDURE — 84466 ASSAY OF TRANSFERRIN: CPT

## 2025-07-11 PROCEDURE — 82728 ASSAY OF FERRITIN: CPT
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
## 2025-07-12 LAB
25(OH)D3 SERPL-MCNC: 64.7 NG/ML (ref 30–100)
ALBUMIN UR-MCNC: 4 MG/DL
CREAT UR-MCNC: 136.7 MG/DL
MICROALBUMIN/CREAT UR: 29.3 MG/G (ref 0–29)
VIT B12 BLD-MCNC: 539 PG/ML (ref 211–946)

## 2025-07-17 ENCOUNTER — OFFICE VISIT (OUTPATIENT)
Dept: ORTHOPEDIC SURGERY | Facility: CLINIC | Age: 70
End: 2025-07-17
Payer: MEDICARE

## 2025-07-17 VITALS
BODY MASS INDEX: 33.32 KG/M2 | HEIGHT: 71 IN | HEART RATE: 57 BPM | WEIGHT: 238 LBS | DIASTOLIC BLOOD PRESSURE: 91 MMHG | SYSTOLIC BLOOD PRESSURE: 141 MMHG

## 2025-07-17 DIAGNOSIS — S92.515A CLOSED NONDISPLACED FRACTURE OF PROXIMAL PHALANX OF LESSER TOE OF LEFT FOOT, INITIAL ENCOUNTER: Primary | ICD-10-CM
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
## 2025-07-18 ENCOUNTER — OFFICE VISIT (OUTPATIENT)
Dept: ORTHOPEDIC SURGERY | Facility: CLINIC | Age: 70
End: 2025-07-18
Payer: MEDICARE

## 2025-07-18 VITALS
DIASTOLIC BLOOD PRESSURE: 76 MMHG | WEIGHT: 238 LBS | SYSTOLIC BLOOD PRESSURE: 132 MMHG | HEIGHT: 71 IN | BODY MASS INDEX: 33.32 KG/M2

## 2025-07-18 DIAGNOSIS — M25.562 CHRONIC PAIN OF LEFT KNEE: Primary | ICD-10-CM

## 2025-07-18 DIAGNOSIS — M17.12 PRIMARY OSTEOARTHRITIS OF LEFT KNEE: ICD-10-CM

## 2025-07-18 DIAGNOSIS — G89.29 CHRONIC PAIN OF LEFT KNEE: Primary | ICD-10-CM

## 2025-07-18 RX ORDER — MIRTAZAPINE 15 MG/1
TABLET, FILM COATED ORAL
COMMUNITY
Start: 2025-07-11 | End: 2025-07-18

## 2025-07-18 RX ORDER — DESVENLAFAXINE 50 MG/1
TABLET, FILM COATED, EXTENDED RELEASE ORAL
COMMUNITY
Start: 2025-07-06 | End: 2025-07-18
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
## 2025-07-25 ENCOUNTER — OFFICE VISIT (OUTPATIENT)
Dept: ORTHOPEDIC SURGERY | Facility: CLINIC | Age: 70
End: 2025-07-25
Payer: MEDICARE

## 2025-07-25 VITALS
DIASTOLIC BLOOD PRESSURE: 84 MMHG | SYSTOLIC BLOOD PRESSURE: 130 MMHG | WEIGHT: 234 LBS | BODY MASS INDEX: 32.76 KG/M2 | HEIGHT: 71 IN

## 2025-07-25 DIAGNOSIS — M25.562 CHRONIC PAIN OF LEFT KNEE: Primary | ICD-10-CM

## 2025-07-25 DIAGNOSIS — M17.12 PRIMARY OSTEOARTHRITIS OF LEFT KNEE: ICD-10-CM

## 2025-07-25 DIAGNOSIS — G89.29 CHRONIC PAIN OF LEFT KNEE: Primary | ICD-10-CM
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
## 2025-08-19 ENCOUNTER — TELEPHONE (OUTPATIENT)
Dept: ORTHOPEDIC SURGERY | Facility: CLINIC | Age: 70
End: 2025-08-19
Payer: MEDICARE

## 2025-08-25 ENCOUNTER — APPOINTMENT (OUTPATIENT)
Facility: HOSPITAL | Age: 70
End: 2025-08-25
Payer: MEDICARE

## 2025-08-25 ENCOUNTER — HOSPITAL ENCOUNTER (EMERGENCY)
Facility: HOSPITAL | Age: 70
Discharge: HOME | End: 2025-08-26
Attending: STUDENT IN AN ORGANIZED HEALTH CARE EDUCATION/TRAINING PROGRAM | Admitting: STUDENT IN AN ORGANIZED HEALTH CARE EDUCATION/TRAINING PROGRAM
Payer: MEDICARE

## 2025-08-25 DIAGNOSIS — S12.9XXA CLOSED FRACTURE OF SPINOUS PROCESS OF CERVICAL VERTEBRA, INITIAL ENCOUNTER: Primary | ICD-10-CM

## 2025-08-25 PROCEDURE — 72131 CT LUMBAR SPINE W/O DYE: CPT

## 2025-08-25 PROCEDURE — 74177 CT ABD & PELVIS W/CONTRAST: CPT

## 2025-08-25 PROCEDURE — 73560 X-RAY EXAM OF KNEE 1 OR 2: CPT

## 2025-08-25 PROCEDURE — 99285 EMERGENCY DEPT VISIT HI MDM: CPT | Performed by: STUDENT IN AN ORGANIZED HEALTH CARE EDUCATION/TRAINING PROGRAM

## 2025-08-25 PROCEDURE — 72128 CT CHEST SPINE W/O DYE: CPT

## 2025-08-25 PROCEDURE — 25510000001 IOPAMIDOL 61 % SOLUTION: Performed by: STUDENT IN AN ORGANIZED HEALTH CARE EDUCATION/TRAINING PROGRAM

## 2025-08-25 PROCEDURE — 70450 CT HEAD/BRAIN W/O DYE: CPT

## 2025-08-25 PROCEDURE — 72125 CT NECK SPINE W/O DYE: CPT

## 2025-08-25 PROCEDURE — 71260 CT THORAX DX C+: CPT

## 2025-08-25 RX ORDER — IOPAMIDOL 612 MG/ML
100 INJECTION, SOLUTION INTRAVASCULAR
Status: COMPLETED | OUTPATIENT
Start: 2025-08-25 | End: 2025-08-25

## 2025-08-25 RX ADMIN — IOPAMIDOL 85 ML: 612 INJECTION, SOLUTION INTRAVENOUS at 23:18

## 2025-08-26 VITALS
HEART RATE: 50 BPM | BODY MASS INDEX: 33.32 KG/M2 | RESPIRATION RATE: 20 BRPM | SYSTOLIC BLOOD PRESSURE: 124 MMHG | OXYGEN SATURATION: 97 % | HEIGHT: 71 IN | WEIGHT: 238 LBS | DIASTOLIC BLOOD PRESSURE: 80 MMHG | TEMPERATURE: 99.7 F

## (undated) DEVICE — PK KN ARTHSCP 20

## (undated) DEVICE — GLV SURG TRIUMPH PF LTX 8 STRL

## (undated) DEVICE — DRSNG WND GZ CURAD OIL EMULSION 3X3IN STRL

## (undated) DEVICE — BNDG ELAS ELITE V/CLOSE 4IN 5YD LF STRL

## (undated) DEVICE — APPL CHLORAPREP W/TINT 10.5ML ORNG BX25

## (undated) DEVICE — SPNG GZ WOVN 4X4IN 12PLY 10/BX STRL

## (undated) DEVICE — NDL SPINE 20G 3 1/2 YEL STRL 1P/U

## (undated) DEVICE — GLV SURG SENSICARE SLT PF LF 7.5 STRL

## (undated) DEVICE — PAD,ABDOMINAL,5"X9",STERILE,LF,1/PK: Brand: MEDLINE INDUSTRIES, INC.

## (undated) DEVICE — POSTN SURG LEG WELL LOW EXTREM 1P/U

## (undated) DEVICE — SUT ETHLN 3-0 FS118IN 663H

## (undated) DEVICE — EMERALD ARTHROSCOPY SHEET: Brand: CONVERTORS

## (undated) DEVICE — DYONICS 25 PATIENT TUBE SET MUST                                    BE USED WITH 7211007, 12 PER BOX

## (undated) DEVICE — 3.5 MM FULL RADIUS STRAIGHT                                    BLADES, POWER/EP-1, BEIGE, PACKAGED                                    6 PER BOX, STERILE